# Patient Record
Sex: MALE | Race: WHITE | NOT HISPANIC OR LATINO | Employment: FULL TIME | URBAN - NONMETROPOLITAN AREA
[De-identification: names, ages, dates, MRNs, and addresses within clinical notes are randomized per-mention and may not be internally consistent; named-entity substitution may affect disease eponyms.]

---

## 2020-02-11 ENCOUNTER — OFFICE VISIT (OUTPATIENT)
Dept: INTERNAL MEDICINE | Facility: CLINIC | Age: 35
End: 2020-02-11

## 2020-02-11 VITALS
SYSTOLIC BLOOD PRESSURE: 131 MMHG | HEART RATE: 77 BPM | TEMPERATURE: 98.1 F | HEIGHT: 69 IN | DIASTOLIC BLOOD PRESSURE: 69 MMHG | BODY MASS INDEX: 30.21 KG/M2 | WEIGHT: 204 LBS | OXYGEN SATURATION: 98 %

## 2020-02-11 DIAGNOSIS — M79.671 RIGHT FOOT PAIN: ICD-10-CM

## 2020-02-11 DIAGNOSIS — F41.9 ANXIETY: ICD-10-CM

## 2020-02-11 DIAGNOSIS — R12 HEARTBURN: ICD-10-CM

## 2020-02-11 DIAGNOSIS — G43.809 OTHER MIGRAINE WITHOUT STATUS MIGRAINOSUS, NOT INTRACTABLE: ICD-10-CM

## 2020-02-11 DIAGNOSIS — F31.9 BIPOLAR 1 DISORDER (HCC): Primary | ICD-10-CM

## 2020-02-11 DIAGNOSIS — R45.4 DIFFICULTY CONTROLLING ANGER: ICD-10-CM

## 2020-02-11 PROCEDURE — 99204 OFFICE O/P NEW MOD 45 MIN: CPT | Performed by: INTERNAL MEDICINE

## 2020-02-11 RX ORDER — IBUPROFEN 800 MG/1
800 TABLET ORAL 2 TIMES DAILY
COMMUNITY
Start: 2020-01-15 | End: 2020-02-11 | Stop reason: SDUPTHER

## 2020-02-11 RX ORDER — IBUPROFEN 800 MG/1
800 TABLET ORAL 2 TIMES DAILY
Qty: 60 TABLET | Refills: 3 | Status: SHIPPED | OUTPATIENT
Start: 2020-02-11 | End: 2020-04-02 | Stop reason: SDUPTHER

## 2020-02-11 RX ORDER — HALOPERIDOL 2 MG/1
2 TABLET ORAL 2 TIMES DAILY
Qty: 60 TABLET | Refills: 2 | Status: SHIPPED | OUTPATIENT
Start: 2020-02-11 | End: 2020-04-02

## 2020-02-11 RX ORDER — ALPRAZOLAM 1 MG/1
1 TABLET ORAL 3 TIMES DAILY PRN
Qty: 90 TABLET | Refills: 0 | Status: SHIPPED | OUTPATIENT
Start: 2020-02-11 | End: 2020-03-10 | Stop reason: SDUPTHER

## 2020-02-11 RX ORDER — RANITIDINE 300 MG/1
300 TABLET ORAL 2 TIMES DAILY
COMMUNITY
End: 2020-02-11 | Stop reason: SDUPTHER

## 2020-02-11 RX ORDER — LAMOTRIGINE 200 MG/1
200 TABLET ORAL 2 TIMES DAILY
COMMUNITY
End: 2020-02-11 | Stop reason: SDUPTHER

## 2020-02-11 RX ORDER — HALOPERIDOL 2 MG/1
2 TABLET ORAL 2 TIMES DAILY
COMMUNITY
End: 2020-02-11 | Stop reason: SDUPTHER

## 2020-02-11 RX ORDER — FAMOTIDINE 40 MG/1
40 TABLET, FILM COATED ORAL 2 TIMES DAILY
Qty: 60 TABLET | Refills: 3 | Status: SHIPPED | OUTPATIENT
Start: 2020-02-11 | End: 2020-04-02 | Stop reason: SDUPTHER

## 2020-02-11 RX ORDER — PANTOPRAZOLE SODIUM 40 MG/1
40 TABLET, DELAYED RELEASE ORAL DAILY
Qty: 30 TABLET | Refills: 2 | Status: SHIPPED | OUTPATIENT
Start: 2020-02-11 | End: 2020-04-02

## 2020-02-11 RX ORDER — LAMOTRIGINE 200 MG/1
200 TABLET ORAL 2 TIMES DAILY
Qty: 60 TABLET | Refills: 2 | Status: SHIPPED | OUTPATIENT
Start: 2020-02-11 | End: 2020-04-02 | Stop reason: SDUPTHER

## 2020-02-11 RX ORDER — QUETIAPINE FUMARATE 200 MG/1
200 TABLET, FILM COATED ORAL DAILY
COMMUNITY
End: 2020-02-11 | Stop reason: SDUPTHER

## 2020-02-11 RX ORDER — SUMATRIPTAN 100 MG/1
100 TABLET, FILM COATED ORAL AS NEEDED
Qty: 30 TABLET | Refills: 2 | Status: SHIPPED | OUTPATIENT
Start: 2020-02-11

## 2020-02-11 RX ORDER — SUMATRIPTAN 100 MG/1
100 TABLET, FILM COATED ORAL AS NEEDED
COMMUNITY
End: 2020-02-11 | Stop reason: SDUPTHER

## 2020-02-11 RX ORDER — ALPRAZOLAM 1 MG/1
1 TABLET ORAL 3 TIMES DAILY
COMMUNITY
Start: 2020-01-09 | End: 2020-02-11 | Stop reason: SDUPTHER

## 2020-02-11 RX ORDER — QUETIAPINE FUMARATE 200 MG/1
200 TABLET, FILM COATED ORAL DAILY
Qty: 30 TABLET | Refills: 2 | Status: SHIPPED | OUTPATIENT
Start: 2020-02-11 | End: 2020-04-02 | Stop reason: SDUPTHER

## 2020-02-11 NOTE — PROGRESS NOTES
Subjective     Chief Complaint   Patient presents with   • Establish Care       History of Present Illness  Patient works at [employer]. States that he got fired at [employer] because he can't deal with people and bosses.  Patient is going to counseling at [facility]. States that he refuses to go to their doctors because they change out so much. Discussed past trauma and anxiety issues.   Patient states that if he doesn't take the Haldol feels like there is another part of him that wants to come out and be angry. Patient states that he does not feel too sleep.   Patient recently has had decreasing doses of Seroquel because morning fatigue. Meds were being adjusted by Modesto Mascorro MD.   Patient has been to [facility] within the last year.     Patient's PMR from outside medical facility reviewed and noted.    Review of Systems   Constitutional: Negative for chills and fever.   HENT: Positive for mouth sores (cold sore) and sore throat.    Eyes: Negative for discharge and redness.   Respiratory: Positive for shortness of breath (Happens with anger issues).    Gastrointestinal: Positive for nausea (Patient states from acid refulx). Negative for diarrhea and vomiting.   Genitourinary: Negative for dysuria and hematuria.   Musculoskeletal:        Foot pain   Skin: Negative for color change and wound.   Neurological: Negative for seizures and weakness.   Psychiatric/Behavioral: Positive for behavioral problems and sleep disturbance. Negative for self-injury and suicidal ideas. The patient is nervous/anxious.         Otherwise complete ROS reviewed and negative except as mentioned in the HPI.    Past Medical History:   Past Medical History:   Diagnosis Date   • Anger    • Anxiety    • Bipolar 1 disorder (CMS/HCC)    • Broken foot     right Heel   • Headache      Past Surgical History:History reviewed. No pertinent surgical history.     Social History:  reports that he has been smoking. He has been smoking about 1.00  "pack per day. He has quit using smokeless tobacco.  His smokeless tobacco use included snuff. Drug use questions deferred to the physician. He reports that he does not drink alcohol.    Family History: Family history is unknown by patient.       Allergies:  Allergies   Allergen Reactions   • Tetracycline Rash     Had when he was child       Medications:  Prior to Admission medications    Medication Sig Start Date End Date Taking? Authorizing Provider   ALPRAZolam (XANAX) 1 MG tablet Take 1 mg by mouth 3 (Three) Times a Day. 1/9/20  Yes Dwayne Jeff MD   haloperidol (HALDOL) 2 MG tablet Take 2 mg by mouth 2 (Two) Times a Day.   Yes Dwayne Jeff MD   ibuprofen (ADVIL,MOTRIN) 800 MG tablet Take 800 mg by mouth 2 (Two) Times a Day. 1/15/20  Yes Dwayne Jeff MD   lamoTRIgine (LaMICtal) 200 MG tablet Take 200 mg by mouth 2 (Two) Times a Day.   Yes Dwayne Jeff MD   QUEtiapine (SEROquel) 200 MG tablet Take 200 mg by mouth Daily.   Yes Dwayne Jeff MD   raNITIdine (ZANTAC) 300 MG tablet Take 300 mg by mouth 2 (Two) Times a Day.   Yes Dwayne Jeff MD   SUMAtriptan (IMITREX) 100 MG tablet Take 100 mg by mouth As Needed.   Yes Dwayne Jeff MD       Objective     Vital Signs: /69 (BP Location: Left arm, Patient Position: Sitting, Cuff Size: Adult)   Pulse 77   Temp 98.1 °F (36.7 °C) (Temporal)   Ht 175.3 cm (69\")   Wt 92.5 kg (204 lb)   SpO2 98%   BMI 30.13 kg/m²   Physical Exam   Constitutional: He appears well-developed and well-nourished.   HENT:   Head: Normocephalic and atraumatic.   Nose: Nose normal.   Mouth/Throat: Oropharynx is clear and moist.   Eyes: Conjunctivae and EOM are normal.   Neck: Normal range of motion. Neck supple.   Cardiovascular: Normal rate, regular rhythm and normal heart sounds.   Pulmonary/Chest: Effort normal and breath sounds normal.   Abdominal: Soft. Bowel sounds are normal.   Musculoskeletal: He exhibits no edema or " tenderness.   Neurological: He is alert. No cranial nerve deficit.   Skin: Skin is warm and dry.   Psychiatric: He has a normal mood and affect. Thought content normal.   Vitals reviewed.      Patient's Body mass index is 30.13 kg/m². BMI is within normal parameters. No follow-up required..      Results Reviewed:  No results found for: GLUCOSE, BUN, CREATININE, NA, K, CL, CO2, CALCIUM, ALT, AST, WBC, HCT, PLT, CHOL, TRIG, HDL, LDL, LDLHDL, HGBA1C      Assessment / Plan     Assessment/Plan:  1. Bipolar 1 disorder (CMS/HCC)  - lamoTRIgine (LaMICtal) 200 MG tablet; Take 1 tablet by mouth 2 (Two) Times a Day.  Dispense: 60 tablet; Refill: 2  - QUEtiapine (SEROquel) 200 MG tablet; Take 1 tablet by mouth Daily.  Dispense: 30 tablet; Refill: 2  - CBC w AUTO Differential; Future  - Comprehensive metabolic panel; Future    2. Anxiety  - ALPRAZolam (XANAX) 1 MG tablet; Take 1 tablet by mouth 3 (Three) Times a Day As Needed for Anxiety.  Dispense: 90 tablet; Refill: 0    3. Other migraine without status migrainosus, not intractable  - SUMAtriptan (IMITREX) 100 MG tablet; Take 1 tablet by mouth As Needed for Migraine.  Dispense: 30 tablet; Refill: 2    4. Heartburn  - famotidine (PEPCID) 40 MG tablet; Take 1 tablet by mouth 2 (Two) Times a Day.  Dispense: 60 tablet; Refill: 3  - pantoprazole (PROTONIX) 40 MG EC tablet; Take 1 tablet by mouth Daily.  Dispense: 30 tablet; Refill: 2    5. Difficulty controlling anger  - haloperidol (HALDOL) 2 MG tablet; Take 1 tablet by mouth 2 (Two) Times a Day.  Dispense: 60 tablet; Refill: 2    6. Right foot pain  - ibuprofen (ADVIL,MOTRIN) 800 MG tablet; Take 1 tablet by mouth 2 (Two) Times a Day.  Dispense: 60 tablet; Refill: 3        Return in about 4 weeks (around 3/10/2020) for Next scheduled follow up. unless patient needs to be seen sooner or acute issues arise.    Code Status: Full    I have discussed the patient results/orders and and plan/recommendation with them at today's visit.       Radha Sanford, DO   02/11/2020

## 2020-03-09 ENCOUNTER — TELEPHONE (OUTPATIENT)
Dept: INTERNAL MEDICINE | Facility: CLINIC | Age: 35
End: 2020-03-09

## 2020-03-09 NOTE — TELEPHONE ENCOUNTER
Just fernando,  Pt wants to talk to you about stopping the Haldol and Seroquil,  He feels like he doesn't need them, and [facility] just put him [facility] them.   He will discuss at apt tomorrow.

## 2020-03-10 ENCOUNTER — OFFICE VISIT (OUTPATIENT)
Dept: INTERNAL MEDICINE | Facility: CLINIC | Age: 35
End: 2020-03-10

## 2020-03-10 VITALS
BODY MASS INDEX: 29.77 KG/M2 | OXYGEN SATURATION: 98 % | DIASTOLIC BLOOD PRESSURE: 72 MMHG | HEART RATE: 82 BPM | HEIGHT: 69 IN | WEIGHT: 201 LBS | TEMPERATURE: 99.5 F | SYSTOLIC BLOOD PRESSURE: 108 MMHG

## 2020-03-10 DIAGNOSIS — F41.9 ANXIETY: ICD-10-CM

## 2020-03-10 DIAGNOSIS — F31.9 BIPOLAR 1 DISORDER (HCC): ICD-10-CM

## 2020-03-10 DIAGNOSIS — N52.9 ERECTILE DYSFUNCTION, UNSPECIFIED ERECTILE DYSFUNCTION TYPE: Primary | ICD-10-CM

## 2020-03-10 PROCEDURE — 99213 OFFICE O/P EST LOW 20 MIN: CPT | Performed by: INTERNAL MEDICINE

## 2020-03-10 RX ORDER — ALPRAZOLAM 1 MG/1
1 TABLET ORAL 3 TIMES DAILY PRN
Qty: 90 TABLET | Refills: 0 | Status: SHIPPED | OUTPATIENT
Start: 2020-03-10 | End: 2020-04-02 | Stop reason: SDUPTHER

## 2020-03-10 NOTE — PROGRESS NOTES
"        Subjective     Chief Complaint   Patient presents with   • Follow-up     4 wk, discuss medications       History of Present Illness  Patient states that when he was in [employer] he used to get into fights. That's why he was put on Haldol. States that it is over two months since he could get an erection. Is able to get an erection, but states that in the middle of sex \"it just dies out.\"  States that he has been on Haldol for about a year.    Patient states that he tried the other day but it just didn't work.      Having difficulty with his relationship.    Patient states that his mind is mush. Difficulty trying to train for a new position at work.     Patient's PMR from outside medical facility reviewed and noted.    Review of Systems   Constitutional: Negative for chills and fever.   Eyes: Negative for discharge and redness.   Gastrointestinal: Negative for diarrhea, nausea and vomiting.   Genitourinary: Negative for dysuria and urgency.        Erectile dysfunction   Musculoskeletal: Negative for arthralgias and back pain.   Skin: Negative for color change and wound.   Psychiatric/Behavioral: Positive for agitation and behavioral problems.        Otherwise complete ROS reviewed and negative except as mentioned in the HPI.    Past Medical History:   Past Medical History:   Diagnosis Date   • Anger    • Anxiety    • Bipolar 1 disorder (CMS/HCC)    • Broken foot     right Heel   • Headache      Past Surgical History:History reviewed. No pertinent surgical history.     Social History:  reports that he has been smoking electronic cigarette. He has been smoking about 1.00 pack per day. He has quit using smokeless tobacco.  His smokeless tobacco use included snuff. Drug use questions deferred to the physician. He reports that he does not drink alcohol.    Family History: Family history is unknown by patient.       Allergies:  Allergies   Allergen Reactions   • Tetracycline Rash     Had when he was child   " "    Medications:  Prior to Admission medications    Medication Sig Start Date End Date Taking? Authorizing Provider   ALPRAZolam (XANAX) 1 MG tablet Take 1 tablet by mouth 3 (Three) Times a Day As Needed for Anxiety. 2/11/20  Yes Radha Sanford DO   famotidine (PEPCID) 40 MG tablet Take 1 tablet by mouth 2 (Two) Times a Day. 2/11/20  Yes Radha Sanford DO   haloperidol (HALDOL) 2 MG tablet Take 1 tablet by mouth 2 (Two) Times a Day. 2/11/20  Yes Radha Sanford DO   ibuprofen (ADVIL,MOTRIN) 800 MG tablet Take 1 tablet by mouth 2 (Two) Times a Day. 2/11/20  Yes Radha Sanford DO   lamoTRIgine (LaMICtal) 200 MG tablet Take 1 tablet by mouth 2 (Two) Times a Day. 2/11/20  Yes Radha Sanford DO   pantoprazole (PROTONIX) 40 MG EC tablet Take 1 tablet by mouth Daily. 2/11/20  Yes Radha Sanford DO   QUEtiapine (SEROquel) 200 MG tablet Take 1 tablet by mouth Daily. 2/11/20  Yes Radha Sanford DO   SUMAtriptan (IMITREX) 100 MG tablet Take 1 tablet by mouth As Needed for Migraine. 2/11/20  Yes Radha Sanford DO       Objective     Vital Signs: /72 (BP Location: Left arm, Patient Position: Sitting, Cuff Size: Adult)   Pulse 82   Temp 99.5 °F (37.5 °C) (Temporal)   Ht 175.3 cm (69\")   Wt 91.2 kg (201 lb)   SpO2 98%   BMI 29.68 kg/m²   Physical Exam   Constitutional: He appears well-developed and well-nourished.   HENT:   Head: Normocephalic and atraumatic.   Nose: Nose normal.   Mouth/Throat: Oropharynx is clear and moist.   Eyes: Conjunctivae and EOM are normal.   Neck: Normal range of motion. Neck supple.   Cardiovascular: Normal rate, regular rhythm and normal heart sounds.   Pulmonary/Chest: Effort normal and breath sounds normal.   Abdominal: Soft. Bowel sounds are normal.   Musculoskeletal: He exhibits no edema or tenderness.   Neurological: He is alert. No cranial nerve deficit.   Skin: Skin is warm and dry.   Psychiatric: He has a normal mood and affect.   Vitals " reviewed.      Patient's Body mass index is 29.68 kg/m². BMI is within normal parameters. No follow-up required..      Results Reviewed:  No results found for: GLUCOSE, BUN, CREATININE, NA, K, CL, CO2, CALCIUM, ALT, AST, WBC, HCT, PLT, CHOL, TRIG, HDL, LDL, LDLHDL, HGBA1C      Assessment / Plan     Assessment/Plan:  1. Erectile dysfunction, unspecified erectile dysfunction type  - TSH  - Testosterone    2. Bipolar 1 disorder (CMS/HCC)  - CBC w AUTO Differential  - Comprehensive metabolic panel    Patient is going to try and wean from his Haldol. Patient is going to take one pill a week for a week and then one pill every other day for a week until the medication is DCd. Patient is to watch for mood swings and increase in anger. Patient is to call if anger issues arise.     Return in about 4 weeks (around 4/7/2020) for Next scheduled follow up. unless patient needs to be seen sooner or acute issues arise.    Code Status: Full    I have discussed the patient results/orders and and plan/recommendation with them at today's visit.      Radha Sanford, DO   03/10/2020

## 2020-03-12 LAB
ALBUMIN SERPL-MCNC: 4.9 G/DL (ref 4–5)
ALBUMIN/GLOB SERPL: 2.2 {RATIO} (ref 1.2–2.2)
ALP SERPL-CCNC: 102 IU/L (ref 39–117)
ALT SERPL-CCNC: 25 IU/L (ref 0–44)
AST SERPL-CCNC: 20 IU/L (ref 0–40)
BASOPHILS # BLD AUTO: 0.1 X10E3/UL (ref 0–0.2)
BASOPHILS NFR BLD AUTO: 1 %
BILIRUB SERPL-MCNC: 0.5 MG/DL (ref 0–1.2)
BUN SERPL-MCNC: 16 MG/DL (ref 6–20)
BUN/CREAT SERPL: 14 (ref 9–20)
CALCIUM SERPL-MCNC: 9.9 MG/DL (ref 8.7–10.2)
CHLORIDE SERPL-SCNC: 102 MMOL/L (ref 96–106)
CO2 SERPL-SCNC: 24 MMOL/L (ref 20–29)
CREAT SERPL-MCNC: 1.18 MG/DL (ref 0.76–1.27)
EOSINOPHIL # BLD AUTO: 0.2 X10E3/UL (ref 0–0.4)
EOSINOPHIL NFR BLD AUTO: 2 %
ERYTHROCYTE [DISTWIDTH] IN BLOOD BY AUTOMATED COUNT: 12.1 % (ref 11.6–15.4)
GLOBULIN SER CALC-MCNC: 2.2 G/DL (ref 1.5–4.5)
GLUCOSE SERPL-MCNC: 96 MG/DL (ref 65–99)
HCT VFR BLD AUTO: 45.2 % (ref 37.5–51)
HGB BLD-MCNC: 15 G/DL (ref 13–17.7)
IMM GRANULOCYTES # BLD AUTO: 0 X10E3/UL (ref 0–0.1)
IMM GRANULOCYTES NFR BLD AUTO: 0 %
LYMPHOCYTES # BLD AUTO: 1.8 X10E3/UL (ref 0.7–3.1)
LYMPHOCYTES NFR BLD AUTO: 22 %
MCH RBC QN AUTO: 31.6 PG (ref 26.6–33)
MCHC RBC AUTO-ENTMCNC: 33.2 G/DL (ref 31.5–35.7)
MCV RBC AUTO: 95 FL (ref 79–97)
MONOCYTES # BLD AUTO: 0.5 X10E3/UL (ref 0.1–0.9)
MONOCYTES NFR BLD AUTO: 7 %
NEUTROPHILS # BLD AUTO: 5.6 X10E3/UL (ref 1.4–7)
NEUTROPHILS NFR BLD AUTO: 68 %
PLATELET # BLD AUTO: 350 X10E3/UL (ref 150–450)
POTASSIUM SERPL-SCNC: 4.8 MMOL/L (ref 3.5–5.2)
PROT SERPL-MCNC: 7.1 G/DL (ref 6–8.5)
RBC # BLD AUTO: 4.75 X10E6/UL (ref 4.14–5.8)
SODIUM SERPL-SCNC: 142 MMOL/L (ref 134–144)
TESTOST SERPL-MCNC: 310 NG/DL (ref 264–916)
TSH SERPL DL<=0.005 MIU/L-ACNC: 1.69 UIU/ML (ref 0.45–4.5)
WBC # BLD AUTO: 8.1 X10E3/UL (ref 3.4–10.8)

## 2020-03-14 ENCOUNTER — HOSPITAL ENCOUNTER (INPATIENT)
Age: 35
LOS: 3 days | Discharge: HOME | DRG: 885 | End: 2020-03-17
Attending: EMERGENCY MEDICINE | Admitting: PSYCHIATRY & NEUROLOGY
Payer: MEDICAID

## 2020-03-14 LAB
ALBUMIN SERPL-MCNC: 4.6 G/DL (ref 3.5–5.2)
ALP BLD-CCNC: 84 U/L (ref 40–130)
ALT SERPL-CCNC: 19 U/L (ref 5–41)
AMPHETAMINE SCREEN, URINE: NEGATIVE
ANION GAP SERPL CALCULATED.3IONS-SCNC: 9 MMOL/L (ref 7–19)
AST SERPL-CCNC: 15 U/L (ref 5–40)
BARBITURATE SCREEN URINE: NEGATIVE
BASOPHILS ABSOLUTE: 0.1 K/UL (ref 0–0.2)
BASOPHILS RELATIVE PERCENT: 0.6 % (ref 0–1)
BENZODIAZEPINE SCREEN, URINE: POSITIVE
BILIRUB SERPL-MCNC: 0.3 MG/DL (ref 0.2–1.2)
BILIRUBIN URINE: NEGATIVE
BLOOD, URINE: NEGATIVE
BUN BLDV-MCNC: 14 MG/DL (ref 6–20)
CALCIUM SERPL-MCNC: 9.2 MG/DL (ref 8.6–10)
CANNABINOID SCREEN URINE: POSITIVE
CHLORIDE BLD-SCNC: 106 MMOL/L (ref 98–111)
CLARITY: CLEAR
CO2: 27 MMOL/L (ref 22–29)
COCAINE METABOLITE SCREEN URINE: NEGATIVE
COLOR: YELLOW
CREAT SERPL-MCNC: 1.1 MG/DL (ref 0.5–1.2)
EOSINOPHILS ABSOLUTE: 0.2 K/UL (ref 0–0.6)
EOSINOPHILS RELATIVE PERCENT: 1.8 % (ref 0–5)
ETHANOL: <10 MG/DL (ref 0–0.08)
GFR NON-AFRICAN AMERICAN: >60
GLUCOSE BLD-MCNC: 99 MG/DL (ref 74–109)
GLUCOSE URINE: NEGATIVE MG/DL
HCT VFR BLD CALC: 40.9 % (ref 42–52)
HEMOGLOBIN: 14 G/DL (ref 14–18)
IMMATURE GRANULOCYTES #: 0 K/UL
KETONES, URINE: NEGATIVE MG/DL
LEUKOCYTE ESTERASE, URINE: NEGATIVE
LYMPHOCYTES ABSOLUTE: 1.8 K/UL (ref 1.1–4.5)
LYMPHOCYTES RELATIVE PERCENT: 20.8 % (ref 20–40)
Lab: ABNORMAL
MCH RBC QN AUTO: 31.5 PG (ref 27–31)
MCHC RBC AUTO-ENTMCNC: 34.2 G/DL (ref 33–37)
MCV RBC AUTO: 91.9 FL (ref 80–94)
MONOCYTES ABSOLUTE: 0.5 K/UL (ref 0–0.9)
MONOCYTES RELATIVE PERCENT: 5.6 % (ref 0–10)
NEUTROPHILS ABSOLUTE: 6 K/UL (ref 1.5–7.5)
NEUTROPHILS RELATIVE PERCENT: 70.8 % (ref 50–65)
NITRITE, URINE: NEGATIVE
OPIATE SCREEN URINE: POSITIVE
PDW BLD-RTO: 11.8 % (ref 11.5–14.5)
PH UA: 6 (ref 5–8)
PLATELET # BLD: 299 K/UL (ref 130–400)
PMV BLD AUTO: 8.9 FL (ref 9.4–12.4)
POTASSIUM REFLEX MAGNESIUM: 4.3 MMOL/L (ref 3.5–5)
PROTEIN UA: NEGATIVE MG/DL
RBC # BLD: 4.45 M/UL (ref 4.7–6.1)
SODIUM BLD-SCNC: 142 MMOL/L (ref 136–145)
SPECIFIC GRAVITY UA: 1.03 (ref 1–1.03)
TOTAL PROTEIN: 6.6 G/DL (ref 6.6–8.7)
URINE REFLEX TO CULTURE: NORMAL
UROBILINOGEN, URINE: 0.2 E.U./DL
WBC # BLD: 8.5 K/UL (ref 4.8–10.8)

## 2020-03-14 PROCEDURE — 80307 DRUG TEST PRSMV CHEM ANLYZR: CPT

## 2020-03-14 PROCEDURE — 80061 LIPID PANEL: CPT

## 2020-03-14 PROCEDURE — 1240000000 HC EMOTIONAL WELLNESS R&B

## 2020-03-14 PROCEDURE — 85025 COMPLETE CBC W/AUTO DIFF WBC: CPT

## 2020-03-14 PROCEDURE — 36415 COLL VENOUS BLD VENIPUNCTURE: CPT

## 2020-03-14 PROCEDURE — G0480 DRUG TEST DEF 1-7 CLASSES: HCPCS

## 2020-03-14 PROCEDURE — 81003 URINALYSIS AUTO W/O SCOPE: CPT

## 2020-03-14 PROCEDURE — 83036 HEMOGLOBIN GLYCOSYLATED A1C: CPT

## 2020-03-14 PROCEDURE — 84443 ASSAY THYROID STIM HORMONE: CPT

## 2020-03-14 PROCEDURE — 80053 COMPREHEN METABOLIC PANEL: CPT

## 2020-03-14 PROCEDURE — 82306 VITAMIN D 25 HYDROXY: CPT

## 2020-03-14 PROCEDURE — 99285 EMERGENCY DEPT VISIT HI MDM: CPT

## 2020-03-14 PROCEDURE — 6370000000 HC RX 637 (ALT 250 FOR IP): Performed by: PSYCHIATRY & NEUROLOGY

## 2020-03-14 PROCEDURE — 82607 VITAMIN B-12: CPT

## 2020-03-14 RX ORDER — TRAZODONE HYDROCHLORIDE 50 MG/1
50 TABLET ORAL NIGHTLY PRN
Status: DISCONTINUED | OUTPATIENT
Start: 2020-03-14 | End: 2020-03-16

## 2020-03-14 RX ORDER — PANTOPRAZOLE SODIUM 40 MG/1
40 TABLET, DELAYED RELEASE ORAL DAILY
COMMUNITY
End: 2020-06-23

## 2020-03-14 RX ORDER — ESOMEPRAZOLE MAGNESIUM 40 MG/1
40 FOR SUSPENSION ORAL DAILY
COMMUNITY
End: 2020-06-23 | Stop reason: SDUPTHER

## 2020-03-14 RX ORDER — POLYETHYLENE GLYCOL 3350 17 G/17G
17 POWDER, FOR SOLUTION ORAL DAILY PRN
Status: DISCONTINUED | OUTPATIENT
Start: 2020-03-14 | End: 2020-03-17 | Stop reason: HOSPADM

## 2020-03-14 RX ORDER — HALOPERIDOL 10 MG/1
20 TABLET ORAL 2 TIMES DAILY
Status: ON HOLD | COMMUNITY
End: 2020-03-17 | Stop reason: HOSPADM

## 2020-03-14 RX ORDER — SUMATRIPTAN 100 MG/1
100 TABLET, FILM COATED ORAL 2 TIMES DAILY PRN
COMMUNITY
End: 2020-06-23 | Stop reason: SDUPTHER

## 2020-03-14 RX ORDER — NICOTINE 21 MG/24HR
1 PATCH, TRANSDERMAL 24 HOURS TRANSDERMAL DAILY
Status: DISCONTINUED | OUTPATIENT
Start: 2020-03-14 | End: 2020-03-17 | Stop reason: HOSPADM

## 2020-03-14 RX ORDER — ALPRAZOLAM 1 MG/1
1 TABLET ORAL 3 TIMES DAILY PRN
COMMUNITY
End: 2020-06-23

## 2020-03-14 RX ORDER — ACETAMINOPHEN 325 MG/1
650 TABLET ORAL EVERY 4 HOURS PRN
Status: DISCONTINUED | OUTPATIENT
Start: 2020-03-14 | End: 2020-03-17 | Stop reason: HOSPADM

## 2020-03-14 RX ORDER — QUETIAPINE FUMARATE 200 MG/1
200 TABLET, FILM COATED ORAL ONCE
Status: COMPLETED | OUTPATIENT
Start: 2020-03-14 | End: 2020-03-14

## 2020-03-14 RX ORDER — QUETIAPINE FUMARATE 200 MG/1
200 TABLET, FILM COATED ORAL DAILY
COMMUNITY
End: 2020-06-23 | Stop reason: SDUPTHER

## 2020-03-14 RX ORDER — LAMOTRIGINE 100 MG/1
200 TABLET ORAL 2 TIMES DAILY
COMMUNITY
End: 2020-06-23 | Stop reason: SDUPTHER

## 2020-03-14 RX ADMIN — TRAZODONE HYDROCHLORIDE 50 MG: 50 TABLET, FILM COATED ORAL at 21:51

## 2020-03-14 RX ADMIN — QUETIAPINE FUMARATE 200 MG: 200 TABLET ORAL at 21:51

## 2020-03-14 ASSESSMENT — SLEEP AND FATIGUE QUESTIONNAIRES
SLEEP PATTERN: DIFFICULTY FALLING ASLEEP;RESTLESSNESS;INSOMNIA
DO YOU USE A SLEEP AID: YES
DIFFICULTY STAYING ASLEEP: YES
DO YOU HAVE DIFFICULTY SLEEPING: YES
RESTFUL SLEEP: NO
DIFFICULTY FALLING ASLEEP: YES
DIFFICULTY ARISING: YES

## 2020-03-14 ASSESSMENT — ENCOUNTER SYMPTOMS
VOICE CHANGE: 0
CHOKING: 0
BLOOD IN STOOL: 0
SINUS PRESSURE: 0
FACIAL SWELLING: 0
NAUSEA: 0
EYE DISCHARGE: 0
APNEA: 0
DIARRHEA: 0
CONSTIPATION: 0
SORE THROAT: 0

## 2020-03-14 ASSESSMENT — LIFESTYLE VARIABLES: HISTORY_ALCOHOL_USE: NO

## 2020-03-14 ASSESSMENT — PATIENT HEALTH QUESTIONNAIRE - PHQ9: SUM OF ALL RESPONSES TO PHQ QUESTIONS 1-9: 24

## 2020-03-14 NOTE — PROGRESS NOTES
ED Initial Intake Assessment     3/14/20    Orlando Finn ,a 29 y.o. male, presents to the ED for a psychiatric assessment. ED Arrival time: 1512  ED physician: Lucian Newberry [facility] Notification time: In ER  [facility] Assessment start time: 18  Psychiatrist call time:   Spoke with Dr. Darlene Eric    Patient is referred by: Girlfriend and her mom    Reason for visit to ED - Presenting problem:     PT states reason for ED visit, \"I'm back at where I was when I wanted to OD on my pills. There's a lot of stress at home, my uncle  when I was a kid but I still think of it, can't see my son, all kinds of shit. My son, Aline Lefort  at birth 2 yrs ago. I overdosed about 6 mths ago and I was sent to [facility] for 4 days and then discharged. No counseling nothing\". ER Physician Reports: Orlando Finn is a 29 y.o. male who presents to the emergency department no health evaluation. 28-year-old male here for mental health evaluation. He tells me he does not know what is going on he does not understand what is happening. But is making him depressed and he is having suicidal thoughts. He wanted to come in for evaluation voluntarily before he got to the point where he would overdose again. Apparently about 6 months ago [facility] he overdosed on Seroquel he does not know how long he was in the hospital and then he was sent to [facility] state for 24 hours he thinks. He states he is taking medication and is coming off of Haldol. He could not tell me he was starting new medicine or that they were just weaning him down. His symptoms seem worse to him he has not acted out on them he denies drugs and alcohol usage. He has family. He states they are staying home because of the coronavirus. He himself has not had any viral-like symptoms and no risk at travel or exposure.   The history is provided by the patient.      Duration of symptoms: 2 weeks    Current Stressors: family, financial and legal  Current living arrangement: Lives with girlfriend and her mom    C-SSRS Completed: yes    SI:  admits to   Plan: yes - to take pills  Past SI attempts: yes   If yes, when and how many times: 3 when he was 12, 18 and 6 mths ago  Currently able to contract for safety outside hospital: no   Describe:   HI: denies  If yes describe:   Delusions: denies  If yes describe:   Hallucinations: denies   If yes describe:   Risk of Harm to self: yes   If yes explain: suicidal  Was it within the past 6 months: yes   Risk of Harm to others: no   If yes explain:   Was it within the past 6 months: no   Anxiety 1-10:  7  Explain if increased:   Depression 1-10:  7  Explain if increased:   Level of function outside hospital decreased:  yes   If yes explain:     Psychiatric Hospitalizations: Yes   Where & When: [address] [place] [address]  Outpatient Psychiatric Treatment: Denies    Family History:    Family history of mental illness:  yes   Mom's side-Pt says all are \"mentally retarded\"  Family members with suicide attempt: no   If yes explain:     Substance Abuse History:     SBIRT Completed: yes    Current ETOH LEVELS: <10    ETOH Usage:     Amount drinking daily: denied    Date of last drink:     Substance/Chemical Abuse/Recreational Drug History:  Substance used: Denies  Date of last substance use:      Opiates: It was discussed with pt they would not be receiving opiates unless they were within 3 days post surgery/acute injury. Patient voiced understanding and agreed. Psychiatric Review Of Systems:     Recent Sleep changes: yes   Recent appetite changes: yes   Recent weight changes/Pounds gained (+) or lost (-): yes      Medical History:     Medical Diagnosis/Issues: Denies  CT today in ED:no  Use of 02 or CPAP: no  Ambulatory: yes  Independent or Need assistance with Self Care:     PCP: No primary care provider on file. Current Medications:   Scheduled Meds: No current facility-administered medications for this encounter.    No current outpatient medications on file. Mental Status Evaluation:     Appearance:  age appropriate   Behavior:  Restless & fidgety   Speech:  normal pitch and normal volume   Mood:  anxious and irritable   Affect:  flat and redirectable   Thought Process:  circumstantial   Thought Content:  suicidal   Sensorium:  person, place, situation, day of week, month of year and year   Cognition:  grossly intact   Insight:  poor       Collateral Information:     Name: Catherine Soto  Relationship: Girlfriend  Phone Number: [phone]  Collateral:     Disposition:     Choose one of the four options below for   disposition:     1. Decision to admit to [facility]: yes    If yes, which unit Adult or Geriatric Unit:  Adult  Is patient voluntary: yes  If no has a 72 hold been initiated:   Does the patient have a guardian:   Has the guardian been notified:   Admission Diagnosis: Suicidal    2. Referral to IOP/PHP:      3. Decision to Discharge:   Does not meet criteria for acceptance to   unit due to:     4. Transferred:       Patient was transferred due to:      Other follow up information provided:      Nunu Fierro RN [phone]

## 2020-03-15 PROBLEM — F34.9 PERSISTENT MOOD (AFFECTIVE) DISORDER, UNSPECIFIED (HCC): Status: ACTIVE | Noted: 2020-03-15

## 2020-03-15 LAB
CHOLESTEROL, TOTAL: 195 MG/DL (ref 160–199)
HBA1C MFR BLD: 5 % (ref 4–6)
HDLC SERPL-MCNC: 45 MG/DL (ref 55–121)
LDL CHOLESTEROL CALCULATED: 136 MG/DL
TRIGL SERPL-MCNC: 68 MG/DL (ref 0–149)
TSH REFLEX FT4: 1.46 UIU/ML (ref 0.35–5.5)
VITAMIN B-12: 483 PG/ML (ref 211–946)
VITAMIN D 25-HYDROXY: 16.4 NG/ML

## 2020-03-15 PROCEDURE — 6370000000 HC RX 637 (ALT 250 FOR IP): Performed by: PSYCHIATRY & NEUROLOGY

## 2020-03-15 PROCEDURE — 99223 1ST HOSP IP/OBS HIGH 75: CPT | Performed by: PSYCHIATRY & NEUROLOGY

## 2020-03-15 PROCEDURE — 1240000000 HC EMOTIONAL WELLNESS R&B

## 2020-03-15 RX ORDER — LAMOTRIGINE 100 MG/1
200 TABLET ORAL 2 TIMES DAILY
Status: DISCONTINUED | OUTPATIENT
Start: 2020-03-15 | End: 2020-03-17 | Stop reason: HOSPADM

## 2020-03-15 RX ORDER — CLONAZEPAM 0.5 MG/1
0.5 TABLET ORAL 2 TIMES DAILY
Status: DISCONTINUED | OUTPATIENT
Start: 2020-03-15 | End: 2020-03-17 | Stop reason: HOSPADM

## 2020-03-15 RX ORDER — HYDROXYZINE HYDROCHLORIDE 25 MG/1
25 TABLET, FILM COATED ORAL 3 TIMES DAILY PRN
Status: DISCONTINUED | OUTPATIENT
Start: 2020-03-15 | End: 2020-03-17 | Stop reason: HOSPADM

## 2020-03-15 RX ORDER — RISPERIDONE 1 MG/1
2 TABLET, ORALLY DISINTEGRATING ORAL EVERY 4 HOURS PRN
Status: DISCONTINUED | OUTPATIENT
Start: 2020-03-15 | End: 2020-03-17 | Stop reason: HOSPADM

## 2020-03-15 RX ORDER — ESOMEPRAZOLE MAGNESIUM 40 MG/1
40 FOR SUSPENSION ORAL DAILY
Status: DISCONTINUED | OUTPATIENT
Start: 2020-03-15 | End: 2020-03-15

## 2020-03-15 RX ORDER — PANTOPRAZOLE SODIUM 40 MG/1
40 TABLET, DELAYED RELEASE ORAL DAILY
Status: DISCONTINUED | OUTPATIENT
Start: 2020-03-15 | End: 2020-03-17 | Stop reason: HOSPADM

## 2020-03-15 RX ORDER — QUETIAPINE FUMARATE 200 MG/1
200 TABLET, FILM COATED ORAL NIGHTLY
Status: DISCONTINUED | OUTPATIENT
Start: 2020-03-15 | End: 2020-03-17 | Stop reason: HOSPADM

## 2020-03-15 RX ADMIN — LAMOTRIGINE 200 MG: 100 TABLET ORAL at 21:18

## 2020-03-15 RX ADMIN — CLONAZEPAM 0.5 MG: 0.5 TABLET ORAL at 21:18

## 2020-03-15 RX ADMIN — CLONAZEPAM 0.5 MG: 0.5 TABLET ORAL at 11:33

## 2020-03-15 RX ADMIN — QUETIAPINE FUMARATE 200 MG: 200 TABLET ORAL at 21:18

## 2020-03-15 RX ADMIN — PANTOPRAZOLE SODIUM 40 MG: 40 TABLET, DELAYED RELEASE ORAL at 11:37

## 2020-03-15 RX ADMIN — LAMOTRIGINE 200 MG: 100 TABLET ORAL at 11:33

## 2020-03-15 ASSESSMENT — ENCOUNTER SYMPTOMS
RESPIRATORY NEGATIVE: 1
GASTROINTESTINAL NEGATIVE: 1
EYES NEGATIVE: 1

## 2020-03-15 NOTE — PROGRESS NOTES
Treatment Team Note:     SAHARA met with [address] [address] team to discuss Pts [address] plans. Progress/Behavior/Group Attendance: TBD     Target Symptoms/Reason for admission: Pt is a 29 yr old male who presented to the emergency department with depression and suicidal thoughts. Pt reported that he is unsure what is making him depressed and unsure of when the episode started exactly. Pt stated that about 6 months ago, he overdosed on Seroquel and was eventually sent to [facility], so he was concerned that his depression might become severe again and that he would have another overdose attempt. Pt reported that there is a lot of stress at home recently. Pt denies HI, hallucinations, and delusions. Diagnoses: Bipolar D/O; Depression with suicidal ideation; Hx of Drug overdose  UDS: Positive for Benzodiazepines, Cannabis, & Opiates  BAL: Negative <10     AftercarePlan: [facility] lives with: his girlfriend Vinayak Scott and her mother. Collateral obtained from: SAHARA will meet with pt to gather release of information.   On:     Family Session: JONATHAN     Misc:

## 2020-03-15 NOTE — PROGRESS NOTES
Group Therapy Note    Date: 3/10/2020  Start Time: 1600  End Time:  1700  Number of Participants: 12  Type of Group: Healthy Living/Wellness           Patient's Goal:  medication education       Status After Intervention:  Improved    Participation Level:  Active Listener and Interactive    Participation Quality: Appropriate, Attentive, Sharing, and Supportive      Speech:  normal      Thought Process/Content: Logical  Linear      Affective Functioning: Congruent      Mood: anxious and depressed      Level of consciousness:  Alert, Oriented x4, and Attentive      Response to Learning: Able to verbalize current knowledge/experience      Endings: None Reported

## 2020-03-15 NOTE — PROGRESS NOTES
Signed          CSW completed psychosocial and CSSR-S on this date. Pt long and short term goals discussed. Pt voiced understanding. Treatment plan sheet signed. Pt verbalized understanding of the treatment plan. Pt participated in goals and objectives of the treatment plan. Completed safety plan with pt and pt received copy of safety plan. Safety plan placed in chart. It was identified that pt will require outpatient follow up appointments at local community behavioral health facility such as; [facility] [address] . Pt validated need for appointments. Pt was also provided a handout of contact information for drug and alcohol treatment centers and other community support service such as [facility] and [facility]. In the last 6 months has the pt been danger to self: YES  In the last 6 months has the pt been danger to others:  NO     Provided pt with [employer] handout entitled \"Quitting Smoking,\" reviewed handout with pt addressing dangers of smoking, developing coping skills, and providing basic information about quitting. Patient declined practical counseling of tobacco practical counseling during the hospital stay.

## 2020-03-15 NOTE — H&P
Department of Psychiatry  Attending History and Physical        CHIEF COMPLAINT: \"I cannot do this anymore. I think of overdosing\"    History obtained from: patient, chart    HISTORY OF PRESENT ILLNESS:    22-year-old white male with history of depression and previous suicide attempt by overdose, admitted for suicidal ideation with a plan to overdose. Positive for benzodiazepine, cannabis and opiate. Patient is on Xanax prescribed by [address] [place] [address]. He is also on lamotrigine, Haldol and Seroquel. Patient presents with dysphoric affect this morning. He is somewhat irritable, however, cooperative. Endorses suicidal ideation with a plan to overdose. States he is in crisis in the setting of his social stressors. Patient was recently incarcerated for 8 years. He is struggling with mental illness. He goes to [address] [place] [address] and is upset with his therapists changing all the time. His girlfriend currently goes to the methadone clinic. \"I have to deal with her issues on top of mine. \"  Patient has a 15year-old son whom he has not seen in several years. He works 2 jobs in order to pay child support, and still is unable to visit him which adds to the stress. His other son  at birth 2 years ago. Patient denies depression. He reports mood swings. He denies decreased need for sleep. Feeling angry in the setting of his problems. Denies auditory and visual hallucinations. Denies paranoia. He was on Depakote in the past which caused side effects. States he likes lamotrigine and Seroquel. His provider has been weaning him off Haldol. PSYCHIATRIC HISTORY:    Diagnoses: Bipolar disorder  Suicide attempts/gestures: Overdosed on Seroquel 6 months ago.   Prior hospitalizations: [facility]  Medication trials: Antidepressants, Depakote, lamotrigine, Seroquel, Xanax, Haldol, Thorazine  Mental health contact: [address] [id number] behavioral health  Head trauma: Denies     SUBSTANCE USE

## 2020-03-15 NOTE — H&P
HISTORY AND PHYSICAL    Theodore Houser is a 29 y.o.  male admitted through ED for depression with suicidal thoughts. State he us unsure what is making him feel depressed. Unsure of when this episode started. He did overdose on Seroquel about 6 months ago and was taken to [facility] then transferred to [facility]. He was concerned that his depression would become severe again and he would attempt OD again. He reported that he is coming off of Haldol but unsure if he is being weaned or the patient has just stopped taking it. He denies use of alcohol or drugs but UDS is positive for BZO, THC, and opiates. He denies any A/V hallucinations. Patient is not very talkative and his affect is flat. This is all the information the patient provides. HISTORY:    There is no problem list on file for this patient. Past Medical History:   Diagnosis Date    Depression        History reviewed. No pertinent family history.     Social History     Socioeconomic History    Marital status: Single     Spouse name: Not on file    Number of children: Not on file    Years of education: Not on file    Highest education level: Not on file   Occupational History    Not on file   Social Needs    Financial resource strain: Not on file    Food insecurity     Worry: Not on file     Inability: Not on file    Transportation needs     Medical: Not on file     Non-medical: Not on file   Tobacco Use    Smoking status: Former Smoker    Smokeless tobacco: Never Used   Substance and Sexual Activity    Alcohol use: Not Currently    Drug use: Not Currently    Sexual activity: Not on file   Lifestyle    Physical activity     Days per week: Not on file     Minutes per session: Not on file    Stress: Not on file   Relationships    Social connections     Talks on phone: Not on file     Gets together: Not on file     Attends [employer] service: Not on file     Active member of club or organization: Not on file Attends meetings of clubs or organizations: Not on file     Relationship status: Not on file    Intimate partner violence     Fear of current or ex partner: Not on file     Emotionally abused: Not on file     Physically abused: Not on file     Forced sexual activity: Not on file   Other Topics Concern    Not on file   Social History Narrative    Not on file       Prior to Admission medications    Medication Sig Start Date End Date Taking? Authorizing Provider   ALPRAZolam Deborah Jadon) 1 MG tablet Take 1 mg by mouth 3 times daily as needed for Sleep. Yes Historical Provider, MD   pantoprazole (PROTONIX) 40 MG tablet Take 40 mg by mouth daily   Yes Historical Provider, MD   QUEtiapine (SEROQUEL) 200 MG tablet Take 200 mg by mouth daily   Yes Historical Provider, MD   haloperidol (HALDOL) 10 MG tablet Take 20 mg by mouth 2 times daily   Yes Historical Provider, MD   lamoTRIgine (LAMICTAL) 100 MG tablet Take 200 mg by mouth 2 times daily   Yes Historical Provider, MD   esomeprazole Magnesium (NEXIUM) 40 MG PACK Take 40 mg by mouth daily    Historical Provider, MD   SUMAtriptan (IMITREX) 100 MG tablet Take 100 mg by mouth 2 times daily as needed for Migraine    Historical Provider, MD         Current Facility-Administered Medications:     acetaminophen (TYLENOL) tablet 650 mg, 650 mg, Oral, Q4H PRN, Aubrie Hudson MD    polyethylene glycol (GLYCOLAX) packet 17 g, 17 g, Oral, Daily PRN, Aubrie Hudson MD    nicotine (NICODERM CQ) 21 MG/24HR 1 patch, 1 patch, Transdermal, Daily, Aubrie Hudson MD, 1 patch at 03/14/20 1922    traZODone (DESYREL) tablet 50 mg, 50 mg, Oral, Nightly PRN, Aubrie Hudson MD, 50 mg at 03/14/20 2151    Tetracyclines & related    REVIEW OF SYSTEMS:    Review of Systems   Constitutional: Negative. HENT: Negative. Eyes: Negative. Respiratory: Negative. Cardiovascular: Negative. Gastrointestinal: Negative. Genitourinary: Negative. Musculoskeletal: Negative.     Skin: Negative. Neurological: Negative. Psychiatric/Behavioral: Positive for agitation and suicidal ideas. Depressed       PHYSICAL EXAM:    /71   Pulse 68   Temp 96.9 °F (36.1 °C) (Temporal)   Resp 20   SpO2 99%     Physical Exam  Vitals signs reviewed. Constitutional:       Appearance: Normal appearance. HENT:      Head: Normocephalic. Nose: Nose normal.      Mouth/Throat:      Mouth: Mucous membranes are moist.      Pharynx: Oropharynx is clear. Eyes:      Extraocular Movements: Extraocular movements intact. Pupils: Pupils are equal, round, and reactive to light. Neck:      Musculoskeletal: Normal range of motion and neck supple. Cardiovascular:      Rate and Rhythm: Normal rate and regular rhythm. Pulses: Normal pulses. Heart sounds: Normal heart sounds. Pulmonary:      Effort: Pulmonary effort is normal. No respiratory distress. Breath sounds: Normal breath sounds. Abdominal:      General: Abdomen is flat. Bowel sounds are normal.      Palpations: Abdomen is soft. Musculoskeletal: Normal range of motion. Skin:     General: Skin is warm and dry. Capillary Refill: Capillary refill takes less than 2 seconds. Neurological:      General: No focal deficit present. Mental Status: He is alert and oriented to person, place, and time. Cranial Nerves: No cranial nerve deficit. Psychiatric:         Mood and Affect: Mood normal.         Behavior: Behavior normal.         Thought Content:  Thought content normal.         Judgment: Judgment normal.         Recent Results (from the past 24 hour(s))   Urinalysis Reflex to Culture    Collection Time: 03/14/20  3:37 PM   Result Value Ref Range    Color, UA YELLOW Straw/Yellow    Clarity, UA Clear Clear    Glucose, Ur Negative Negative mg/dL    Bilirubin Urine Negative Negative    Ketones, Urine Negative Negative mg/dL    Specific Gravity, UA 1.030 1.005 - 1.030    Blood, Urine Negative Negative    pH, UA 6.0 5.0 - 8.0    Protein, UA Negative Negative mg/dL    Urobilinogen, Urine 0.2 <2.0 E.U./dL    Nitrite, Urine Negative Negative    Leukocyte Esterase, Urine Negative Negative    Urine Reflex to Culture Not Indicated    Urine Drug Screen    Collection Time: 03/14/20  3:37 PM   Result Value Ref Range    Amphetamine Screen, Urine Negative Negative <1000 ng/mL    Barbiturate Screen, Ur Negative Negative < 200 ng/mL    Benzodiazepine Screen, Urine Positive (A) Negative <100 ng/mL    Cannabinoid Scrn, Ur Positive (A) Negative <50 ng/mL    Cocaine Metabolite Screen, Urine Negative Negative <300 ng/mL    Opiate Scrn, Ur Positive (A) Negative < 300 ng/mL    Drug Screen Comment: see below    CBC Auto Differential    Collection Time: 03/14/20  3:45 PM   Result Value Ref Range    WBC 8.5 4.8 - 10.8 K/uL    RBC 4.45 (L) 4.70 - 6.10 M/uL    Hemoglobin 14.0 14.0 - 18.0 g/dL    Hematocrit 40.9 (L) 42.0 - 52.0 %    MCV 91.9 80.0 - 94.0 fL    MCH 31.5 (H) 27.0 - 31.0 pg    MCHC 34.2 33.0 - 37.0 g/dL    RDW 11.8 11.5 - 14.5 %    Platelets 764 353 - 229 K/uL    MPV 8.9 (L) 9.4 - 12.4 fL    Neutrophils % 70.8 (H) 50.0 - 65.0 %    Lymphocytes % 20.8 20.0 - 40.0 %    Monocytes % 5.6 0.0 - 10.0 %    Eosinophils % 1.8 0.0 - 5.0 %    Basophils % 0.6 0.0 - 1.0 %    Neutrophils Absolute 6.0 1.5 - 7.5 K/uL    Immature Granulocytes # 0.0 K/uL    Lymphocytes Absolute 1.8 1.1 - 4.5 K/uL    Monocytes Absolute 0.50 0.00 - 0.90 K/uL    Eosinophils Absolute 0.20 0.00 - 0.60 K/uL    Basophils Absolute 0.10 0.00 - 0.20 K/uL   Comprehensive Metabolic Panel w/ Reflex to MG    Collection Time: 03/14/20  3:45 PM   Result Value Ref Range    Sodium 142 136 - 145 mmol/L    Potassium reflex Magnesium 4.3 3.5 - 5.0 mmol/L    Chloride 106 98 - 111 mmol/L    CO2 27 22 - 29 mmol/L    Anion Gap 9 7 - 19 mmol/L    Glucose 99 74 - 109 mg/dL    BUN 14 6 - 20 mg/dL    CREATININE 1.1 0.5 - 1.2 mg/dL    GFR Non-African American >60 >60    Calcium 9.2 8.6 - 10.0 mg/dL

## 2020-03-16 PROCEDURE — 6370000000 HC RX 637 (ALT 250 FOR IP): Performed by: PSYCHIATRY & NEUROLOGY

## 2020-03-16 PROCEDURE — 99233 SBSQ HOSP IP/OBS HIGH 50: CPT | Performed by: PSYCHIATRY & NEUROLOGY

## 2020-03-16 PROCEDURE — 1240000000 HC EMOTIONAL WELLNESS R&B

## 2020-03-16 PROCEDURE — 6370000000 HC RX 637 (ALT 250 FOR IP): Performed by: FAMILY MEDICINE

## 2020-03-16 RX ORDER — SUMATRIPTAN 25 MG/1
50 TABLET, FILM COATED ORAL DAILY PRN
Status: DISCONTINUED | OUTPATIENT
Start: 2020-03-16 | End: 2020-03-17 | Stop reason: HOSPADM

## 2020-03-16 RX ORDER — TRAZODONE HYDROCHLORIDE 100 MG/1
100 TABLET ORAL NIGHTLY
Status: DISCONTINUED | OUTPATIENT
Start: 2020-03-16 | End: 2020-03-17 | Stop reason: HOSPADM

## 2020-03-16 RX ORDER — ERGOCALCIFEROL 1.25 MG/1
50000 CAPSULE ORAL WEEKLY
Status: DISCONTINUED | OUTPATIENT
Start: 2020-03-16 | End: 2020-03-17 | Stop reason: HOSPADM

## 2020-03-16 RX ORDER — LANOLIN ALCOHOL/MO/W.PET/CERES
3 CREAM (GRAM) TOPICAL NIGHTLY
Status: DISCONTINUED | OUTPATIENT
Start: 2020-03-16 | End: 2020-03-17 | Stop reason: HOSPADM

## 2020-03-16 RX ORDER — ERGOCALCIFEROL 1.25 MG/1
50000 CAPSULE ORAL WEEKLY
Qty: 11 CAPSULE | Refills: 0 | Status: SHIPPED | OUTPATIENT
Start: 2020-03-16 | End: 2020-06-23

## 2020-03-16 RX ADMIN — CLONAZEPAM 0.5 MG: 0.5 TABLET ORAL at 22:12

## 2020-03-16 RX ADMIN — LAMOTRIGINE 200 MG: 100 TABLET ORAL at 22:12

## 2020-03-16 RX ADMIN — QUETIAPINE FUMARATE 200 MG: 200 TABLET ORAL at 22:12

## 2020-03-16 RX ADMIN — SUMATRIPTAN SUCCINATE 50 MG: 25 TABLET ORAL at 15:28

## 2020-03-16 RX ADMIN — LAMOTRIGINE 200 MG: 100 TABLET ORAL at 08:54

## 2020-03-16 RX ADMIN — HYDROXYZINE HYDROCHLORIDE 25 MG: 25 TABLET, FILM COATED ORAL at 15:31

## 2020-03-16 RX ADMIN — PANTOPRAZOLE SODIUM 40 MG: 40 TABLET, DELAYED RELEASE ORAL at 08:55

## 2020-03-16 RX ADMIN — ERGOCALCIFEROL 50000 UNITS: 1.25 CAPSULE ORAL at 16:40

## 2020-03-16 RX ADMIN — MELATONIN 3 MG: at 22:12

## 2020-03-16 RX ADMIN — TRAZODONE HYDROCHLORIDE 100 MG: 100 TABLET ORAL at 22:16

## 2020-03-16 RX ADMIN — CLONAZEPAM 0.5 MG: 0.5 TABLET ORAL at 08:54

## 2020-03-16 ASSESSMENT — PAIN SCALES - GENERAL: PAINLEVEL_OUTOF10: 9

## 2020-03-16 NOTE — PROGRESS NOTES
[facility] Adult Unit Daily Assessment  Nursing Progress Note    Room: [facility]01   Name: Cedric Philip   Age: 29 y.o.    Gender: male   Dx: <principal problem not specified>  Precautions: suicide risk  Inpatient Status: voluntary       SLEEP:    Sleep Quality Poor  Sleep Medications: Yes   PRN Sleep Meds: Yes       MEDICAL:    Other PRN Meds: Yes   Med Compliant: Yes  Accu-Chek: No  Oxygen/CPAP/BiPAP: No  CIWA/CINA: No   PAIN Assessment: headaches  Side Effects from medication: No      PSYCH:    Depression: 0   Anxiety: 8   SI denies suicidal ideation   HI Negative for homicidal ideation      AVH:Absent      GENERAL:    Appetite: good    Social: Yes   Speech: normal   Appearance: appropriately dressed and healthy looking    GROUP:    Group Participation: Yes  Participation Quality: Active Listener and Interactive    Notes:         Electronically signed by Flor Winslow RN on 3/16/20 at 5:10 PM CDT

## 2020-03-16 NOTE — PLAN OF CARE
Problem: Health Maintenance - Impaired:  Goal: Ability to perform activities of daily living will improve  Outcome: Ongoing       Group Therapy Note     Date: 3/16/2020  Start Time: 1000  End Time:  6931  Number of Participants: 8     Type of Group: Psychoeducation     Wellness Binder Information  Module Name:  emotional wellness  Session Number:  5     Patient's Goal:  obstacles to emotional wellness     Notes:  pt acknowledged negative thinking as an obstacle to emotional wellness.      Status After Intervention:  Improved     Participation Level: Interactive     Participation Quality: Appropriate, Attentive, and Sharing        Speech:  normal        Thought Process/Content: Logical        Affective Functioning: Congruent        Mood: congruent        Level of consciousness:  Alert, Oriented x4, and Attentive        Response to Learning: Able to verbalize current knowledge/experience        Endings: None Reported     Modes of Intervention: Education        Discipline Responsible: Psychoeducational Specialist        Signature:  Low Romero

## 2020-03-16 NOTE — PROGRESS NOTES
Group Therapy Note    Start Time: 800End Time:  107  Number of Participants: 10    Type of Group: Community Meeting       Patient's Goal:  \"groups and preparing myself for getting out of here\"      Notes:      Participation Level:  Active Listener       Participation Quality: Appropriate      Thought Process/Content: Logical      Affective Functioning: Congruent      Mood: calm      Level of consciousness:  Alert      Modes of Intervention: Support      Discipline Responsible: Behavioral Health Tech II      Signature:  Rebecca Helton

## 2020-03-16 NOTE — PROGRESS NOTES
Group Therapy Note    Date: 3/15/20  Start Time: 2030  End Time:  2130  Number of Participants: 9    Type of Group: Wrap-Up    Wellness Binder Information  Module Name:    Session Number:      Patient's Goal:  See self inventory    Notes:  Pt filled out wrap up sheet    Status After Intervention:  Improved    Participation Level: Minimal    Participation Quality: Appropriate      Speech:  normal      Thought Process/Content: Flight of ideas      Affective Functioning: Blunted      Mood: anxious      Level of consciousness:  Attentive      Response to Learning: Able to retain information and Progressing to goal      Endings: None Reported    Modes of Intervention: Education and Support      Discipline Responsible: [address], [employer]      Signature:  Domnick Mcburney

## 2020-03-16 NOTE — PROGRESS NOTES
Department of Psychiatry  Attending Progress Note      SUBJECTIVE:    29years old male with previous psychiatric history of depression, complicated by previous multiple suicide attempt by overdose of prescribed medication, who has been admitted to psychiatric unit secondary to suicidal ideations with plan to overdose. Patient has been seen in treatment team room. He has been wearing hospital attire. Patient reported no significant changes in his condition since admission, stated that his mood is still \"anxious and depressed\" today. Patient endorses good appetite and continues to report decreased quality of sleep, stated that he experienced difficulties to fall asleep and stay asleep during the last night, woke up multiple times during the night and could not fall asleep again. Patient is compliant with currently prescribed medications and denies any side effects. He endorses mild beneficial effect of psychotropic medications for his mood. Patient continues to report feeling of anxiety and depression, and rated level of his depression today, as 6-7 out of 10, and level of anxiety, as 9 out of 10, with 10 being the worst.  Patient did not attend any group activities in the unit since admission. He is not social with medical staff or other patients in the unit. Most of the time patient stays isolated in his room and leaves his room only for meals or medications. He performed his ADLs today. He denies current active suicidal or homicidal ideations, denies any plans. Also, patient denies any auditory and visual hallucinations. OBJECTIVE    Physical  VITALS:  /68   Pulse 75   Temp 96.5 °F (35.8 °C) (Temporal)   Resp 18   SpO2 99%   TEMPERATURE:  Current - Temp: 96.5 °F (35.8 °C);  Max - Temp  Av.7 °F (35.9 °C)  Min: 96.5 °F (35.8 °C)  Max: 96.9 °F (36.1 °C)  RESPIRATIONS RANGE: Resp  Av  Min: 18  Max: 20  PULSE RANGE: Pulse  Av  Min: 75  Max: 75  BLOOD PRESSURE RANGE:  Systolic (24hrs), Av , Min:121 , KVE:428   ; Diastolic (88RMS), VZK:01, Min:68, Max:94    Review of Systems: 14 point review of systems is negative    Psychological ROS: Positive for anxiety and depression    Mental Status Examination:   Appearance: Appropriately groomed, wearing casual civilian clothes. Made intermittent eye contact. Behavior: Anxious, cooperative. Mild psychomotor agitation appreciated. Gait unremarkable. Speech: Normal in tone, volume, and quality. Mood: \"Anxious, depressed\"   Affect: Mood congruent. Range is restricted. Thought Process: Appears linear and goal oriented. Thought Content: Patient does not have any current active suicidal and   homicidal ideations. No overt delusions or paranoia appreciated. Perceptions: Seems patient does not have any auditory or visual hallucinations at present time. Patient did not appear to be responding to internal stimuli. No overt psychosis. Orientation: to person, place, date, and situation. Alert. Language: Intact. Fund of information: Intact. Memory: recent and remote appear intact. Impulsivity: Mildly improved. Neurovegitative: Fair appetite, decreased sleep. Insight: Limited. Judgment: Impaired.       Data  No new lab test results to review    Medications  Current Facility-Administered Medications: lamoTRIgine (LAMICTAL) tablet 200 mg, 200 mg, Oral, BID  pantoprazole (PROTONIX) tablet 40 mg, 40 mg, Oral, Daily  clonazePAM (KLONOPIN) tablet 0.5 mg, 0.5 mg, Oral, BID  QUEtiapine (SEROQUEL) tablet 200 mg, 200 mg, Oral, Nightly  hydrOXYzine (ATARAX) tablet 25 mg, 25 mg, Oral, TID PRN  risperiDONE (RISPERDAL M-TABS) disintegrating tablet 2 mg, 2 mg, Oral, Q4H PRN  acetaminophen (TYLENOL) tablet 650 mg, 650 mg, Oral, Q4H PRN  polyethylene glycol (GLYCOLAX) packet 17 g, 17 g, Oral, Daily PRN  nicotine (NICODERM CQ) 21 MG/24HR 1 patch, 1 patch, Transdermal, Daily  traZODone (DESYREL) tablet 50 mg, 50 mg, Oral, Nightly PRN    ASSESSMENT AND PLAN    DSM 5 DIAGNOSIS:   Mood disorder unspecified  Suicidal ideation  Cannabis use disorder  Opiate use disorder    Plan:   1. Psychiatric Medications:   Continue current psychotropic medications as recommended. Patient denies side effects of currently prescribed medications. Will increase dose of trazodone from 50 mg to 100 mg and will start melatonin 3 mg at bedtime for insomnia. The risks, benefits, side effects, indications, contraindications, alternatives and adverse effects of the medications have been discussed with patient. 2. Medical Issues:    Continue medical monitoring by Dr. Margarette Barakat and associates. 3. Disposition:    Discharge patient home when he will be in stable mental condition and adjustment psychotropic medications will be done.      Amount of time spent with patient:    35 minutes with greater than 50% of the time spent in counseling and collaboration of care

## 2020-03-16 NOTE — PROGRESS NOTES
Tech reported BP was 136/94 when obtained, noted BP earlier on day shift that was 94/47, Dr. Adelso Cruz called and made aware of BP changes and reviewed with Dr. Adelso Cruz.     Merwyn Closs LPN

## 2020-03-16 NOTE — PROGRESS NOTES
Admission Note      Reason for admission/Target Symptom: Patient admitted to [facility] due to male here for mental health evaluation. He tells me he does not know what is going on he does not understand what is happening. But is making him depressed and he is having suicidal thoughts. He wanted to come in for evaluation voluntarily before he got to the point where he would overdose again. Apparently about 6 months ago [facility] he overdosed on Seroquel he does not know how long he was in the hospital and then he was sent to [facility] for 24 hours he thinks  Diagnoses: Mood disorder unspecified, Suicidal ideation, Cannabis use disorder, Opiate use disorder  UDS: Benzodiazepine , Cannabinoid , Opiate   BAL:  Neg    SW met with treatment team to discuss patient's treatment including care planning, discharge planning, and follow-up needs. Pt has been admitted to [facility]. Treatment team has identified patient's discharge needs as medication management and outpatient therapy/counseling. Pt confirmed  the need for ongoing treatment post inpatient stay. Pt was also provided a handout of contact information for drug and alcohol treatment centers and other community support service such as [facility], AA, and [facility] .

## 2020-03-17 VITALS
RESPIRATION RATE: 16 BRPM | BODY MASS INDEX: 32.49 KG/M2 | SYSTOLIC BLOOD PRESSURE: 109 MMHG | HEART RATE: 76 BPM | WEIGHT: 207 LBS | TEMPERATURE: 97 F | DIASTOLIC BLOOD PRESSURE: 64 MMHG | HEIGHT: 67 IN | OXYGEN SATURATION: 100 %

## 2020-03-17 PROCEDURE — 6370000000 HC RX 637 (ALT 250 FOR IP): Performed by: PSYCHIATRY & NEUROLOGY

## 2020-03-17 PROCEDURE — 99239 HOSP IP/OBS DSCHRG MGMT >30: CPT | Performed by: PSYCHIATRY & NEUROLOGY

## 2020-03-17 RX ORDER — LANOLIN ALCOHOL/MO/W.PET/CERES
3 CREAM (GRAM) TOPICAL NIGHTLY
Qty: 30 TABLET | Refills: 1 | Status: SHIPPED | OUTPATIENT
Start: 2020-03-17 | End: 2020-06-23

## 2020-03-17 RX ORDER — TRAZODONE HYDROCHLORIDE 100 MG/1
100 TABLET ORAL NIGHTLY
Qty: 30 TABLET | Refills: 1 | Status: SHIPPED | OUTPATIENT
Start: 2020-03-17 | End: 2020-06-23

## 2020-03-17 RX ADMIN — CLONAZEPAM 0.5 MG: 0.5 TABLET ORAL at 08:03

## 2020-03-17 RX ADMIN — PANTOPRAZOLE SODIUM 40 MG: 40 TABLET, DELAYED RELEASE ORAL at 08:03

## 2020-03-17 RX ADMIN — LAMOTRIGINE 200 MG: 100 TABLET ORAL at 08:05

## 2020-03-17 NOTE — PROGRESS NOTES
Group Note    Number of Participants in Group: 2  Number of Patients on Unit:7      Patient attended group:Yes  Reason for Absence:  Intervention for patient absence:        Type of Group:   Wrap-Up/Relaxation    Patient's Goal: See wrap up group sheet    Participation Level:     Active Listener           Patient Response to Learning: Yes    Patient's Behavior: Cooperative and Pleasant    Is Patient Social/Interacting: Yes    Relaxation:   Television:Yes   Reading:No   Game/Puzzle:No   Phone: No       Notes/Comments:             Electronically signed by Lola Sanchez LPN on 0/06/11 at 6:24 AM CDT

## 2020-03-17 NOTE — PROGRESS NOTES
Progress Note  Oral Dorantes  3/17/2020 12:49 AM  Subjective:   Admit Date:   3/14/2020      CC/ADMIT DX:       Interval History:   Reviewed overnight events and nursing notes. He has c/o migraine HA. I have reviewed all labs/diagnostics from the last 24hrs. ROS:   I have done a 10 point ROS and all are negative, except what is mentioned in the HPI. DIET GENERAL;    Medications:      melatonin  3 mg Oral Nightly    traZODone  100 mg Oral Nightly    vitamin D  50,000 Units Oral Weekly    lamoTRIgine  200 mg Oral BID    pantoprazole  40 mg Oral Daily    clonazePAM  0.5 mg Oral BID    QUEtiapine  200 mg Oral Nightly    nicotine  1 patch Transdermal Daily           Objective:   Vitals: BP (!) 160/90   Pulse 77   Temp 97.5 °F (36.4 °C) (Temporal)   Resp 16   Ht 5' 7\" (1.702 m)   Wt 207 lb (93.9 kg)   SpO2 100%   BMI 32.42 kg/m²  No intake or output data in the 24 hours ending 03/17/20 0049  General appearance: alert and cooperative with exam  Lungs: clear to auscultation bilaterally  Heart: RRR  Abdomen: soft, non-tender; bowel sounds normal; no masses,  no organomegaly  Extremities: extremities normal, atraumatic, no cyanosis or edema  Neurologic:  No obvious focal neurologic deficits. Assessment and Plan: Active Problems:    Bipolar 1 disorder (HCC)    Persistent mood (affective) disorder, unspecified (HCC)  Resolved Problems:    * No resolved hospital problems. *    Migraine HA    Vit D Def    Plan:  1. Continue present medication(s)   2. Replace Vit D  3. Treat migraine  4. Follow with Psych      Discharge planning:   home     Reviewed treatment plans with the patient and/or family.              Electronically signed by Wilber Golden MD on 3/17/2020 at 12:49 AM

## 2020-03-17 NOTE — PROGRESS NOTES
Group Therapy Note    Start Time: 800  End Time:  068  Number of Participants: 9    Type of Group: Community Meeting       Patient's Goal:  \"myself\"      Notes:      Participation Level:  Active Listener       Participation Quality: Appropriate      Thought Process/Content: Logical      Affective Functioning: Congruent      Mood: calm      Level of consciousness:  Alert      Modes of Intervention: Support      Discipline Responsible: Behavioral Health Tech II      Signature:  Renzo Babin

## 2020-03-17 NOTE — PLAN OF CARE
Problem: Discharge Planning:  Goal: Discharged to appropriate level of care  3/17/2020 1605 by Gilbert Anglin  Outcome: Ongoing      Group Therapy Note     Date: 3/17/2020  Start Time: 9934  End Time:  1600  Number of Participants: 6     Type of Group: Recovery     Wellness Binder Information  Module Name:  relapse prevention  Session Number:  2     Patient's Goal:  identifying early warning signs     Notes:  pt acknowledged negative thinking as a warning sign to help prevent relapse.      Status After Intervention:  Improved     Participation Level: Interactive     Participation Quality: Appropriate, Attentive, and Sharing        Speech:  normal        Thought Process/Content: Logical        Affective Functioning: Congruent        Mood: congruent        Level of consciousness:  Alert, Oriented x4, and Attentive        Response to Learning: Able to verbalize current knowledge/experience        Endings: None Reported     Modes of Intervention: Education        Discipline Responsible: Psychoeducational Specialist        Signature:  Gilbert Anglin

## 2020-03-17 NOTE — PLAN OF CARE
Problem: Health Maintenance - Impaired:  Goal: Ability to perform activities of daily living will improve  3/17/2020 1558 by Low Romero  Outcome: Ongoing   Group Therapy Note     Date: 3/17/2020  Start Time: 1430  End Time:  6585  Number of Participants: 6     Type of Group: Cognitive Skills     Wellness Binder Information  Module Name:  emotional wellness  Session Number:  1     Patient's Goal:  validation of feelings     Notes:  pt acknowledged to have feelings validated it may be necessary to share feelings with others.      Status After Intervention:  Improved     Participation Level: Interactive     Participation Quality: Appropriate, Attentive, and Sharing        Speech:  normal        Thought Process/Content: Logical        Affective Functioning: Congruent        Mood: congruent        Level of consciousness:  Alert, Oriented x4, and Attentive        Response to Learning: Able to verbalize current knowledge/experience        Endings: None Reported     Modes of Intervention: Education        Discipline Responsible: Psychoeducational Specialist        Signature:  Low Romero

## 2020-03-17 NOTE — PROGRESS NOTES
[facility] Adult Unit Daily Assessment  Nursing Progress Note    Room: [facility]/611-01   Name: Katie Tyler   Age: 29 y.o. Gender: male   Dx: <principal problem not specified>  Precautions: suicide risk  Inpatient Status: voluntary       SLEEP:    Sleep Quality Good  Sleep Medications: Yes   PRN Sleep Meds: No       MEDICAL:    Other PRN Meds: No   Med Compliant: Yes  Accu-Chek: No  Oxygen/CPAP/BiPAP: No  CIWA/CINA: No   PAIN Assessment: none or not receiving treatment  Side Effects from medication: No      PSYCH:    Depression: 0   Anxiety: 10  SI denies suicidal ideation   HI Negative for homicidal ideation      AVH:Absent      GENERAL:    Appetite: good    Social: Yes   Speech: normal   Appearance: appropriately dressed and healthy looking    GROUP:    Group Participation: Yes  Participation Quality: Active Listener    Notes:   Patient has been out in day area, social and interacts, has been watching television, appropriate during interview, took his Trazodone this evening, explained to patient that it would help him rest at night, patient reports that he had talked to the doctor and he advised him to take medication as well. No behaviors noted at this time.         Electronically signed by Da Dong LPN on 9/92/24 at 6:43 PM CDT

## 2020-03-17 NOTE — PROGRESS NOTES
Treatment Team Note:    SAHARA met with [facility] team to discuss Pts [address] plans. Progress/Behavior/Group Attendance: TBD    Target Symptoms/Reason for admission: Reason for admission/Target Symptom: Patient admitted to [facility] due to male here for mental health evaluation. Clemetine Fails tells me he does not know what is going on he does not understand what is happening. Gallito Dobbs is making him depressed and he is having suicidal thoughts. Clemetine Fails wanted to come in for evaluation voluntarily before he got to the point where he would overdose again.  Apparently about 6 months ago [facility] he overdosed on Seroquel he does not know how long he was in the hospital and then he was sent to [facility] for 24 hours he thinks  Diagnoses: Mood disorder unspecified, Suicidal ideation, Cannabis use disorder, Opiate use disorder  UDS: Benzodiazepine , Cannabinoid , Opiate   BAL:  Neg      AftercarePlan: [address] lives with: SAHARA will meet with pt to gather information. Collateral obtained from: SAHARA will meet with pt to gather release of information.   On:    Family Session: JONATHAN    Misc:

## 2020-03-17 NOTE — PLAN OF CARE
Problem: Discharge Planning:  Goal: Discharged to appropriate level of care  3/17/2020 1216 by Marlene Duarte  Outcome: Ongoing   Group Therapy Note     Date: 3/17/2020  Start Time: 1100  End Time:  9718  Number of Participants: 5     Type of Group: Psychoeducation     Wellness Binder Information  Module Name:  staying well  Session Number:  1     Patient's Goal:  daily maintenance and coping skills     Notes:  pt acknowledged use of positive coping skills daily to help stay well.      Status After Intervention:  Improved     Participation Level: Interactive     Participation Quality: Appropriate, Attentive, and Sharing        Speech:  normal        Thought Process/Content: Logical        Affective Functioning: Congruent        Mood: congruent        Level of consciousness:  Alert, Oriented x4, and Attentive        Response to Learning: Able to verbalize current knowledge/experience        Endings: None Reported     Modes of Intervention: Education        Discipline Responsible: Psychoeducational Specialist        Signature:  Marlene Duarte

## 2020-03-18 NOTE — PROGRESS NOTES
Discharge Note     Pt discharging on this date. Pt denies SI, HI, and AVH at this time. Pt reports improvement in behavior and is leaving unit in overall good condition. SW and pt discussed pt's follow up appointments and importance of attending appointments as scheduled, pt voiced understanding and agreement. Pt and SW also discussed pt safety plan and pt able to verbally identify: warning signs, coping strategies, places and people that help make the pt feel better/distract negative thoughts, friends/family/agencies/professionals the pt can reach out to in a crisis, and something that is important to the pt/worth living for. Pt provided the national suicide prevention hotline number (0-537-830-919-768-1509) as well as local community behavioral health [facility]) crisis number for emergencies (1-796-839-233-236-8153). Pt to follow up with:  [address] [facility]) on 03__/_18_/20 @ 9:00am. Patient will follow up with Marco Pena at [facility] for medication management, patient will be seen on _03_/25__/20 @ 10:00am for the med management appt.      Referral to out patient tobacco cessation counseling treatment:    Patient refused tobacco cessation counseling    SW offered to assist pt with transportation, pt reports that he has transportation

## 2020-03-18 NOTE — CARE COORDINATION
Follow up call completed. Writer was able to speak with the patient. Patient did not have any questions regarding their discharge.      Electronically signed by Osvaldo Noyola [facility] on 3/18/2020 at 8:55 AM

## 2020-03-18 NOTE — PROGRESS NOTES
Progress Note  Liliamasha Perez  3/17/2020 11:28 PM  Subjective:   Admit Date:   3/14/2020      CC/ADMIT DX:       Interval History:   Reviewed overnight events and nursing notes. He has no new medical complaints. I have reviewed all labs/diagnostics from the last 24hrs. ROS:   I have done a 10 point ROS and all are negative, except what is mentioned in the HPI. No diet orders on file    Medications:             Objective:   Vitals: /64   Pulse 76   Temp 97 °F (36.1 °C) (Temporal)   Resp 16   Ht 5' 7\" (1.702 m)   Wt 207 lb (93.9 kg)   SpO2 100%   BMI 32.42 kg/m²  No intake or output data in the 24 hours ending 03/17/20 3348  General appearance: alert and cooperative with exam  Lungs: clear to auscultation bilaterally  Heart: RRR  Abdomen: soft, non-tender; bowel sounds normal; no masses,  no organomegaly  Extremities: extremities normal, atraumatic, no cyanosis or edema  Neurologic:  No obvious focal neurologic deficits. Assessment and Plan: Active Problems:    Bipolar 1 disorder (HCC)    Persistent mood (affective) disorder, unspecified (HCC)  Resolved Problems:    * No resolved hospital problems. *    Migraine HA    Vit D Def    Elevated BP    Plan:  1. Continue present medication(s)   2. Follow with BP  3. Follow with Psych      Discharge planning:   home     Reviewed treatment plans with the patient and/or family.              Electronically signed by Lennox Corrente, MD on 3/17/2020 at 11:28 PM

## 2020-03-19 ENCOUNTER — OFFICE VISIT (OUTPATIENT)
Dept: INTERNAL MEDICINE | Facility: CLINIC | Age: 35
End: 2020-03-19

## 2020-03-19 VITALS
HEIGHT: 69 IN | WEIGHT: 204.38 LBS | OXYGEN SATURATION: 99 % | DIASTOLIC BLOOD PRESSURE: 81 MMHG | BODY MASS INDEX: 30.27 KG/M2 | HEART RATE: 95 BPM | SYSTOLIC BLOOD PRESSURE: 126 MMHG | TEMPERATURE: 97.7 F

## 2020-03-19 DIAGNOSIS — N52.9 ERECTILE DYSFUNCTION, UNSPECIFIED ERECTILE DYSFUNCTION TYPE: Primary | ICD-10-CM

## 2020-03-19 DIAGNOSIS — F31.9 BIPOLAR 1 DISORDER (HCC): ICD-10-CM

## 2020-03-19 PROCEDURE — 99213 OFFICE O/P EST LOW 20 MIN: CPT | Performed by: INTERNAL MEDICINE

## 2020-03-19 RX ORDER — LANOLIN ALCOHOL/MO/W.PET/CERES
3 CREAM (GRAM) TOPICAL
COMMUNITY
Start: 2020-03-17

## 2020-03-19 RX ORDER — ERGOCALCIFEROL 1.25 MG/1
50000 CAPSULE ORAL
COMMUNITY
Start: 2020-03-16 | End: 2020-03-19 | Stop reason: SDUPTHER

## 2020-03-19 RX ORDER — TRAZODONE HYDROCHLORIDE 100 MG/1
100 TABLET ORAL
COMMUNITY
Start: 2020-03-17 | End: 2020-04-02 | Stop reason: SDUPTHER

## 2020-03-19 RX ORDER — ERGOCALCIFEROL 1.25 MG/1
CAPSULE ORAL
COMMUNITY
Start: 2020-03-17

## 2020-03-19 RX ORDER — SILDENAFIL 100 MG/1
100 TABLET, FILM COATED ORAL DAILY PRN
Qty: 5 TABLET | Refills: 1 | Status: SHIPPED | OUTPATIENT
Start: 2020-03-19

## 2020-03-19 NOTE — PROGRESS NOTES
Subjective     Chief Complaint   Patient presents with   • Medication Problem       History of Present Illness  Patient was in psych facility for three days.   Was prescribed Melatonin and Trazodone and Vitamin D.   Patient was DCd from [facility] without wean.  Patient states that he feels different and it is good.   Patient is going to [facility] and got a treatment plan established.   Patient has prescriptions for new medications.     Patient's PMR from outside medical facility reviewed and noted.    Review of Systems   Constitutional: Negative for activity change and fever.   Eyes: Negative for discharge and redness.   Gastrointestinal: Negative for diarrhea, nausea and vomiting.   Skin: Negative for color change and wound.   Psychiatric/Behavioral: Negative for self-injury, sleep disturbance and suicidal ideas.        Otherwise complete ROS reviewed and negative except as mentioned in the HPI.    Past Medical History:   Past Medical History:   Diagnosis Date   • Anger    • Anxiety    • Bipolar 1 disorder (CMS/[facility])    • Broken foot     right Heel   • Headache      Past Surgical History:History reviewed. No pertinent surgical history.     Social History:  reports that he has been smoking electronic cigarette. He has been smoking about 1.00 pack per day. He has quit using smokeless tobacco.  His smokeless tobacco use included snuff. Drug use questions deferred to the physician. He reports that he does not drink alcohol.    Family History: Family history is unknown by patient.       Allergies:  Allergies   Allergen Reactions   • Tetracycline Rash     Had when he was child       Medications:  Prior to Admission medications    Medication Sig Start Date End Date Taking? Authorizing Provider   melatonin 3 MG tablet Take 3 mg by mouth. 3/17/20  Yes ProviderDwayne MD   traZODone (DESYREL) 100 MG tablet Take 100 mg by mouth. 3/17/20  Yes ProviderDwayne MD   vitamin D (ERGOCALCIFEROL) 1.25 MG (24227 UT)  "capsule capsule Take 50,000 Units by mouth. 3/16/20 3/19/20 Yes Provider, MD Dwayne   ALPRAZolam (XANAX) 1 MG tablet Take 1 tablet by mouth 3 (Three) Times a Day As Needed for Anxiety. 3/10/20   Radha Sanford DO   famotidine (PEPCID) 40 MG tablet Take 1 tablet by mouth 2 (Two) Times a Day. 2/11/20   Radha Sanford DO   haloperidol (HALDOL) 2 MG tablet Take 1 tablet by mouth 2 (Two) Times a Day. 2/11/20   Radha Sanford DO   ibuprofen (ADVIL,MOTRIN) 800 MG tablet Take 1 tablet by mouth 2 (Two) Times a Day. 2/11/20   Radha Sanford DO   lamoTRIgine (LaMICtal) 200 MG tablet Take 1 tablet by mouth 2 (Two) Times a Day. 2/11/20   Radha Sanford DO   pantoprazole (PROTONIX) 40 MG EC tablet Take 1 tablet by mouth Daily. 2/11/20   Radha Sanford DO   QUEtiapine (SEROquel) 200 MG tablet Take 1 tablet by mouth Daily. 2/11/20   Radha Sanford DO   SUMAtriptan (IMITREX) 100 MG tablet Take 1 tablet by mouth As Needed for Migraine. 2/11/20   Radha Sanford DO   vitamin D (ERGOCALCIFEROL) 1.25 MG (73133 UT) capsule capsule  3/17/20   Provider, MD Dwayne       Objective     Vital Signs: /81 (BP Location: Right arm, Patient Position: Sitting, Cuff Size: Adult)   Pulse 95   Temp 97.7 °F (36.5 °C) (Oral)   Ht 175.3 cm (69\")   Wt 92.7 kg (204 lb 6 oz)   SpO2 99%   BMI 30.18 kg/m²   Physical Exam   HENT:   Head: Normocephalic and atraumatic.   Nose: Nose normal.   Mouth/Throat: Oropharynx is clear and moist.   Eyes: Conjunctivae and EOM are normal.   Neck: Normal range of motion. Neck supple.   Cardiovascular: Normal rate, regular rhythm and normal heart sounds.   Pulmonary/Chest: Effort normal and breath sounds normal.   Abdominal: Soft. Bowel sounds are normal.   Musculoskeletal: He exhibits no edema or tenderness.   Neurological: He is alert. No cranial nerve deficit.   Skin: Skin is warm and dry.   Psychiatric: He has a normal mood and affect.   Vitals " reviewed.      Patient's Body mass index is 30.18 kg/m². BMI is within normal parameters. No follow-up required..      Results Reviewed:  Glucose   Date Value Ref Range Status   03/14/2020 99 74 - 109 mg/dL Final     BUN   Date Value Ref Range Status   03/14/2020 14 6 - 20 mg/dL Final     Creatinine   Date Value Ref Range Status   03/14/2020 1.1 0.5 - 1.2 mg/dL Final     Sodium   Date Value Ref Range Status   03/14/2020 142 136 - 145 mmol/L Final     Potassium   Date Value Ref Range Status   03/10/2020 4.8 3.5 - 5.2 mmol/L Final     Chloride   Date Value Ref Range Status   03/14/2020 106 98 - 111 mmol/L Final     CO2   Date Value Ref Range Status   03/14/2020 27 22 - 29 mmol/L Final     Calcium   Date Value Ref Range Status   03/14/2020 9.2 8.6 - 10.0 mg/dL Final     ALT (SGPT)   Date Value Ref Range Status   03/14/2020 19 5 - 41 U/L Final     AST (SGOT)   Date Value Ref Range Status   03/14/2020 15 5 - 40 U/L Final     WBC   Date Value Ref Range Status   03/14/2020 8.5 4.8 - 10.8 K/uL Final     Hematocrit   Date Value Ref Range Status   03/14/2020 40.9 (L) 42.0 - 52.0 % Final     Platelets   Date Value Ref Range Status   03/14/2020 299 130 - 400 K/uL Final     Triglycerides   Date Value Ref Range Status   03/14/2020 68 0 - 149 mg/dL Final     HDL Cholesterol   Date Value Ref Range Status   03/14/2020 45 (L) 55 - 121 mg/dL Final     Comment:     VALUES>60 MG/DL ARE ASSOCIATED WITH A DECREASED RISK OF  ATHEROSCLEROTIC CARDIOVASCULAR DISEASE     LDL Cholesterol    Date Value Ref Range Status   03/14/2020 136 <100 mg/dL Final     Comment:     <100 MG/DL=OPITIMAL    100-129 MG/DL=DESIRABLE    130-159 MG/DL BORDERLINE=INCREASED RISK OF ATHEROSCLEROTIC  CARDIOVASCULAR DISEASE    > OR = 160 MG/DL=ASSOCIATED WITH AN INCREASE RISK OF  ATHEROSCLEROTIC CARDIOVASCULAR DISEASE     Hemoglobin A1C   Date Value Ref Range Status   03/14/2020 5.0 4.0 - 6.0 % Final     Comment:     HbA1c levels >6% are an indication of hyperglycemia  during the preceding 2  to 3 months or longer.    HbA1c levels may reach 20% or higher in poorly controlled diabetes.  Therapeutic action is suggested at levels above 8%.    Diabetes patients with HbA1c levels below 7% meet the goal of the American  Diabetes Association.    HbA1c levels below the established reference range may indicate recent  episodes of hypoglycemia, the presence of Hb variants, or shortened lifetime  of erythrocytes.          Assessment / Plan     Assessment/Plan:  1. Erectile dysfunction, unspecified erectile dysfunction type  - sildenafil (VIAGRA) 100 MG tablet; Take 1 tablet by mouth Daily As Needed for Erectile Dysfunction.  Dispense: 5 tablet; Refill: 1    2. Bipolar 1 disorder (CMS/[facility])  - Continue current therapy and outpatient counseling.         Return if symptoms worsen or fail to improve, for Next scheduled follow up. unless patient needs to be seen sooner or acute issues arise.    Code Status: Full    I have discussed the patient results/orders and and plan/recommendation with them at today's visit.      Radha Sanford, DO   03/19/2020

## 2020-04-02 ENCOUNTER — TELEMEDICINE (OUTPATIENT)
Dept: INTERNAL MEDICINE | Facility: CLINIC | Age: 35
End: 2020-04-02

## 2020-04-02 DIAGNOSIS — M79.671 RIGHT FOOT PAIN: ICD-10-CM

## 2020-04-02 DIAGNOSIS — F41.9 ANXIETY: ICD-10-CM

## 2020-04-02 DIAGNOSIS — R12 HEARTBURN: ICD-10-CM

## 2020-04-02 DIAGNOSIS — F31.9 BIPOLAR 1 DISORDER (HCC): ICD-10-CM

## 2020-04-02 DIAGNOSIS — G47.00 INSOMNIA, UNSPECIFIED TYPE: Primary | ICD-10-CM

## 2020-04-02 RX ORDER — TRAZODONE HYDROCHLORIDE 100 MG/1
100 TABLET ORAL NIGHTLY
Qty: 30 TABLET | Refills: 0 | Status: SHIPPED | OUTPATIENT
Start: 2020-04-02

## 2020-04-02 RX ORDER — IBUPROFEN 800 MG/1
800 TABLET ORAL 2 TIMES DAILY
Qty: 60 TABLET | Refills: 3 | Status: SHIPPED | OUTPATIENT
Start: 2020-04-02 | End: 2020-05-28 | Stop reason: SDUPTHER

## 2020-04-02 RX ORDER — FAMOTIDINE 40 MG/1
40 TABLET, FILM COATED ORAL 2 TIMES DAILY
Qty: 60 TABLET | Refills: 3 | Status: SHIPPED | OUTPATIENT
Start: 2020-04-02 | End: 2020-04-30 | Stop reason: SDUPTHER

## 2020-04-02 RX ORDER — LAMOTRIGINE 200 MG/1
200 TABLET ORAL 2 TIMES DAILY
Qty: 60 TABLET | Refills: 2 | Status: SHIPPED | OUTPATIENT
Start: 2020-04-02 | End: 2020-04-30 | Stop reason: SDUPTHER

## 2020-04-02 RX ORDER — ALPRAZOLAM 1 MG/1
1 TABLET ORAL 3 TIMES DAILY PRN
Qty: 90 TABLET | Refills: 0 | Status: SHIPPED | OUTPATIENT
Start: 2020-04-02 | End: 2020-04-30 | Stop reason: SDUPTHER

## 2020-04-02 RX ORDER — QUETIAPINE FUMARATE 200 MG/1
200 TABLET, FILM COATED ORAL DAILY
Qty: 30 TABLET | Refills: 2 | Status: SHIPPED | OUTPATIENT
Start: 2020-04-02 | End: 2020-04-30 | Stop reason: SDUPTHER

## 2020-04-02 NOTE — PROGRESS NOTES
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   States that he is working to keep his mind off things. Is completely off the Haldol. No problems.   States that he is sleeping well with Trazodone.   Using an E-cig on video chat.   Anxiety is good. States that he is out by himself working and is doing well.  States that he feels like his head is on straight.     Patient's PMR from outside medical facility reviewed and noted.    Review of Systems   Constitutional: Negative for chills and fever.   HENT: Positive for congestion. Negative for sore throat.    Respiratory: Negative for cough and shortness of breath.    Cardiovascular: Negative for chest pain and leg swelling.   Gastrointestinal: Negative for diarrhea, nausea and vomiting.   Genitourinary: Negative for dysuria and hematuria.   Allergic/Immunologic: Positive for environmental allergies.   Psychiatric/Behavioral: Negative for dysphoric mood and sleep disturbance. The patient is not nervous/anxious.         Otherwise complete ROS reviewed and negative except as mentioned in the HPI.    Past Medical History:   Past Medical History:   Diagnosis Date   • Anger    • Anxiety    • Bipolar 1 disorder (CMS/HCC)    • Broken foot     right Heel   • Headache      Past Surgical History:No past surgical history on file.     Social History:  reports that he has been smoking electronic cigarette. He has been smoking about 1.00 pack per day. He has quit using smokeless tobacco.  His smokeless tobacco use included snuff. Drug use questions deferred to the physician. He reports that he does not drink alcohol.    Family History: Family history is unknown by patient.       Allergies:  Allergies   Allergen Reactions   • Tetracycline Rash     Had when he was child       Medications:  Prior to Admission  medications    Medication Sig Start Date End Date Taking? Authorizing Provider   ALPRAZolam (XANAX) 1 MG tablet Take 1 tablet by mouth 3 (Three) Times a Day As Needed for Anxiety. 3/10/20   Radha Sanford DO   famotidine (PEPCID) 40 MG tablet Take 1 tablet by mouth 2 (Two) Times a Day. 2/11/20   Radha Sanford DO   haloperidol (HALDOL) 2 MG tablet Take 1 tablet by mouth 2 (Two) Times a Day. 2/11/20   Radha Sanford DO   ibuprofen (ADVIL,MOTRIN) 800 MG tablet Take 1 tablet by mouth 2 (Two) Times a Day. 2/11/20   Radha Sanford DO   lamoTRIgine (LaMICtal) 200 MG tablet Take 1 tablet by mouth 2 (Two) Times a Day. 2/11/20   Radha Sanford DO   melatonin 3 MG tablet Take 3 mg by mouth. 3/17/20   Dwayne Jeff MD   pantoprazole (PROTONIX) 40 MG EC tablet Take 1 tablet by mouth Daily. 2/11/20   Radha Sanford DO   QUEtiapine (SEROquel) 200 MG tablet Take 1 tablet by mouth Daily. 2/11/20   Radha Sanford DO   sildenafil (VIAGRA) 100 MG tablet Take 1 tablet by mouth Daily As Needed for Erectile Dysfunction. 3/19/20   Radha Sanford DO   SUMAtriptan (IMITREX) 100 MG tablet Take 1 tablet by mouth As Needed for Migraine. 2/11/20   Radha Sanford DO   traZODone (DESYREL) 100 MG tablet Take 100 mg by mouth. 3/17/20   Dwayne Jeff MD   vitamin D (ERGOCALCIFEROL) 1.25 MG (33892 UT) capsule capsule  3/17/20   Dwayne Jeff MD       Objective     Vital Signs: There were no vitals taken for this visit.  Physical Exam   Constitutional: He appears well-developed and well-nourished.   HENT:   Head: Normocephalic and atraumatic.   Eyes: EOM are normal. No scleral icterus.   Musculoskeletal: Normal range of motion. He exhibits no deformity.   Neurological: He is alert. Coordination normal.   Skin: Skin is warm and dry.   Psychiatric: He has a normal mood and affect. Thought content normal.       Patient's There is no height or weight on file to calculate BMI. BMI is  within normal parameters. No follow-up required..      Results Reviewed:  Glucose   Date Value Ref Range Status   03/14/2020 99 74 - 109 mg/dL Final     BUN   Date Value Ref Range Status   03/14/2020 14 6 - 20 mg/dL Final     Creatinine   Date Value Ref Range Status   03/14/2020 1.1 0.5 - 1.2 mg/dL Final     Sodium   Date Value Ref Range Status   03/14/2020 142 136 - 145 mmol/L Final     Potassium   Date Value Ref Range Status   03/10/2020 4.8 3.5 - 5.2 mmol/L Final     Chloride   Date Value Ref Range Status   03/14/2020 106 98 - 111 mmol/L Final     CO2   Date Value Ref Range Status   03/14/2020 27 22 - 29 mmol/L Final     Calcium   Date Value Ref Range Status   03/14/2020 9.2 8.6 - 10.0 mg/dL Final     ALT (SGPT)   Date Value Ref Range Status   03/14/2020 19 5 - 41 U/L Final     AST (SGOT)   Date Value Ref Range Status   03/14/2020 15 5 - 40 U/L Final     WBC   Date Value Ref Range Status   03/14/2020 8.5 4.8 - 10.8 K/uL Final     Hematocrit   Date Value Ref Range Status   03/14/2020 40.9 (L) 42.0 - 52.0 % Final     Platelets   Date Value Ref Range Status   03/14/2020 299 130 - 400 K/uL Final     Triglycerides   Date Value Ref Range Status   03/14/2020 68 0 - 149 mg/dL Final     HDL Cholesterol   Date Value Ref Range Status   03/14/2020 45 (L) 55 - 121 mg/dL Final     Comment:     VALUES>60 MG/DL ARE ASSOCIATED WITH A DECREASED RISK OF  ATHEROSCLEROTIC CARDIOVASCULAR DISEASE     LDL Cholesterol    Date Value Ref Range Status   03/14/2020 136 <100 mg/dL Final     Comment:     <100 MG/DL=OPITIMAL    100-129 MG/DL=DESIRABLE    130-159 MG/DL BORDERLINE=INCREASED RISK OF ATHEROSCLEROTIC  CARDIOVASCULAR DISEASE    > OR = 160 MG/DL=ASSOCIATED WITH AN INCREASE RISK OF  ATHEROSCLEROTIC CARDIOVASCULAR DISEASE     Hemoglobin A1C   Date Value Ref Range Status   03/14/2020 5.0 4.0 - 6.0 % Final     Comment:     HbA1c levels >6% are an indication of hyperglycemia during the preceding 2  to 3 months or longer.    HbA1c levels  may reach 20% or higher in poorly controlled diabetes.  Therapeutic action is suggested at levels above 8%.    Diabetes patients with HbA1c levels below 7% meet the goal of the American  Diabetes Association.    HbA1c levels below the established reference range may indicate recent  episodes of hypoglycemia, the presence of Hb variants, or shortened lifetime  of erythrocytes.          Assessment / Plan     Assessment/Plan:  1. Anxiety  - ALPRAZolam (XANAX) 1 MG tablet; Take 1 tablet by mouth 3 (Three) Times a Day As Needed for Anxiety.  Dispense: 90 tablet; Refill: 0    2. Heartburn  - famotidine (Pepcid) 40 MG tablet; Take 1 tablet by mouth 2 (Two) Times a Day.  Dispense: 60 tablet; Refill: 3    3. Right foot pain  - ibuprofen (ADVIL,MOTRIN) 800 MG tablet; Take 1 tablet by mouth 2 (Two) Times a Day.  Dispense: 60 tablet; Refill: 3    4. Bipolar 1 disorder (CMS/HCC)  - lamoTRIgine (LaMICtal) 200 MG tablet; Take 1 tablet by mouth 2 (Two) Times a Day.  Dispense: 60 tablet; Refill: 2  - QUEtiapine (SEROquel) 200 MG tablet; Take 1 tablet by mouth Daily.  Dispense: 30 tablet; Refill: 2    5. Insomnia, unspecified type  - traZODone (DESYREL) 100 MG tablet; Take 1 tablet by mouth Every Night.  Dispense: 30 tablet; Refill: 0        3 months, unless patient needs to be seen sooner or acute issues arise.    Code Status: Full    I have discussed the patient results/orders and and plan/recommendation with them at today's visit.      Radha Sanford,    04/02/2020

## 2020-04-30 ENCOUNTER — TELEMEDICINE (OUTPATIENT)
Dept: INTERNAL MEDICINE | Facility: CLINIC | Age: 35
End: 2020-04-30

## 2020-04-30 DIAGNOSIS — F41.9 ANXIETY: ICD-10-CM

## 2020-04-30 DIAGNOSIS — F31.9 BIPOLAR 1 DISORDER (HCC): ICD-10-CM

## 2020-04-30 DIAGNOSIS — R12 HEARTBURN: ICD-10-CM

## 2020-04-30 PROCEDURE — 99213 OFFICE O/P EST LOW 20 MIN: CPT | Performed by: INTERNAL MEDICINE

## 2020-04-30 RX ORDER — ALPRAZOLAM 1 MG/1
1 TABLET ORAL 3 TIMES DAILY PRN
Qty: 90 TABLET | Refills: 0 | Status: SHIPPED | OUTPATIENT
Start: 2020-04-30 | End: 2020-05-28 | Stop reason: SDUPTHER

## 2020-04-30 RX ORDER — LAMOTRIGINE 200 MG/1
200 TABLET ORAL 2 TIMES DAILY
Qty: 60 TABLET | Refills: 2 | Status: SHIPPED | OUTPATIENT
Start: 2020-04-30 | End: 2020-05-28 | Stop reason: SDUPTHER

## 2020-04-30 RX ORDER — QUETIAPINE FUMARATE 200 MG/1
200 TABLET, FILM COATED ORAL DAILY
Qty: 30 TABLET | Refills: 2 | Status: SHIPPED | OUTPATIENT
Start: 2020-04-30

## 2020-04-30 RX ORDER — FAMOTIDINE 40 MG/1
40 TABLET, FILM COATED ORAL 2 TIMES DAILY
Qty: 60 TABLET | Refills: 11 | Status: SHIPPED | OUTPATIENT
Start: 2020-04-30 | End: 2020-05-28 | Stop reason: SDUPTHER

## 2020-04-30 NOTE — PROGRESS NOTES
Subjective     Follow up on Bipolar disorder    History of Present Illness  Patient states that he is working. Doing [employer]. Child support has taken his stimulus and tax check.   States that he is not working at [employer].     Patient states that he has not been taking the Trazadone. Is having problems sleeping and not going to bed until 0100.   Patient states that he vapes.     Patient's PMR from outside medical facility reviewed and noted.    Review of Systems   Constitutional: Negative for chills and fever.   HENT: Negative for congestion, sneezing and sore throat.    Respiratory: Negative for cough and shortness of breath.    Gastrointestinal: Negative for constipation, diarrhea, nausea and vomiting.   Genitourinary: Negative for dysuria and hematuria.   Psychiatric/Behavioral: Positive for behavioral problems and sleep disturbance.        Otherwise complete ROS reviewed and negative except as mentioned in the HPI.    Past Medical History:   Past Medical History:   Diagnosis Date   • Anger    • Anxiety    • Bipolar 1 disorder (CMS/[facility])    • Broken foot     right Heel   • Headache      Past Surgical History:No past surgical history on file.     Social History:  reports that he has been smoking electronic cigarette. He has been smoking about 1.00 pack per day. He has quit using smokeless tobacco.  His smokeless tobacco use included snuff. Drug use questions deferred to the physician. He reports that he does not drink alcohol.    Family History: Family history is unknown by patient.       Allergies:  Allergies   Allergen Reactions   • Tetracycline Rash     Had when he was child       Medications:  Prior to Admission medications    Medication Sig Start Date End Date Taking? Authorizing Provider   ALPRAZolam (XANAX) 1 MG tablet Take 1 tablet by mouth 3 (Three) Times a Day As Needed for Anxiety. 4/2/20   Radha Sanford, DO   famotidine (Pepcid) 40 MG tablet Take 1 tablet by mouth 2 (Two) Times a Day.  4/2/20   Radha Sanford DO   ibuprofen (ADVIL,MOTRIN) 800 MG tablet Take 1 tablet by mouth 2 (Two) Times a Day. 4/2/20   Radha Sanford DO   lamoTRIgine (LaMICtal) 200 MG tablet Take 1 tablet by mouth 2 (Two) Times a Day. 4/2/20   Radha Sanford DO   melatonin 3 MG tablet Take 3 mg by mouth. 3/17/20   Dwayne Jeff MD   QUEtiapine (SEROquel) 200 MG tablet Take 1 tablet by mouth Daily. 4/2/20   Radha Sanford DO   sildenafil (VIAGRA) 100 MG tablet Take 1 tablet by mouth Daily As Needed for Erectile Dysfunction. 3/19/20   Radha Sanford DO   SUMAtriptan (IMITREX) 100 MG tablet Take 1 tablet by mouth As Needed for Migraine. 2/11/20   Radha Sanford DO   traZODone (DESYREL) 100 MG tablet Take 1 tablet by mouth Every Night. 4/2/20   Radha Sanford DO   vitamin D (ERGOCALCIFEROL) 1.25 MG (50940 UT) capsule capsule  3/17/20   ProviderDwayne MD       Objective     Vital Signs: There were no vitals taken for this visit.  Physical Exam  Video visit. Patient appears to be in his usual health.     Patient's There is no height or weight on file to calculate BMI. BMI is within normal parameters. No follow-up required..      Results Reviewed:  Glucose   Date Value Ref Range Status   03/14/2020 99 74 - 109 mg/dL Final     BUN   Date Value Ref Range Status   03/14/2020 14 6 - 20 mg/dL Final     Creatinine   Date Value Ref Range Status   03/14/2020 1.1 0.5 - 1.2 mg/dL Final     Sodium   Date Value Ref Range Status   03/14/2020 142 136 - 145 mmol/L Final     Potassium   Date Value Ref Range Status   03/10/2020 4.8 3.5 - 5.2 mmol/L Final     Chloride   Date Value Ref Range Status   03/14/2020 106 98 - 111 mmol/L Final     CO2   Date Value Ref Range Status   03/14/2020 27 22 - 29 mmol/L Final     Calcium   Date Value Ref Range Status   03/14/2020 9.2 8.6 - 10.0 mg/dL Final     ALT (SGPT)   Date Value Ref Range Status   03/14/2020 19 5 - 41 U/L Final     AST (SGOT)   Date Value Ref Range  Status   03/14/2020 15 5 - 40 U/L Final     WBC   Date Value Ref Range Status   03/14/2020 8.5 4.8 - 10.8 K/uL Final     Hematocrit   Date Value Ref Range Status   03/14/2020 40.9 (L) 42.0 - 52.0 % Final     Platelets   Date Value Ref Range Status   03/14/2020 299 130 - 400 K/uL Final     Triglycerides   Date Value Ref Range Status   03/14/2020 68 0 - 149 mg/dL Final     HDL Cholesterol   Date Value Ref Range Status   03/14/2020 45 (L) 55 - 121 mg/dL Final     Comment:     VALUES>60 MG/DL ARE ASSOCIATED WITH A DECREASED RISK OF  ATHEROSCLEROTIC CARDIOVASCULAR DISEASE     LDL Cholesterol    Date Value Ref Range Status   03/14/2020 136 <100 mg/dL Final     Comment:     <100 MG/DL=OPITIMAL    100-129 MG/DL=DESIRABLE    130-159 MG/DL BORDERLINE=INCREASED RISK OF ATHEROSCLEROTIC  CARDIOVASCULAR DISEASE    > OR = 160 MG/DL=ASSOCIATED WITH AN INCREASE RISK OF  ATHEROSCLEROTIC CARDIOVASCULAR DISEASE     Hemoglobin A1C   Date Value Ref Range Status   03/14/2020 5.0 4.0 - 6.0 % Final     Comment:     HbA1c levels >6% are an indication of hyperglycemia during the preceding 2  to 3 months or longer.    HbA1c levels may reach 20% or higher in poorly controlled diabetes.  Therapeutic action is suggested at levels above 8%.    Diabetes patients with HbA1c levels below 7% meet the goal of the American  Diabetes Association.    HbA1c levels below the established reference range may indicate recent  episodes of hypoglycemia, the presence of Hb variants, or shortened lifetime  of erythrocytes.          Assessment / Plan     Assessment/Plan:  1. Heartburn  - famotidine (Pepcid) 40 MG tablet; Take 1 tablet by mouth 2 (Two) Times a Day.  Dispense: 60 tablet; Refill: 11    2. Anxiety  - ALPRAZolam (XANAX) 1 MG tablet; Take 1 tablet by mouth 3 (Three) Times a Day As Needed for Anxiety.  Dispense: 90 tablet; Refill: 0    3. Bipolar 1 disorder (CMS/HCC)  - lamoTRIgine (LaMICtal) 200 MG tablet; Take 1 tablet by mouth 2 (Two) Times a Day.   Dispense: 60 tablet; Refill: 2  - QUEtiapine (SEROquel) 200 MG tablet; Take 1 tablet by mouth Daily.  Dispense: 30 tablet; Refill: 2        2 months unless patient needs to be seen sooner or acute issues arise.    Code Status: Full    I have discussed the patient results/orders and and plan/recommendation with them at today's visit.      Radha Sanford,    04/30/2020

## 2020-05-01 ENCOUNTER — TELEPHONE (OUTPATIENT)
Dept: INTERNAL MEDICINE | Facility: CLINIC | Age: 35
End: 2020-05-01

## 2020-05-01 NOTE — TELEPHONE ENCOUNTER
PT CALLED STATING THAT HE NEEDS A REQUEST SENT OVER TO HIS PHARMACY IN ORDER FOR THEM TO FILL THEM. HE STATED THAT DR ANTHONY TOLD HIM YESTERDAY THAT SHE WOULD SEND A REQUEST OVER.

## 2020-05-01 NOTE — TELEPHONE ENCOUNTER
Spoke with pharmacy,  They have sent pt his medication in the mail,  I will contact pt with this info.

## 2020-05-04 ENCOUNTER — TELEPHONE (OUTPATIENT)
Dept: INTERNAL MEDICINE | Facility: CLINIC | Age: 35
End: 2020-05-04

## 2020-05-05 NOTE — TELEPHONE ENCOUNTER
PT CALLED YESTERDAY COMPLAINING ABOUT HIS MEDS BEING SENT TO THE WRONG PHARMACY. THE PHARMACY THAT WAS IN THE COMPUTER WAS NOT THE PHARMACY HE WANTED HIS MEDS SENT TO. HE USED [employer] LANGUAGE AT BOTH [facility] AND ME WHEN WE WERE TRYING TO EXPLAIN THE SITUATION THAT HE WAS NOT ANSWERING OUR PHONE CALLS REGARDING HIS MED REFILLS LAST Thursday AND Friday  THEREFORE WE SENT HIS MEDICATION TO THE PHARMACY THAT WAS IN THE COMPUTER. HE WAS RUDE AND WOULD NOT HEAR US OUT. THIS HAS NOT BEEN THE FIRST TIME HE HAS SPOKEN TO ME IN THIS MANNER. I WAS TOLD BY JAVED THAT THE NEXT TIME HE SPEAKS TO US IN THAT MANNER TO LET HER TAKE CARE OF IT.

## 2020-05-28 ENCOUNTER — TELEMEDICINE (OUTPATIENT)
Dept: INTERNAL MEDICINE | Facility: CLINIC | Age: 35
End: 2020-05-28

## 2020-05-28 DIAGNOSIS — R12 HEARTBURN: ICD-10-CM

## 2020-05-28 DIAGNOSIS — F41.9 ANXIETY: ICD-10-CM

## 2020-05-28 DIAGNOSIS — M79.671 RIGHT FOOT PAIN: ICD-10-CM

## 2020-05-28 DIAGNOSIS — F31.9 BIPOLAR 1 DISORDER (HCC): ICD-10-CM

## 2020-05-28 PROCEDURE — 99213 OFFICE O/P EST LOW 20 MIN: CPT | Performed by: INTERNAL MEDICINE

## 2020-05-28 RX ORDER — LAMOTRIGINE 200 MG/1
200 TABLET ORAL 2 TIMES DAILY
Qty: 60 TABLET | Refills: 0 | Status: SHIPPED | OUTPATIENT
Start: 2020-05-28

## 2020-05-28 RX ORDER — FAMOTIDINE 40 MG/1
40 TABLET, FILM COATED ORAL 2 TIMES DAILY
Qty: 60 TABLET | Refills: 0 | Status: SHIPPED | OUTPATIENT
Start: 2020-05-28

## 2020-05-28 RX ORDER — ALPRAZOLAM 1 MG/1
1 TABLET ORAL 3 TIMES DAILY PRN
Qty: 90 TABLET | Refills: 0 | Status: SHIPPED | OUTPATIENT
Start: 2020-05-28

## 2020-05-28 RX ORDER — IBUPROFEN 800 MG/1
800 TABLET ORAL 2 TIMES DAILY
Qty: 60 TABLET | Refills: 0 | Status: SHIPPED | OUTPATIENT
Start: 2020-05-28

## 2020-06-19 ENCOUNTER — TELEPHONE (OUTPATIENT)
Dept: INTERNAL MEDICINE | Facility: CLINIC | Age: 35
End: 2020-06-19

## 2020-06-23 ENCOUNTER — VIRTUAL VISIT (OUTPATIENT)
Dept: PRIMARY CARE CLINIC | Age: 35
End: 2020-06-23
Payer: MEDICAID

## 2020-06-23 ENCOUNTER — TELEPHONE (OUTPATIENT)
Dept: INTERNAL MEDICINE | Facility: CLINIC | Age: 35
End: 2020-06-23

## 2020-06-23 PROCEDURE — 99203 OFFICE O/P NEW LOW 30 MIN: CPT | Performed by: NURSE PRACTITIONER

## 2020-06-23 RX ORDER — FAMOTIDINE 40 MG/1
40 TABLET, FILM COATED ORAL 2 TIMES DAILY
Qty: 60 TABLET | Refills: 5 | Status: SHIPPED | OUTPATIENT
Start: 2020-06-23 | End: 2020-10-12 | Stop reason: SDUPTHER

## 2020-06-23 RX ORDER — QUETIAPINE FUMARATE 200 MG/1
200 TABLET, FILM COATED ORAL DAILY
Qty: 30 TABLET | Refills: 11 | Status: SHIPPED | OUTPATIENT
Start: 2020-06-23 | End: 2020-07-13 | Stop reason: SDUPTHER

## 2020-06-23 RX ORDER — HALOPERIDOL 2 MG/1
2 TABLET ORAL 2 TIMES DAILY
Qty: 60 TABLET | Refills: 5 | Status: SHIPPED | OUTPATIENT
Start: 2020-06-23 | End: 2020-06-29 | Stop reason: SDUPTHER

## 2020-06-23 RX ORDER — SUMATRIPTAN 100 MG/1
100 TABLET, FILM COATED ORAL 2 TIMES DAILY PRN
Qty: 9 TABLET | Refills: 5 | Status: SHIPPED | OUTPATIENT
Start: 2020-06-23 | End: 2020-09-11

## 2020-06-23 RX ORDER — IBUPROFEN 800 MG/1
800 TABLET ORAL
Qty: 90 TABLET | Refills: 5 | Status: SHIPPED | OUTPATIENT
Start: 2020-06-23 | End: 2020-10-12 | Stop reason: SDUPTHER

## 2020-06-23 RX ORDER — LAMOTRIGINE 100 MG/1
200 TABLET ORAL 2 TIMES DAILY
Qty: 60 TABLET | Refills: 5 | Status: SHIPPED | OUTPATIENT
Start: 2020-06-23 | End: 2020-07-13 | Stop reason: SDUPTHER

## 2020-06-23 RX ORDER — ESOMEPRAZOLE MAGNESIUM 40 MG/1
40 FOR SUSPENSION ORAL DAILY
Qty: 30 EACH | Refills: 11 | Status: SHIPPED | OUTPATIENT
Start: 2020-06-23 | End: 2020-06-25 | Stop reason: CLARIF

## 2020-06-23 ASSESSMENT — ENCOUNTER SYMPTOMS
SHORTNESS OF BREATH: 0
BACK PAIN: 0
COUGH: 0
TROUBLE SWALLOWING: 0
WHEEZING: 0
SINUS PRESSURE: 0
EYES NEGATIVE: 1

## 2020-06-23 ASSESSMENT — PATIENT HEALTH QUESTIONNAIRE - PHQ9
SUM OF ALL RESPONSES TO PHQ QUESTIONS 1-9: 0
SUM OF ALL RESPONSES TO PHQ9 QUESTIONS 1 & 2: 0
SUM OF ALL RESPONSES TO PHQ QUESTIONS 1-9: 0
2. FEELING DOWN, DEPRESSED OR HOPELESS: 0
1. LITTLE INTEREST OR PLEASURE IN DOING THINGS: 0

## 2020-06-23 NOTE — PROGRESS NOTES
mmol/L    CO2 27 22 - 29 mmol/L    Anion Gap 9 7 - 19 mmol/L    Glucose 99 74 - 109 mg/dL    BUN 14 6 - 20 mg/dL    CREATININE 1.1 0.5 - 1.2 mg/dL    GFR Non-African American >60 >60    Calcium 9.2 8.6 - 10.0 mg/dL    Total Protein 6.6 6.6 - 8.7 g/dL    Alb 4.6 3.5 - 5.2 g/dL    Total Bilirubin 0.3 0.2 - 1.2 mg/dL    Alkaline Phosphatase 84 40 - 130 U/L    ALT 19 5 - 41 U/L    AST 15 5 - 40 U/L   Ethanol   Result Value Ref Range    Ethanol Lvl <10 mg/dL   Urinalysis Reflex to Culture   Result Value Ref Range    Color, UA YELLOW Straw/Yellow    Clarity, UA Clear Clear    Glucose, Ur Negative Negative mg/dL    Bilirubin Urine Negative Negative    Ketones, Urine Negative Negative mg/dL    Specific Gravity, UA 1.030 1.005 - 1.030    Blood, Urine Negative Negative    pH, UA 6.0 5.0 - 8.0    Protein, UA Negative Negative mg/dL    Urobilinogen, Urine 0.2 <2.0 E.U./dL    Nitrite, Urine Negative Negative    Leukocyte Esterase, Urine Negative Negative    Urine Reflex to Culture Not Indicated    Urine Drug Screen   Result Value Ref Range    Amphetamine Screen, Urine Negative Negative <1000 ng/mL    Barbiturate Screen, Ur Negative Negative < 200 ng/mL    Benzodiazepine Screen, Urine Positive (A) Negative <100 ng/mL    Cannabinoid Scrn, Ur Positive (A) Negative <50 ng/mL    Cocaine Metabolite Screen, Urine Negative Negative <300 ng/mL    Opiate Scrn, Ur Positive (A) Negative < 300 ng/mL    Drug Screen Comment: see below    TSH WITH REFLEX TO FT4   Result Value Ref Range    TSH Reflex FT4 1.46 0.35 - 5.50 uIU/mL   Hemoglobin A1C   Result Value Ref Range    Hemoglobin A1C 5.0 4.0 - 6.0 %   Lipid Panel   Result Value Ref Range    Cholesterol, Total 195 160 - 199 mg/dL    Triglycerides 68 0 - 149 mg/dL    HDL 45 (L) 55 - 121 mg/dL    LDL Calculated 136 <100 mg/dL   Vitamin B12   Result Value Ref Range    Vitamin B-12 483 211 - 946 pg/mL   Vitamin D 25 Hydroxy   Result Value Ref Range    Vit D, 25-Hydroxy 16.4 (L) >=30 ng/mL       I have reviewed the following with the Mr. Jovanni Demarco   Lab Review   Admission on 03/14/2020, Discharged on 03/17/2020   Component Date Value    WBC 03/14/2020 8.5     RBC 03/14/2020 4.45*    Hemoglobin 03/14/2020 14.0     Hematocrit 03/14/2020 40.9*    MCV 03/14/2020 91.9     MCH 03/14/2020 31.5*    MCHC 03/14/2020 34.2     RDW 03/14/2020 11.8     Platelets 19/04/8493 299     MPV 03/14/2020 8.9*    Neutrophils % 03/14/2020 70.8*    Lymphocytes % 03/14/2020 20.8     Monocytes % 03/14/2020 5.6     Eosinophils % 03/14/2020 1.8     Basophils % 03/14/2020 0.6     Neutrophils Absolute 03/14/2020 6.0     Immature Granulocytes # 03/14/2020 0.0     Lymphocytes Absolute 03/14/2020 1.8     Monocytes Absolute 03/14/2020 0.50     Eosinophils Absolute 03/14/2020 0.20     Basophils Absolute 03/14/2020 0.10     Sodium 03/14/2020 142     Potassium reflex Magnesi* 03/14/2020 4.3     Chloride 03/14/2020 106     CO2 03/14/2020 27     Anion Gap 03/14/2020 9     Glucose 03/14/2020 99     BUN 03/14/2020 14     CREATININE 03/14/2020 1.1     GFR Non- 03/14/2020 >60     Calcium 03/14/2020 9.2     Total Protein 03/14/2020 6.6     Alb 03/14/2020 4.6     Total Bilirubin 03/14/2020 0.3     Alkaline Phosphatase 03/14/2020 84     ALT 03/14/2020 19     AST 03/14/2020 15     Ethanol Lvl 03/14/2020 <10     Color, UA 03/14/2020 YELLOW     Clarity, UA 03/14/2020 Clear     Glucose, Ur 03/14/2020 Negative     Bilirubin Urine 03/14/2020 Negative     Ketones, Urine 03/14/2020 Negative     Specific Gravity, UA 03/14/2020 1.030     Blood, Urine 03/14/2020 Negative     pH, UA 03/14/2020 6.0     Protein, UA 03/14/2020 Negative     Urobilinogen, Urine 03/14/2020 0.2     Nitrite, Urine 03/14/2020 Negative     Leukocyte Esterase, Urine 03/14/2020 Negative     Urine Reflex to Culture 03/14/2020 Not Indicated     Amphetamine Screen, Urine 03/14/2020 Negative     Barbiturate Screen, Ur 03/14/2020 breath and wheezing. Cardiovascular: Negative for chest pain, palpitations and leg swelling. Genitourinary: Negative for difficulty urinating. Musculoskeletal: Negative for arthralgias and back pain. Skin: Negative for rash. Psychiatric/Behavioral: Positive for agitation (with discussing xanax). Negative for behavioral problems, self-injury, sleep disturbance and suicidal ideas. The patient is nervous/anxious. The patient is not hyperactive. Physical Exam  Constitutional:       Appearance: Normal appearance. Comments: Video     Neurological:      Mental Status: He is alert and oriented to person, place, and time. Psychiatric:         Mood and Affect: Mood normal.         Behavior: Behavior normal.      Comments: aggitated when told wont give xanax           ASSESSMENT/ PLAN    1. Gastroesophageal reflux disease without esophagitis  Cont present   Daniels diet avoid irritants   - esomeprazole Magnesium (NEXIUM) 40 MG PACK; Take 1 packet by mouth daily  Dispense: 30 each; Refill: 11  - famotidine (PEPCID) 40 MG tablet; Take 1 tablet by mouth 2 times daily  Dispense: 60 tablet; Refill: 5  - External Referral To Psychiatry    2. Migraine with status migrainosus, not intractable, unspecified migraine type  Uses as needed  - SUMAtriptan (IMITREX) 100 MG tablet; Take 1 tablet by mouth 2 times daily as needed for Migraine  Dispense: 9 tablet; Refill: 5  - External Referral To Psychiatry    3. Bipolar 1 disorder ([facility]  Multiple issues  Will refer moutain comp if deisres  But discussed I will not use xanax  - QUEtiapine (SEROQUEL) 200 MG tablet; Take 1 tablet by mouth daily  Dispense: 30 tablet; Refill: 11  - lamoTRIgine (LAMICTAL) 100 MG tablet; Take 2 tablets by mouth 2 times daily  Dispense: 60 tablet; Refill: 5  - External Referral To Psychiatry    4. Persistent mood (affective) disorder, unspecified (HCC)    - haloperidol (HALDOL) 2 MG tablet;  Take 1 tablet by mouth 2 times daily  Dispense: 60 tablet; Refill: 5  - QUEtiapine (SEROQUEL) 200 MG tablet; Take 1 tablet by mouth daily  Dispense: 30 tablet; Refill: 11  - lamoTRIgine (LAMICTAL) 100 MG tablet; Take 2 tablets by mouth 2 times daily  Dispense: 60 tablet; Refill: 5  - External Referral To Psychiatry    5. Pain of foot, unspecified laterality  Uses as needed  - ibuprofen (ADVIL;MOTRIN) 800 MG tablet; Take 1 tablet by mouth 3 times daily (with meals)  Dispense: 90 tablet; Refill: 5      Orders Placed This Encounter   Procedures    External Referral To Psychiatry        No follow-ups on file. There are no Patient Instructions on file for this visit. Controlled Substances Monitoring:     Attestation: The Prescription Monitoring Report for this patient was reviewed today. ([id number]) RADHA Vieira)  Periodic Controlled Substance Monitoring: Possible medication side effects, risk of tolerance/dependence & alternative treatments discussed., No signs of potential drug abuse or diversion identified., Obtaining appropriate analgesic effect of treatment. (5/28 xananx 1mg #90) RADHA Vieira)            Additional Instructions: As always, patient is advisedto bring in medication bottles in order to correctly reconcile with our current list.      Do Baltazar received counseling on the following healthy behaviors: none    Patient giveneducational materials on plan of care    I have instructed Do Baltazar to complete a self tracking handout on none and instructed them to bring it with them to his next appointment. Discussed use, benefit, and side effects of prescribed medications. Barriers to medication compliance addressed. All patient questions answered. Pt voiced understanding.      RADHA Vieira

## 2020-06-24 NOTE — TELEPHONE ENCOUNTER
Fax from [employer] that nexium powder is not covered. Will need to use prilosec or famotidine.  ( looks like you already added famotidine)

## 2020-06-29 ENCOUNTER — OFFICE VISIT (OUTPATIENT)
Dept: PRIMARY CARE CLINIC | Age: 35
End: 2020-06-29
Payer: MEDICAID

## 2020-06-29 VITALS
DIASTOLIC BLOOD PRESSURE: 80 MMHG | SYSTOLIC BLOOD PRESSURE: 120 MMHG | OXYGEN SATURATION: 98 % | HEART RATE: 79 BPM | HEIGHT: 67 IN | BODY MASS INDEX: 30.13 KG/M2 | TEMPERATURE: 98.6 F | WEIGHT: 192 LBS

## 2020-06-29 LAB
AMPHETAMINE SCREEN, URINE: NORMAL
BARBITURATE SCREEN, URINE: NORMAL
BENZODIAZEPINE SCREEN, URINE: NORMAL
BUPRENORPHINE URINE: NORMAL
COCAINE METABOLITE SCREEN URINE: NORMAL
GABAPENTIN SCREEN, URINE: NORMAL
MDMA URINE: NORMAL
METHADONE SCREEN, URINE: NORMAL
METHAMPHETAMINE, URINE: NORMAL
OPIATE SCREEN URINE: NORMAL
OXYCODONE SCREEN URINE: NORMAL
PHENCYCLIDINE SCREEN URINE: NORMAL
PROPOXYPHENE SCREEN, URINE: NORMAL
THC SCREEN, URINE: NORMAL
TRICYCLIC ANTIDEPRESSANTS, UR: NORMAL

## 2020-06-29 PROCEDURE — 99214 OFFICE O/P EST MOD 30 MIN: CPT | Performed by: NURSE PRACTITIONER

## 2020-06-29 PROCEDURE — 80305 DRUG TEST PRSMV DIR OPT OBS: CPT | Performed by: NURSE PRACTITIONER

## 2020-06-29 RX ORDER — HALOPERIDOL 2 MG/1
2 TABLET ORAL 2 TIMES DAILY
Qty: 60 TABLET | Refills: 5 | Status: SHIPPED | OUTPATIENT
Start: 2020-06-29 | End: 2020-07-13 | Stop reason: SDUPTHER

## 2020-06-29 RX ORDER — CLONAZEPAM 1 MG/1
1 TABLET ORAL 2 TIMES DAILY PRN
Qty: 28 TABLET | Refills: 0 | Status: SHIPPED | OUTPATIENT
Start: 2020-06-29 | End: 2020-07-13 | Stop reason: SDUPTHER

## 2020-06-29 RX ORDER — ALPRAZOLAM 1 MG/1
1 TABLET ORAL 3 TIMES DAILY PRN
COMMUNITY
Start: 2020-05-28 | End: 2020-06-29 | Stop reason: ALTCHOICE

## 2020-06-29 RX ORDER — SILDENAFIL 100 MG/1
100 TABLET, FILM COATED ORAL DAILY PRN
COMMUNITY
Start: 2020-03-19 | End: 2020-07-13 | Stop reason: SDUPTHER

## 2020-06-29 ASSESSMENT — ENCOUNTER SYMPTOMS
TROUBLE SWALLOWING: 0
COUGH: 0
EYES NEGATIVE: 1
SINUS PRESSURE: 0
WHEEZING: 0
BACK PAIN: 0
SHORTNESS OF BREATH: 0

## 2020-06-29 ASSESSMENT — PATIENT HEALTH QUESTIONNAIRE - PHQ9
SUM OF ALL RESPONSES TO PHQ QUESTIONS 1-9: 0
SUM OF ALL RESPONSES TO PHQ QUESTIONS 1-9: 0
SUM OF ALL RESPONSES TO PHQ9 QUESTIONS 1 & 2: 0
2. FEELING DOWN, DEPRESSED OR HOPELESS: 0
1. LITTLE INTEREST OR PLEASURE IN DOING THINGS: 0

## 2020-06-29 NOTE — PROGRESS NOTES
[address]  [place], [address] [place] [address] Rd  Phone [phone]   Fax [phone]      OFFICE VISIT: 2020    Octavia Rogersha- : 1985      Reason For Visit:  Brett Elliott is a 29 y.o. Raheem Spencer is here for Discuss Medications         Health Maintenance    HPI      Patient is here for issues with moods    He reports that he got mad as she got a text from a friend at 3 am   And it got him mad  And he blew up about it  And so he feeling bad  He just \"off the handle\"    He would like to restart haldol  He was on but had side effects  He feels that haldol works for him  He gets crazy ideas and crazy ideas in his \"head\"  He just gets obessive thoughts    On lamitcal 2 twice a day   Along with seroquel at bedtime    He is taking his med at night  Around 7-8 at night  Gets tired around 9-10 and feels like he can sleep but 12-1 still awake   He just cant stop thinking   He works [employer] and works all day         height is 5' 7\" (1.702 m) and weight is 192 lb (87.1 kg). His temporal temperature is 98.6 °F (37 °C). His blood pressure is 120/80 and his pulse is 79. His oxygen saturation is 98%. Body mass index is 30.07 kg/m².     Results for orders placed or performed in visit on 20   POCT Rapid Drug Screen   Result Value Ref Range    Amphetamine Screen, Urine neg     Barbiturate Screen, Urine neg     Benzodiazepine Screen, Urine neg     Buprenorphine Urine neg     Cocaine Metabolite Screen, Urine neg     Gabapentin Screen, Urine neg     MDMA, Urine neg     Methamphetamine, Urine neg     Methadone Screen, Urine      Opiate Scrn, Ur neg     Oxycodone Screen, Ur neg     PCP Screen, Urine      Propoxyphene Screen, Urine      THC Screen, Urine neg     Tricyclic Antidepressants, Urine         I have reviewed the following with the Mr. Ranjan Díaz   Lab Review   Admission on 2020, Discharged on 2020   Component Date Value    WBC 2020 8.5     RBC 2020 4.45*    Hemoglobin 2020 14.0     03/14/2020 see below     TSH Reflex FT4 03/14/2020 1.46     Hemoglobin A1C 03/14/2020 5.0     Cholesterol, Total 03/14/2020 195     Triglycerides 03/14/2020 68     HDL 03/14/2020 45*    LDL Calculated 03/14/2020 136     Vitamin B-12 03/14/2020 483     Vit D, 25-Hydroxy 03/14/2020 16.4*     Copies of these are in the chart. Prior to Visit Medications    Medication Sig Taking? Authorizing Provider   sildenafil (VIAGRA) 100 MG tablet Take 100 mg by mouth daily as needed Yes Historical Provider, MD   haloperidol (HALDOL) 2 MG tablet Take 1 tablet by mouth 2 times daily Yes RADHA Monsivais   SUMAtriptan (IMITREX) 100 MG tablet Take 1 tablet by mouth 2 times daily as needed for Migraine Yes RADHA Monsivais   QUEtiapine (SEROQUEL) 200 MG tablet Take 1 tablet by mouth daily Yes RADHA Monsivais   lamoTRIgine (LAMICTAL) 100 MG tablet Take 2 tablets by mouth 2 times daily Yes RADHA Monsivais   ibuprofen (ADVIL;MOTRIN) 800 MG tablet Take 1 tablet by mouth 3 times daily (with meals) Yes RADHA Monsivais   famotidine (PEPCID) 40 MG tablet Take 1 tablet by mouth 2 times daily  Patient not taking: Reported on 6/29/2020  RADHA Monsivais       Allergies: Tetracyclines & related    Past Medical History:   Diagnosis Date    Depression        No past surgical history on file. Social History     Tobacco Use    Smoking status: Former Smoker    Smokeless tobacco: Never Used   Substance Use Topics    Alcohol use: Not Currently       Review of Systems   Constitutional: Positive for activity change. Negative for appetite change, fatigue and fever. HENT: Negative for congestion, sinus pressure, tinnitus and trouble swallowing. Eyes: Negative. Respiratory: Negative for cough, shortness of breath and wheezing. Cardiovascular: Negative for chest pain, palpitations and leg swelling. Genitourinary: Negative for difficulty urinating.    Musculoskeletal: Negative for

## 2020-07-13 ENCOUNTER — OFFICE VISIT (OUTPATIENT)
Dept: PRIMARY CARE CLINIC | Age: 35
End: 2020-07-13
Payer: MEDICAID

## 2020-07-13 VITALS
TEMPERATURE: 98.1 F | HEIGHT: 68 IN | WEIGHT: 196.75 LBS | DIASTOLIC BLOOD PRESSURE: 68 MMHG | BODY MASS INDEX: 29.82 KG/M2 | SYSTOLIC BLOOD PRESSURE: 110 MMHG | OXYGEN SATURATION: 96 % | HEART RATE: 86 BPM

## 2020-07-13 PROBLEM — F22 PARANOID BEHAVIOR (HCC): Status: ACTIVE | Noted: 2020-07-13

## 2020-07-13 PROCEDURE — 99213 OFFICE O/P EST LOW 20 MIN: CPT | Performed by: NURSE PRACTITIONER

## 2020-07-13 RX ORDER — QUETIAPINE FUMARATE 200 MG/1
200 TABLET, FILM COATED ORAL DAILY
Qty: 30 TABLET | Refills: 11 | Status: SHIPPED | OUTPATIENT
Start: 2020-07-13 | End: 2020-08-11

## 2020-07-13 RX ORDER — LAMOTRIGINE 100 MG/1
200 TABLET ORAL 2 TIMES DAILY
Qty: 60 TABLET | Refills: 5 | Status: SHIPPED | OUTPATIENT
Start: 2020-07-13 | End: 2020-10-12 | Stop reason: SDUPTHER

## 2020-07-13 RX ORDER — SILDENAFIL 100 MG/1
100 TABLET, FILM COATED ORAL DAILY PRN
Qty: 30 TABLET | Refills: 2 | Status: SHIPPED | OUTPATIENT
Start: 2020-07-13 | End: 2020-08-11 | Stop reason: SDUPTHER

## 2020-07-13 RX ORDER — HALOPERIDOL 2 MG/1
2 TABLET ORAL 2 TIMES DAILY
Qty: 60 TABLET | Refills: 5 | Status: SHIPPED | OUTPATIENT
Start: 2020-07-13 | End: 2020-08-11 | Stop reason: SDUPTHER

## 2020-07-13 RX ORDER — CLONAZEPAM 1 MG/1
1 TABLET ORAL 2 TIMES DAILY PRN
Qty: 60 TABLET | Refills: 0 | Status: SHIPPED | OUTPATIENT
Start: 2020-07-13 | End: 2020-08-11 | Stop reason: SDUPTHER

## 2020-07-13 ASSESSMENT — ENCOUNTER SYMPTOMS
TROUBLE SWALLOWING: 0
EYES NEGATIVE: 1

## 2020-07-13 NOTE — PATIENT INSTRUCTIONS
Patient Education        Bipolar Disorder: Care Instructions  Your Care Instructions     Bipolar disorder is an illness that causes extreme mood changes, from times of very high energy (manic episodes) to times of depression. But many people with bipolar disorder show only the symptoms of depression. These moods may cause problems with your work, school, family life, friendships, and how well you function. This disease is also called manic-depression. There is no cure for bipolar disorder, but it can be helped with medicines. Counseling may also help. It is important to take your medicines exactly as prescribed, even when you feel well. You may need lifelong treatment. Follow-up care is a key part of your treatment and safety. Be sure to make and go to all appointments, and call your doctor if you are having problems. It's also a good idea to know your test results and keep a list of the medicines you take. How can you care for yourself at home? · Be safe with medicines. Take your medicines exactly as prescribed. Do not stop or change a medicine without talking to your doctor first. Chip Bee and your doctor may need to try different combinations of medicines to find what works for you. · Take your medicines on schedule to keep your moods even. When you feel good, you may think that you do not need your medicines. But it is important to keep taking them. · Go to your counseling sessions. Call and talk with your counselor if you can't go to a session or if you don't think the sessions are helping. Do not just stop going. · Get at least 30 minutes of activity on most days of the week. Walking is a good choice. You also may want to do other things, such as running, swimming, or cycling. · Get enough sleep. Keep your room dark and quiet. Try to go to bed at the same time every night. · Eat a healthy diet. This includes whole grains, dairy, fruits, vegetables, and protein.  Eat foods from each of these else.  · Someone who has bipolar disorder displays dangerous behavior, and you think the person might hurt himself or herself or someone else. Call your doctor now or seek immediate medical care if:  · You hear voices. · Someone you know has bipolar disorder and talks about suicide. Keep the numbers for these national suicide hotlines: 6-932-190-TALK (5-568.215.7021) and 1-409-VMDOLUN (2-560.709.9096). If a suicide threat seems real, with a specific plan and a way to carry it out, stay with the person, or ask someone you trust to stay with the person, until you can get help. · Someone you know has bipolar disorder and:  ? Starts to give away possessions. ? Is using illegal drugs or drinking alcohol heavily. ? Talks or writes about death, including writing suicide notes or talking about guns, knives, or pills. ? Talks or writes about hurting someone else. ? Starts to spend a lot of time alone. ? Acts very aggressively or suddenly appears calm. ? Talks about beliefs that are not based in reality (delusions). Watch closely for changes in your health, and be sure to contact your doctor if:  · You cannot go to your counseling sessions. Where can you learn more? Go to [email or web address]://OnForce.[email or web address] org and sign in to your [employer] account. Enter K052 in the [facility] box to learn more about \"Bipolar Disorder: Care Instructions. \"     If you do not have an account, please click on the \"Sign Up Now\" link. Current as of: January 31, 2020               Content Version: 12.5  © 1911-8995 Healthwise, Incorporated. Care instructions adapted under license by [facility] ([facility]). If you have questions about a medical condition or this instruction, always ask your healthcare professional. [place] [address] any warranty or liability for your use of this information. Patient Education        Learning About Mood Disorders  What are mood disorders?      Mood disorders are medical problems that affect how you feel. They can impact your moods, thoughts, and actions. Mood disorders include:  · Depression. This causes you to feel sad or hopeless for much of the time. · Bipolar disorder. This causes extreme mood changes from manic episodes of very high energy to extreme lows of depression. · Seasonal affective disorder (SAD). This is a type of depression that affects you during the same season each year. Most often people experience SAD during the fall and winter months when days are shorter and there is less light. What are the symptoms? Depression  You may:  · Feel sad or hopeless nearly every day. · Lose interest in or not get pleasure from most daily activities. You feel this way nearly every day. · Have low energy, changes in your appetite, or changes in how well you sleep. · Have trouble concentrating. · Think about death and suicide. Keep the numbers for these national suicide hotlines: 8-414-683-TALK (8-385.679.3544) and 0-730-OWJZTSS (8-309.256.6985). If you or someone you know talks about suicide or feeling hopeless, get help right away. Bipolar disorder  Symptoms depend on your mood swings. You may:  · Feel very happy, energetic, or on edge. · Feel like you need very little sleep. · Feel overly self-confident. · Do impulsive things, such as spending a lot of money. · Feel sad or hopeless. · Have racing thoughts or trouble thinking and making decisions. · Lose interest in things you have enjoyed in the past.  · Think about death and suicide. Keep the numbers for these national suicide hotlines: 5-147-278-TALK (5-641.943.9066) and 7-016-PZMRVMG (0-329.734.3145). If you or someone you know talks about suicide or feeling hopeless, get help right away. Seasonal affective disorder (SAD)  Symptoms come and go at about the same time each year. For most people with SAD, symptoms come during the winter when there is less daylight.  You may:  · Feel sad, grumpy, morgan, or anxious. · Lose interest in your usual activities. · Eat more and crave carbohydrates, such as bread and pasta. · Gain weight. · Sleep more and feel drowsy during the daytime. How are mood disorders treated? Mood disorders can be treated with medicines or counseling, or a combination of both. Medicines for depression and SAD may include antidepressants. Medicines for bipolar disorder may include:  · Mood stabilizers. · Antipsychotics. · Benzodiazepines. Counseling may involve cognitive-behavioral therapy. It teaches you how to change the ways you think and behave. This can help you stop thinking bad thoughts about yourself and your life. Light therapy is the main treatment for SAD. This therapy uses a special kind of lamp. You let the lamp shine on you at certain times, usually in the morning. This may help your symptoms during the months when there is less sunlight. Healthy lifestyle  Healthy lifestyle changes may help you feel better. · Be active often. You might try walking or strength training. · Eat a healthy diet. Include fruits, vegetables, lean proteins, and whole grains in your diet each day. · Keep a regular sleep schedule. Try for 8 hours of sleep a night. · Find ways to manage stress, such as relaxation exercises. · Avoid alcohol and illegal drugs. Follow-up care is a key part of your treatment and safety. Be sure to make and go to all appointments, and call your doctor if you are having problems. It's also a good idea to know your test results and keep a list of the medicines you take. Where can you learn more? Go to [email or web address] and sign in to your [employer] account. Enter V760 in the [facility] box to learn more about \"Learning About Mood Disorders. \"     If you do not have an account, please click on the \"Sign Up Now\" link. Current as of: January 31, 2020               Content Version: 12.5  © 1043-3133 Healthwise, [facility].    Care instructions adapted under license by [facility] ([facility]). If you have questions about a medical condition or this instruction, always ask your healthcare professional. [address] any warranty or liability for your use of this information.

## 2020-07-13 NOTE — PROGRESS NOTES
[address]  [place], [address]  Phone [phone]   Fax [phone]      OFFICE VISIT: 2020    Marisela Hicks- : 1985      Reason For Visit:  Gabi Brown is a 29 y.o. Jamia Lubin is here for Follow-up (for depression)         Health Maintenance     HPI    Patient here for mood issues   He wanted to restart haldol as he had great results with this before    He has been taking the haldol 2mg twice a day  He repots he feels better  He isn't overthink the situation \"it is what it is\"  His girlfriend argue and he ignores it  Just the normal relationship    He reports he has been sleeping some  Went to sleep around 2 am  And woke up late  He has not been sleeping as much   He fights his seroquel  He reports  He reports at times at takes a little while to kick in     He reports side effects  He doesn't want to have sex with his wife  He just no desire  He is anxious at times      Weight up 4 lbs since last visit  He works every day  If not raining         height is 5' 8\" (1.727 m) and weight is 196 lb 12 oz (89.2 kg). His temporal temperature is 98.1 °F (36.7 °C). His blood pressure is 110/68 and his pulse is 86. His oxygen saturation is 96%. Body mass index is 29.92 kg/m².     Results for orders placed or performed in visit on 20   POCT Rapid Drug Screen   Result Value Ref Range    Amphetamine Screen, Urine neg     Barbiturate Screen, Urine neg     Benzodiazepine Screen, Urine neg     Buprenorphine Urine neg     Cocaine Metabolite Screen, Urine neg     Gabapentin Screen, Urine neg     MDMA, Urine neg     Methamphetamine, Urine neg     Methadone Screen, Urine      Opiate Scrn, Ur neg     Oxycodone Screen, Ur neg     PCP Screen, Urine      Propoxyphene Screen, Urine      THC Screen, Urine neg     Tricyclic Antidepressants, Urine         I have reviewed the following with the Mr. Audra Thomas   Lab Review   Office Visit on 2020   Component Date Value    Amphetamine Screen, Urine 2020 neg  Barbiturate Screen, Urine 06/29/2020 neg     Benzodiazepine Screen, U* 06/29/2020 neg     Buprenorphine Urine 06/29/2020 neg     Cocaine Metabolite Scree* 06/29/2020 neg     Gabapentin Screen, Urine 06/29/2020 neg     MDMA, Urine 06/29/2020 neg     Methamphetamine, Urine 06/29/2020 neg     Opiate Scrn, Ur 06/29/2020 neg     Oxycodone Screen, Ur 06/29/2020 neg     THC Screen, Urine 06/29/2020 neg    Admission on 03/14/2020, Discharged on 03/17/2020   Component Date Value    WBC 03/14/2020 8.5     RBC 03/14/2020 4.45*    Hemoglobin 03/14/2020 14.0     Hematocrit 03/14/2020 40.9*    MCV 03/14/2020 91.9     MCH 03/14/2020 31.5*    MCHC 03/14/2020 34.2     RDW 03/14/2020 11.8     Platelets 99/50/0512 299     MPV 03/14/2020 8.9*    Neutrophils % 03/14/2020 70.8*    Lymphocytes % 03/14/2020 20.8     Monocytes % 03/14/2020 5.6     Eosinophils % 03/14/2020 1.8     Basophils % 03/14/2020 0.6     Neutrophils Absolute 03/14/2020 6.0     Immature Granulocytes # 03/14/2020 0.0     Lymphocytes Absolute 03/14/2020 1.8     Monocytes Absolute 03/14/2020 0.50     Eosinophils Absolute 03/14/2020 0.20     Basophils Absolute 03/14/2020 0.10     Sodium 03/14/2020 142     Potassium reflex Magnesi* 03/14/2020 4.3     Chloride 03/14/2020 106     CO2 03/14/2020 27     Anion Gap 03/14/2020 9     Glucose 03/14/2020 99     BUN 03/14/2020 14     CREATININE 03/14/2020 1.1     GFR Non- 03/14/2020 >60     Calcium 03/14/2020 9.2     Total Protein 03/14/2020 6.6     Alb 03/14/2020 4.6     Total Bilirubin 03/14/2020 0.3     Alkaline Phosphatase 03/14/2020 84     ALT 03/14/2020 19     AST 03/14/2020 15     Ethanol Lvl 03/14/2020 <10     Color, UA 03/14/2020 YELLOW     Clarity, UA 03/14/2020 Clear     Glucose, Ur 03/14/2020 Negative     Bilirubin Urine 03/14/2020 Negative     Ketones, Urine 03/14/2020 Negative     Specific Gravity, UA 03/14/2020 1.030     Blood, Urine 03/14/2020 RADHA Bryant       Allergies: Tetracyclines & related    Past Medical History:   Diagnosis Date    Depression        History reviewed. No pertinent surgical history. Social History     Tobacco Use    Smoking status: Former Smoker    Smokeless tobacco: Never Used   Substance Use Topics    Alcohol use: Not Currently       Review of Systems   Constitutional: Positive for activity change. Negative for appetite change, fatigue and fever. HENT: Negative for congestion, postnasal drip and trouble swallowing. Eyes: Negative. Physical Exam  Constitutional:       General: He is not in acute distress. Appearance: Normal appearance. He is not ill-appearing. HENT:      Head: Normocephalic. Right Ear: Tympanic membrane normal.      Left Ear: Tympanic membrane normal.   Cardiovascular:      Rate and Rhythm: Normal rate and regular rhythm. Pulses: Normal pulses. Heart sounds: No murmur. Neurological:      General: No focal deficit present. Mental Status: He is alert and oriented to person, place, and time. Psychiatric:         Mood and Affect: Mood normal.         Behavior: Behavior normal.      Comments: Calm             ASSESSMENT/ PLAN    1. Drug-induced erectile dysfunction  Will cont present  As needed  - sildenafil (VIAGRA) 100 MG tablet; Take 1 tablet by mouth daily as needed for Erectile Dysfunction  Dispense: 30 tablet; Refill: 2    2. Persistent mood (affective) disorder, unspecified (HCC)  Cont regimen working  And will adjust if needed  Still paranoid   He gets this way  - haloperidol (HALDOL) 2 MG tablet; Take 1 tablet by mouth 2 times daily  Dispense: 60 tablet; Refill: 5  - QUEtiapine (SEROQUEL) 200 MG tablet; Take 1 tablet by mouth daily  Dispense: 30 tablet; Refill: 11  - lamoTRIgine (LAMICTAL) 100 MG tablet; Take 2 tablets by mouth 2 times daily  Dispense: 60 tablet; Refill: 5  - clonazePAM (KLONOPIN) 1 MG tablet;  Take 1 tablet by mouth 2 times daily as need to try different combinations of medicines to find what works for you. · Take your medicines on schedule to keep your moods even. When you feel good, you may think that you do not need your medicines. But it is important to keep taking them. · Go to your counseling sessions. Call and talk with your counselor if you can't go to a session or if you don't think the sessions are helping. Do not just stop going. · Get at least 30 minutes of activity on most days of the week. Walking is a good choice. You also may want to do other things, such as running, swimming, or cycling. · Get enough sleep. Keep your room dark and quiet. Try to go to bed at the same time every night. · Eat a healthy diet. This includes whole grains, dairy, fruits, vegetables, and protein. Eat foods from each of these groups. · Try to lower your stress. Manage your time, build a strong support system, and lead a healthy lifestyle. To lower your stress, try physical activity, slow deep breathing, or getting a massage. · Do not use alcohol or illegal drugs. · Learn the early signs of your mood changes. You can then take steps to help yourself feel better. · Ask for help from friends and family when you need it. You may need help with daily chores when you are depressed. When you are manic, you may need support to control your high energy levels. What should you do if someone in your family has bipolar disorder? · Learn about the disease and the signs that it is getting worse. · Remind your family member that you love him or her. · Make a plan with all family members about how to take care of your loved one when his or her symptoms are bad. · Talk about your fears and concerns and those of other family members. Seek counseling if needed. · Do not focus attention only on the person who is in treatment. · Remind yourself that it will take time for changes to occur. · Do not blame yourself for the disease.   · Know your legal rights counseling sessions. Where can you learn more? Go to [email or web address]://[email or web address] org and sign in to your [employer] account. Enter K052 in the [facility] box to learn more about \"Bipolar Disorder: Care Instructions. \"     If you do not have an account, please click on the \"Sign Up Now\" link. Current as of: January 31, 2020               Content Version: 12.5  © 2006-2020 [employer]. Care instructions adapted under license by [facility] ([facility]). If you have questions about a medical condition or this instruction, always ask your healthcare professional. [place] [address] any warranty or liability for your use of this information. Patient Education        Learning About Mood Disorders  What are mood disorders? Mood disorders are medical problems that affect how you feel. They can impact your moods, thoughts, and actions. Mood disorders include:  · Depression. This causes you to feel sad or hopeless for much of the time. · Bipolar disorder. This causes extreme mood changes from manic episodes of very high energy to extreme lows of depression. · Seasonal affective disorder (SAD). This is a type of depression that affects you during the same season each year. Most often people experience SAD during the fall and winter months when days are shorter and there is less light. What are the symptoms? Depression  You may:  · Feel sad or hopeless nearly every day. · Lose interest in or not get pleasure from most daily activities. You feel this way nearly every day. · Have low energy, changes in your appetite, or changes in how well you sleep. · Have trouble concentrating. · Think about death and suicide. Keep the numbers for these national suicide hotlines: 9-531-357-TALK (5-224.547.4268) and 3-705-JHFADDE (9-122.140.9030). If you or someone you know talks about suicide or feeling hopeless, get help right away.   Bipolar disorder  Symptoms depend on your mood swings. You may:  · Feel very happy, energetic, or on edge. · Feel like you need very little sleep. · Feel overly self-confident. · Do impulsive things, such as spending a lot of money. · Feel sad or hopeless. · Have racing thoughts or trouble thinking and making decisions. · Lose interest in things you have enjoyed in the past.  · Think about death and suicide. Keep the numbers for these national suicide hotlines: 5-399-717-TALK (1-479.652.5509) and 7-994-ZWCEYDU (8-805.624.5892). If you or someone you know talks about suicide or feeling hopeless, get help right away. Seasonal affective disorder (SAD)  Symptoms come and go at about the same time each year. For most people with SAD, symptoms come during the winter when there is less daylight. You may:  · Feel sad, grumpy, morgan, or anxious. · Lose interest in your usual activities. · Eat more and crave carbohydrates, such as bread and pasta. · Gain weight. · Sleep more and feel drowsy during the daytime. How are mood disorders treated? Mood disorders can be treated with medicines or counseling, or a combination of both. Medicines for depression and SAD may include antidepressants. Medicines for bipolar disorder may include:  · Mood stabilizers. · Antipsychotics. · Benzodiazepines. Counseling may involve cognitive-behavioral therapy. It teaches you how to change the ways you think and behave. This can help you stop thinking bad thoughts about yourself and your life. Light therapy is the main treatment for SAD. This therapy uses a special kind of lamp. You let the lamp shine on you at certain times, usually in the morning. This may help your symptoms during the months when there is less sunlight. Healthy lifestyle  Healthy lifestyle changes may help you feel better. · Be active often. You might try walking or strength training. · Eat a healthy diet. Include fruits, vegetables, lean proteins, and whole grains in your diet each day.   · Keep a regular sleep schedule. Try for 8 hours of sleep a night. · Find ways to manage stress, such as relaxation exercises. · Avoid alcohol and illegal drugs. Follow-up care is a key part of your treatment and safety. Be sure to make and go to all appointments, and call your doctor if you are having problems. It's also a good idea to know your test results and keep a list of the medicines you take. Where can you learn more? Go to https://XunleipeNirvanix.Personify [email or web address] org and sign in to your [employer] account. Enter M786 in the [facility] box to learn more about \"Learning About Mood Disorders. \"     If you do not have an account, please click on the \"Sign Up Now\" link. Current as of: January 31, 2020               Content Version: 12.5  © 9013-0477 Healthwise, Incorporated. Care instructions adapted under license by [facility] ([facility]). If you have questions about a medical condition or this instruction, always ask your healthcare professional. [place] [address] any warranty or liability for your use of this information. Controlled Substances Monitoring: Additional Instructions: As always, patient is advisedto bring in medication bottles in order to correctly reconcile with our current list.      Marcia De Luna received counseling on the following healthy behaviors: no ne    Patient giveneducational materials on plan of care    I have instructed Marcia De Luna to complete a self tracking handout on blood pressure and instructed them to bring it with them to his next appointment. Discussed use, benefit, and side effects of prescribed medications. Barriers to medication compliance addressed. All patient questions answered. Pt voiced understanding.      RADHA Blackwell

## 2020-08-11 ENCOUNTER — OFFICE VISIT (OUTPATIENT)
Dept: PRIMARY CARE CLINIC | Age: 35
End: 2020-08-11
Payer: MEDICAID

## 2020-08-11 VITALS
DIASTOLIC BLOOD PRESSURE: 82 MMHG | HEART RATE: 61 BPM | TEMPERATURE: 98.4 F | WEIGHT: 196.75 LBS | OXYGEN SATURATION: 96 % | SYSTOLIC BLOOD PRESSURE: 122 MMHG | BODY MASS INDEX: 29.92 KG/M2

## 2020-08-11 PROCEDURE — 99214 OFFICE O/P EST MOD 30 MIN: CPT | Performed by: NURSE PRACTITIONER

## 2020-08-11 RX ORDER — SILDENAFIL 100 MG/1
100 TABLET, FILM COATED ORAL DAILY PRN
Qty: 30 TABLET | Refills: 2 | Status: SHIPPED | OUTPATIENT
Start: 2020-08-11

## 2020-08-11 RX ORDER — HALOPERIDOL 2 MG/1
2 TABLET ORAL 3 TIMES DAILY
Qty: 90 TABLET | Refills: 5 | Status: SHIPPED | OUTPATIENT
Start: 2020-08-11 | End: 2020-10-12 | Stop reason: SDUPTHER

## 2020-08-11 RX ORDER — HALOPERIDOL 2 MG/1
2 TABLET ORAL 2 TIMES DAILY
Qty: 60 TABLET | Refills: 5 | Status: SHIPPED | OUTPATIENT
Start: 2020-08-11 | End: 2020-08-11

## 2020-08-11 RX ORDER — QUETIAPINE FUMARATE 300 MG/1
300 TABLET, FILM COATED ORAL NIGHTLY
Qty: 30 TABLET | Refills: 5 | Status: SHIPPED | OUTPATIENT
Start: 2020-08-11 | End: 2020-10-12 | Stop reason: SDUPTHER

## 2020-08-11 RX ORDER — CLONAZEPAM 1 MG/1
1 TABLET ORAL 2 TIMES DAILY PRN
Qty: 60 TABLET | Refills: 0 | Status: SHIPPED | OUTPATIENT
Start: 2020-08-11 | End: 2020-09-11 | Stop reason: SDUPTHER

## 2020-08-11 ASSESSMENT — ENCOUNTER SYMPTOMS
EYES NEGATIVE: 1
TROUBLE SWALLOWING: 0

## 2020-08-11 NOTE — PATIENT INSTRUCTIONS
Patient Education        Bipolar Disorder: Care Instructions  Your Care Instructions     Bipolar disorder is an illness that causes extreme mood changes, from times of very high energy (manic episodes) to times of depression. But many people with bipolar disorder show only the symptoms of depression. These moods may cause problems with your work, school, family life, friendships, and how well you function. This disease is also called manic-depression. There is no cure for bipolar disorder, but it can be helped with medicines. Counseling may also help. It is important to take your medicines exactly as prescribed, even when you feel well. You may need lifelong treatment. Follow-up care is a key part of your treatment and safety. Be sure to make and go to all appointments, and call your doctor if you are having problems. It's also a good idea to know your test results and keep a list of the medicines you take. How can you care for yourself at home? · Be safe with medicines. Take your medicines exactly as prescribed. Do not stop or change a medicine without talking to your doctor first. Macy Willis and your doctor may need to try different combinations of medicines to find what works for you. · Take your medicines on schedule to keep your moods even. When you feel good, you may think that you do not need your medicines. But it is important to keep taking them. · Go to your counseling sessions. Call and talk with your counselor if you can't go to a session or if you don't think the sessions are helping. Do not just stop going. · Get at least 30 minutes of activity on most days of the week. Walking is a good choice. You also may want to do other things, such as running, swimming, or cycling. · Get enough sleep. Keep your room dark and quiet. Try to go to bed at the same time every night. · Eat a healthy diet. This includes whole grains, dairy, fruits, vegetables, and protein.  Eat foods from each of these groups. · Try to lower your stress. Manage your time, build a strong support system, and lead a healthy lifestyle. To lower your stress, try physical activity, slow deep breathing, or getting a massage. · Do not use alcohol or illegal drugs. · Learn the early signs of your mood changes. You can then take steps to help yourself feel better. · Ask for help from friends and family when you need it. You may need help with daily chores when you are depressed. When you are manic, you may need support to control your high energy levels. What should you do if someone in your family has bipolar disorder? · Learn about the disease and the signs that it is getting worse. · Remind your family member that you love him or her. · Make a plan with all family members about how to take care of your loved one when his or her symptoms are bad. · Talk about your fears and concerns and those of other family members. Seek counseling if needed. · Do not focus attention only on the person who is in treatment. · Remind yourself that it will take time for changes to occur. · Do not blame yourself for the disease. · Know your legal rights and the legal rights of your family member. Support groups or counselors can help you with this information. · Take care of yourself. Keep up with your own interests, such as your career, hobbies, and friends. Use exercise, positive self-talk, deep breathing, and other relaxing exercises to help lower your stress. · Give yourself time to grieve. You may need to deal with emotions such as anger, fear, and frustration. After you work through your feelings, you will be better able to care for yourself and your family. · If you are having a hard time with your feelings or with your relationship with your family member, talk with a counselor. When should you call for help? [id number] anytime you think you may need emergency care.  For example, call if:  · You feel like hurting yourself or someone else.  · Someone who has bipolar disorder displays dangerous behavior, and you think the person might hurt himself or herself or someone else. Call your doctor now or seek immediate medical care if:  · You hear voices. · Someone you know has bipolar disorder and talks about suicide. Keep the numbers for these national suicide hotlines: 2-764-410-TALK (1-319.717.9720) and 4-954-TJRYGEK (0-559.721.3832). If a suicide threat seems real, with a specific plan and a way to carry it out, stay with the person, or ask someone you trust to stay with the person, until you can get help. · Someone you know has bipolar disorder and:  ? Starts to give away possessions. ? Is using illegal drugs or drinking alcohol heavily. ? Talks or writes about death, including writing suicide notes or talking about guns, knives, or pills. ? Talks or writes about hurting someone else. ? Starts to spend a lot of time alone. ? Acts very aggressively or suddenly appears calm. ? Talks about beliefs that are not based in reality (delusions). Watch closely for changes in your health, and be sure to contact your doctor if:  · You cannot go to your counseling sessions. Where can you learn more? Go to [email or web address]://Paylocity.[employer]k [email or web address] org and sign in to your [employer] account. Enter K052 in the [facility] box to learn more about \"Bipolar Disorder: Care Instructions. \"     If you do not have an account, please click on the \"Sign Up Now\" link. Current as of: January 31, 2020               Content Version: 12.5  © 0547-9927 Healthwise, Incorporated. Care instructions adapted under license by [address] If you have questions about a medical condition or this instruction, always ask your healthcare professional. [place] [address] any warranty or liability for your use of this information.          Patient Education        Erectile Dysfunction: Care Instructions  Your Care Instructions     A man has erectile dysfunction (ED) when he routinely can't get or keep an erection that allows satisfactory sex. He may not be able to have an erection at any time. Or he may not be able to have one that is firm enough or lasts long enough to complete intercourse. ED is not the same as having trouble getting an erection now and then. That's common. It happens to most men at some time. ED can be caused by problems with the blood vessels, nerves, or hormones. It can be caused by diabetes, heart disease, and injuries. Nerve disorders, such as multiple sclerosis or Parkinson's disease, can also cause it. ED can also be caused by medicines, alcohol, and tobacco. Or it may be caused by depression, stress, grief, or relationship problems. Follow-up care is a key part of your treatment and safety. Be sure to make and go to all appointments, and call your doctor if you are having problems. It's also a good idea to know your test results and keep a list of the medicines you take. How can you care for yourself at home? Lifestyle  · Limit alcohol. Have no more than 2 drinks a day. · Do not smoke. Smoking makes it harder for the blood vessels in the penis to relax and let blood flow in. If you need help quitting, talk to your doctor about stop-smoking programs and medicines. These can increase your chances of quitting for good. · Do not use cocaine, heroin, or other illegal drugs. · Try to reduce stress. · Give yourself time to adjust to change. Changes in your job, family, relationships, home life, and other areas can cause stress. And stress can cause erection problems. Work with your partner  · Don't assume that you know what your partner likes when it comes to sex. You may be wrong. Talk about what each of you does and does not enjoy. · Make time outside of the bedroom to talk about your sex life. If you avoid sex because you are afraid of having erection problems, your partner may worry that you are no longer interested.   · If you and your partner have trouble talking about sex, see a therapist who can help you talk about it. Reading books with your partner about sexual health may also help. · Relax. Take time for more foreplay. Worrying about your erections may only make things worse. Medicines  · Tell your doctor about all the medicines that you take. ? Some medicines can cause erection problems. ? Some medicines can have dangerous interactions with medicines that are prescribed for ED, including over-the-counter medicines and herbal products. · Be safe with medicines. Take your medicines exactly as prescribed. Call your doctor if you think you are having a problem with your medicine. · Talk to your doctor about trying a medicine to help you keep an erection. This could be a medicine such as Viagra, Levitra, or Cialis. If you have a heart problem, ask your doctor if these are safe for you. Do not take these medicines if you take nitroglycerin or other nitrate medicine. When should you call for help? Call your doctor now or seek immediate medical care if:  · You took a medicine for erectile dysfunction and you have an erection that lasts longer than 3 hours. Watch closely for changes in your health, and be sure to contact your doctor if you have any problems. Where can you learn more? Go to https://VaccsyspeBuildingeyeeb.[email or web address] org and sign in to your [employer] account. Enter [id number] in the [employer] box to learn more about \"Erectile Dysfunction: Care Instructions. \"     If you do not have an account, please click on the \"Sign Up Now\" link. Current as of: February 11, 2020               Content Version: 12.5  © 2006-2020 Healthwise, Incorporated. Care instructions adapted under license by [facility] ([facility]). If you have questions about a medical condition or this instruction, always ask your healthcare professional. [place] [address] any warranty or liability for your use of this information. Patient Education        Learning About Mood Disorders  What are mood disorders? Mood disorders are medical problems that affect how you feel. They can impact your moods, thoughts, and actions. Mood disorders include:  · Depression. This causes you to feel sad or hopeless for much of the time. · Bipolar disorder. This causes extreme mood changes from manic episodes of very high energy to extreme lows of depression. · Seasonal affective disorder (SAD). This is a type of depression that affects you during the same season each year. Most often people experience SAD during the fall and winter months when days are shorter and there is less light. What are the symptoms? Depression  You may:  · Feel sad or hopeless nearly every day. · Lose interest in or not get pleasure from most daily activities. You feel this way nearly every day. · Have low energy, changes in your appetite, or changes in how well you sleep. · Have trouble concentrating. · Think about death and suicide. Keep the numbers for these national suicide hotlines: 4-584-426-TALK (4-744.598.5637) and 1-380-FGRVNWB (0-353.600.9393). If you or someone you know talks about suicide or feeling hopeless, get help right away. Bipolar disorder  Symptoms depend on your mood swings. You may:  · Feel very happy, energetic, or on edge. · Feel like you need very little sleep. · Feel overly self-confident. · Do impulsive things, such as spending a lot of money. · Feel sad or hopeless. · Have racing thoughts or trouble thinking and making decisions. · Lose interest in things you have enjoyed in the past.  · Think about death and suicide. Keep the numbers for these national suicide hotlines: 0-364-920-TALK (2-356-273-472-142-3715) and 6-591-YADSZPB (0-631.601.1522). If you or someone you know talks about suicide or feeling hopeless, get help right away. Seasonal affective disorder (SAD)  Symptoms come and go at about the same time each year.  For most people with SAD, symptoms come during the winter when there is less daylight. You may:  · Feel sad, grumpy, morgan, or anxious. · Lose interest in your usual activities. · Eat more and crave carbohydrates, such as bread and pasta. · Gain weight. · Sleep more and feel drowsy during the daytime. How are mood disorders treated? Mood disorders can be treated with medicines or counseling, or a combination of both. Medicines for depression and SAD may include antidepressants. Medicines for bipolar disorder may include:  · Mood stabilizers. · Antipsychotics. · Benzodiazepines. Counseling may involve cognitive-behavioral therapy. It teaches you how to change the ways you think and behave. This can help you stop thinking bad thoughts about yourself and your life. Light therapy is the main treatment for SAD. This therapy uses a special kind of lamp. You let the lamp shine on you at certain times, usually in the morning. This may help your symptoms during the months when there is less sunlight. Healthy lifestyle  Healthy lifestyle changes may help you feel better. · Be active often. You might try walking or strength training. · Eat a healthy diet. Include fruits, vegetables, lean proteins, and whole grains in your diet each day. · Keep a regular sleep schedule. Try for 8 hours of sleep a night. · Find ways to manage stress, such as relaxation exercises. · Avoid alcohol and illegal drugs. Follow-up care is a key part of your treatment and safety. Be sure to make and go to all appointments, and call your doctor if you are having problems. It's also a good idea to know your test results and keep a list of the medicines you take. Where can you learn more? Go to https://mayuri.Trunity. org and sign in to your [employer] account. Enter G889 in the [facility] box to learn more about \"Learning About Mood Disorders. \"     If you do not have an account, please click on the \"Sign Up Now\" link.   Current as of: January 31, 2020               Content Version: 12.5  © 3659-6576 Healthwise, Incorporated. Care instructions adapted under license by [facility] ([facility]). If you have questions about a medical condition or this instruction, always ask your healthcare professional. [address] any warranty or liability for your use of this information.

## 2020-08-11 NOTE — PROGRESS NOTES
[address]  [place], [address]  Phone [phone]   Fax [phone]      OFFICE VISIT: 2020    Maia Roberts- : 1985      Reason For Visit:  Molina Nice is a 29 y.o. [place] Re[place] is here for Anxiety (and paranoid behavior)         Health Maintenance     HPI    Patient here for 1 month follow up on mood issues    At last visit was still having some paranoid behaviors  So adjusted haldol to twice a day  He reports that he has been \"went off on his boss and got fired\"  But got a new job the same day   But reports his boss grandma was in hospital and was being a jerk   And so got upset   But he blew up on him  He has been taking the klonipin for moods  Is taking first in a to get able to work around people but adjusting and other in evening      insomnia  Taking seroquel 200mg   He reports that so so   He has taken the seroquel at night  He has managed around 5 hours  He doesn't feel drowsy on it in past      Mood stabilizer  lamictal 2 tablets twice a day  Rare klonipin as we wont use short acting    Haldol 2mg twice a day  He reports he decided to take twice a day  It does help  But notes that side effects  That require viagra         weight is 196 lb 12 oz (89.2 kg). His temporal temperature is 98.4 °F (36.9 °C). His blood pressure is 122/82 and his pulse is 61. His oxygen saturation is 96%. Body mass index is 29.92 kg/m².     Results for orders placed or performed in visit on 20   POCT Rapid Drug Screen   Result Value Ref Range    Amphetamine Screen, Urine neg     Barbiturate Screen, Urine neg     Benzodiazepine Screen, Urine neg     Buprenorphine Urine neg     Cocaine Metabolite Screen, Urine neg     Gabapentin Screen, Urine neg     MDMA, Urine neg     Methamphetamine, Urine neg     Methadone Screen, Urine      Opiate Scrn, Ur neg     Oxycodone Screen, Ur neg     PCP Screen, Urine      Propoxyphene Screen, Urine      THC Screen, Urine neg     Tricyclic Antidepressants, Urine         I have reviewed the following with the Mr. Guillermo Martinez Visit on 06/29/2020   Component Date Value    Amphetamine Screen, Urine 06/29/2020 neg     Barbiturate Screen, Urine 06/29/2020 neg     Benzodiazepine Screen, U* 06/29/2020 neg     Buprenorphine Urine 06/29/2020 neg     Cocaine Metabolite Scree* 06/29/2020 neg     Gabapentin Screen, Urine 06/29/2020 neg     MDMA, Urine 06/29/2020 neg     Methamphetamine, Urine 06/29/2020 neg     Opiate Scrn, Ur 06/29/2020 neg     Oxycodone Screen, Ur 06/29/2020 neg     THC Screen, Urine 06/29/2020 neg    Admission on 03/14/2020, Discharged on 03/17/2020   Component Date Value    WBC 03/14/2020 8.5     RBC 03/14/2020 4.45*    Hemoglobin 03/14/2020 14.0     Hematocrit 03/14/2020 40.9*    MCV 03/14/2020 91.9     MCH 03/14/2020 31.5*    MCHC 03/14/2020 34.2     RDW 03/14/2020 11.8     Platelets 46/63/2774 299     MPV 03/14/2020 8.9*    Neutrophils % 03/14/2020 70.8*    Lymphocytes % 03/14/2020 20.8     Monocytes % 03/14/2020 5.6     Eosinophils % 03/14/2020 1.8     Basophils % 03/14/2020 0.6     Neutrophils Absolute 03/14/2020 6.0     Immature Granulocytes # 03/14/2020 0.0     Lymphocytes Absolute 03/14/2020 1.8     Monocytes Absolute 03/14/2020 0.50     Eosinophils Absolute 03/14/2020 0.20     Basophils Absolute 03/14/2020 0.10     Sodium 03/14/2020 142     Potassium reflex Magnesi* 03/14/2020 4.3     Chloride 03/14/2020 106     CO2 03/14/2020 27     Anion Gap 03/14/2020 9     Glucose 03/14/2020 99     BUN 03/14/2020 14     CREATININE 03/14/2020 1.1     GFR Non- 03/14/2020 >60     Calcium 03/14/2020 9.2     Total Protein 03/14/2020 6.6     Alb 03/14/2020 4.6     Total Bilirubin 03/14/2020 0.3     Alkaline Phosphatase 03/14/2020 84     ALT 03/14/2020 19     AST 03/14/2020 15     Ethanol Lvl 03/14/2020 <10     Color, UA 03/14/2020 YELLOW     Clarity, UA 03/14/2020 Clear     Glucose, Ur 03/14/2020 Negative     Bilirubin Urine 03/14/2020 Negative     Ketones, Urine 03/14/2020 Negative     Specific Gravity, UA 03/14/2020 1.030     Blood, Urine 03/14/2020 Negative     pH, UA 03/14/2020 6.0     Protein, UA 03/14/2020 Negative     Urobilinogen, Urine 03/14/2020 0.2     Nitrite, Urine 03/14/2020 Negative     Leukocyte Esterase, Urine 03/14/2020 Negative     Urine Reflex to Culture 03/14/2020 Not Indicated     Amphetamine Screen, Urine 03/14/2020 Negative     Barbiturate Screen, Ur 03/14/2020 Negative     Benzodiazepine Screen, U* 03/14/2020 Positive*    Cannabinoid Scrn, Ur 03/14/2020 Positive*    Cocaine Metabolite Scree* 03/14/2020 Negative     Opiate Scrn, Ur 03/14/2020 Positive*    Drug Screen Comment: 03/14/2020 see below     TSH Reflex FT4 03/14/2020 1.46     Hemoglobin A1C 03/14/2020 5.0     Cholesterol, Total 03/14/2020 195     Triglycerides 03/14/2020 68     HDL 03/14/2020 45*    LDL Calculated 03/14/2020 136     Vitamin B-12 03/14/2020 483     Vit D, 25-Hydroxy 03/14/2020 16.4*     Copies of these are in the chart. Prior to Visit Medications    Medication Sig Taking? Authorizing Provider   QUEtiapine (SEROQUEL) 300 MG tablet Take 1 tablet by mouth nightly Yes Bashir Scales, APRN   haloperidol (HALDOL) 2 MG tablet Take 1 tablet by mouth 2 times daily Yes Bashir Scales, APRN   sildenafil (VIAGRA) 100 MG tablet Take 1 tablet by mouth daily as needed for Erectile Dysfunction Yes Bashir Scales, APRN   clonazePAM (KLONOPIN) 1 MG tablet Take 1 tablet by mouth 2 times daily as needed for Anxiety for up to 30 days.  Yes Bashir Scales, APRN   lamoTRIgine (LAMICTAL) 100 MG tablet Take 2 tablets by mouth 2 times daily Yes Bashir Scales, APRN   SUMAtriptan (IMITREX) 100 MG tablet Take 1 tablet by mouth 2 times daily as needed for Migraine  Patient taking differently: Take 100 mg by mouth as needed for Migraine  Yes Bashir Scales, APRN   famotidine (PEPCID) 40 MG tablet Take 1 tablet by mouth 2 times daily Yes RADHA Hurley   ibuprofen (ADVIL;MOTRIN) 800 MG tablet Take 1 tablet by mouth 3 times daily (with meals) Yes RADHA Hurley       Allergies: Tetracyclines & related    Past Medical History:   Diagnosis Date    Depression        No past surgical history on file. Social History     Tobacco Use    Smoking status: Former Smoker    Smokeless tobacco: Never Used   Substance Use Topics    Alcohol use: Not Currently       Review of Systems   Constitutional: Positive for activity change. Negative for appetite change, fatigue and fever. HENT: Negative for congestion, postnasal drip and trouble swallowing. Eyes: Negative. Psychiatric/Behavioral: Positive for agitation, decreased concentration and sleep disturbance. Negative for self-injury and suicidal ideas. The patient is nervous/anxious. Physical Exam  Vitals signs reviewed. Constitutional:       General: He is not in acute distress. Appearance: Normal appearance. He is not ill-appearing. HENT:      Head: Normocephalic. Right Ear: Tympanic membrane normal.      Left Ear: Tympanic membrane normal.   Cardiovascular:      Rate and Rhythm: Normal rate and regular rhythm. Heart sounds: No murmur. Pulmonary:      Effort: Pulmonary effort is normal.      Breath sounds: Normal breath sounds. Musculoskeletal:         General: No tenderness. Skin:     Findings: No rash. Neurological:      Mental Status: He is alert and oriented to person, place, and time. Psychiatric:         Mood and Affect: Mood normal.         Behavior: Behavior normal.      Comments: Improving at present           ASSESSMENT/ PLAN    1. Bipolar 1 disorder (HCC)  Cont present  Will adjust seroquel to help at night   Goal try 8 hours of sleep   - QUEtiapine (SEROQUEL) 300 MG tablet; Take 1 tablet by mouth nightly  Dispense: 30 tablet; Refill: 5  - haloperidol (HALDOL) 2 MG tablet;  Take 1 tablet by mouth 2 times daily  Dispense: 60 tablet; Refill: 5  - clonazePAM (KLONOPIN) 1 MG tablet; Take 1 tablet by mouth 2 times daily as needed for Anxiety for up to 30 days. Dispense: 60 tablet; Refill: 0    2. Persistent mood (affective) disorder, unspecified (HCC)  Will need to adjust if needed  - QUEtiapine (SEROQUEL) 300 MG tablet; Take 1 tablet by mouth nightly  Dispense: 30 tablet; Refill: 5  - haloperidol (HALDOL) 2 MG tablet; Take 1 tablet by mouth 2 times daily  Dispense: 60 tablet; Refill: 5  - clonazePAM (KLONOPIN) 1 MG tablet; Take 1 tablet by mouth 2 times daily as needed for Anxiety for up to 30 days. Dispense: 60 tablet; Refill: 0    3. Drug-induced erectile dysfunction  Will use as side effects  - sildenafil (VIAGRA) 100 MG tablet; Take 1 tablet by mouth daily as needed for Erectile Dysfunction  Dispense: 30 tablet; Refill: 2    4. Paranoid behavior ([address]  ipmroving with around work  - clonazePAM (KLONOPIN) 1 MG tablet; Take 1 tablet by mouth 2 times daily as needed for Anxiety for up to 30 days. Dispense: 60 tablet; Refill: 0      No orders of the defined types were placed in this encounter. Return in about 1 month (around 9/11/2020) for franky klonipin haldol adjustment. Patient Instructions     Patient Education        Bipolar Disorder: Care Instructions  Your Care Instructions     Bipolar disorder is an illness that causes extreme mood changes, from times of very high energy (manic episodes) to times of depression. But many people with bipolar disorder show only the symptoms of depression. These moods may cause problems with your work, school, family life, friendships, and how well you function. This disease is also called manic-depression. There is no cure for bipolar disorder, but it can be helped with medicines. Counseling may also help. It is important to take your medicines exactly as prescribed, even when you feel well. You may need lifelong treatment.   Follow-up care is a key part of your treatment and safety. Be sure to make and go to all appointments, and call your doctor if you are having problems. It's also a good idea to know your test results and keep a list of the medicines you take. How can you care for yourself at home? · Be safe with medicines. Take your medicines exactly as prescribed. Do not stop or change a medicine without talking to your doctor first. Danna Soni and your doctor may need to try different combinations of medicines to find what works for you. · Take your medicines on schedule to keep your moods even. When you feel good, you may think that you do not need your medicines. But it is important to keep taking them. · Go to your counseling sessions. Call and talk with your counselor if you can't go to a session or if you don't think the sessions are helping. Do not just stop going. · Get at least 30 minutes of activity on most days of the week. Walking is a good choice. You also may want to do other things, such as running, swimming, or cycling. · Get enough sleep. Keep your room dark and quiet. Try to go to bed at the same time every night. · Eat a healthy diet. This includes whole grains, dairy, fruits, vegetables, and protein. Eat foods from each of these groups. · Try to lower your stress. Manage your time, build a strong support system, and lead a healthy lifestyle. To lower your stress, try physical activity, slow deep breathing, or getting a massage. · Do not use alcohol or illegal drugs. · Learn the early signs of your mood changes. You can then take steps to help yourself feel better. · Ask for help from friends and family when you need it. You may need help with daily chores when you are depressed. When you are manic, you may need support to control your high energy levels. What should you do if someone in your family has bipolar disorder? · Learn about the disease and the signs that it is getting worse.   · Remind your family member that you love him or her.  · Make a plan with all family members about how to take care of your loved one when his or her symptoms are bad. · Talk about your fears and concerns and those of other family members. Seek counseling if needed. · Do not focus attention only on the person who is in treatment. · Remind yourself that it will take time for changes to occur. · Do not blame yourself for the disease. · Know your legal rights and the legal rights of your family member. Support groups or counselors can help you with this information. · Take care of yourself. Keep up with your own interests, such as your career, hobbies, and friends. Use exercise, positive self-talk, deep breathing, and other relaxing exercises to help lower your stress. · Give yourself time to grieve. You may need to deal with emotions such as anger, fear, and frustration. After you work through your feelings, you will be better able to care for yourself and your family. · If you are having a hard time with your feelings or with your relationship with your family member, talk with a counselor. When should you call for help? [id number] anytime you think you may need emergency care. For example, call if:  · You feel like hurting yourself or someone else. · Someone who has bipolar disorder displays dangerous behavior, and you think the person might hurt himself or herself or someone else. Call your doctor now or seek immediate medical care if:  · You hear voices. · Someone you know has bipolar disorder and talks about suicide. Keep the numbers for these national suicide hotlines: 2-924-559-TALK (5-245.926.3899) and 1-083-HWLKCSZ (9-826.740.4873). If a suicide threat seems real, with a specific plan and a way to carry it out, stay with the person, or ask someone you trust to stay with the person, until you can get help. · Someone you know has bipolar disorder and:  ? Starts to give away possessions.   ? Is using illegal drugs or drinking alcohol heavily. ? Talks or writes about death, including writing suicide notes or talking about guns, knives, or pills. ? Talks or writes about hurting someone else. ? Starts to spend a lot of time alone. ? Acts very aggressively or suddenly appears calm. ? Talks about beliefs that are not based in reality (delusions). Watch closely for changes in your health, and be sure to contact your doctor if:  · You cannot go to your counseling sessions. Where can you learn more? Go to https://chmaueb.Waveseis. org and sign in to your [employer] account. Enter K052 in the Social Game Universe box to learn more about \"Bipolar Disorder: Care Instructions. \"     If you do not have an account, please click on the \"Sign Up Now\" link. Current as of: January 31, 2020               Content Version: 12.5  © 2006-2020 [employer]. Care instructions adapted under license by [facility] ([facility]). If you have questions about a medical condition or this instruction, always ask your healthcare professional. [address] any warranty or liability for your use of this information. Patient Education        Erectile Dysfunction: Care Instructions  Your Care Instructions     A man has erectile dysfunction (ED) when he routinely can't get or keep an erection that allows satisfactory sex. He may not be able to have an erection at any time. Or he may not be able to have one that is firm enough or lasts long enough to complete intercourse. ED is not the same as having trouble getting an erection now and then. That's common. It happens to most men at some time. ED can be caused by problems with the blood vessels, nerves, or hormones. It can be caused by diabetes, heart disease, and injuries. Nerve disorders, such as multiple sclerosis or Parkinson's disease, can also cause it.   ED can also be caused by medicines, alcohol, and tobacco. Or it may be caused by depression, stress, grief, or relationship problems. Follow-up care is a key part of your treatment and safety. Be sure to make and go to all appointments, and call your doctor if you are having problems. It's also a good idea to know your test results and keep a list of the medicines you take. How can you care for yourself at home? Lifestyle  · Limit alcohol. Have no more than 2 drinks a day. · Do not smoke. Smoking makes it harder for the blood vessels in the penis to relax and let blood flow in. If you need help quitting, talk to your doctor about stop-smoking programs and medicines. These can increase your chances of quitting for good. · Do not use cocaine, heroin, or other illegal drugs. · Try to reduce stress. · Give yourself time to adjust to change. Changes in your job, family, relationships, home life, and other areas can cause stress. And stress can cause erection problems. Work with your partner  · Don't assume that you know what your partner likes when it comes to sex. You may be wrong. Talk about what each of you does and does not enjoy. · Make time outside of the bedroom to talk about your sex life. If you avoid sex because you are afraid of having erection problems, your partner may worry that you are no longer interested. · If you and your partner have trouble talking about sex, see a therapist who can help you talk about it. Reading books with your partner about sexual health may also help. · Relax. Take time for more foreplay. Worrying about your erections may only make things worse. Medicines  · Tell your doctor about all the medicines that you take. ? Some medicines can cause erection problems. ? Some medicines can have dangerous interactions with medicines that are prescribed for ED, including over-the-counter medicines and herbal products. · Be safe with medicines. Take your medicines exactly as prescribed. Call your doctor if you think you are having a problem with your medicine.   · Talk to your doctor about trying a medicine to help you keep an erection. This could be a medicine such as Viagra, Levitra, or Cialis. If you have a heart problem, ask your doctor if these are safe for you. Do not take these medicines if you take nitroglycerin or other nitrate medicine. When should you call for help? Call your doctor now or seek immediate medical care if:  · You took a medicine for erectile dysfunction and you have an erection that lasts longer than 3 hours. Watch closely for changes in your health, and be sure to contact your doctor if you have any problems. Where can you learn more? Go to https://chpepiceweb.[email or web address] org and sign in to your [employer] account. Enter [id number] in the Spectral Image box to learn more about \"Erectile Dysfunction: Care Instructions. \"     If you do not have an account, please click on the \"Sign Up Now\" link. Current as of: February 11, 2020               Content Version: 12.5  © 2006-2020 [employer]. Care instructions adapted under license by [facility] ([facility]). If you have questions about a medical condition or this instruction, always ask your healthcare professional. [place] [address] any warranty or liability for your use of this information. Patient Education        Learning About Mood Disorders  What are mood disorders? Mood disorders are medical problems that affect how you feel. They can impact your moods, thoughts, and actions. Mood disorders include:  · Depression. This causes you to feel sad or hopeless for much of the time. · Bipolar disorder. This causes extreme mood changes from manic episodes of very high energy to extreme lows of depression. · Seasonal affective disorder (SAD). This is a type of depression that affects you during the same season each year. Most often people experience SAD during the fall and winter months when days are shorter and there is less light. What are the symptoms?   Depression  You may:  · Feel sad or bad thoughts about yourself and your life. Light therapy is the main treatment for SAD. This therapy uses a special kind of lamp. You let the lamp shine on you at certain times, usually in the morning. This may help your symptoms during the months when there is less sunlight. Healthy lifestyle  Healthy lifestyle changes may help you feel better. · Be active often. You might try walking or strength training. · Eat a healthy diet. Include fruits, vegetables, lean proteins, and whole grains in your diet each day. · Keep a regular sleep schedule. Try for 8 hours of sleep a night. · Find ways to manage stress, such as relaxation exercises. · Avoid alcohol and illegal drugs. Follow-up care is a key part of your treatment and safety. Be sure to make and go to all appointments, and call your doctor if you are having problems. It's also a good idea to know your test results and keep a list of the medicines you take. Where can you learn more? Go to https://Tadcast.The Point. org and sign in to your [employer] account. Enter P401 in the Sensorly box to learn more about \"Learning About Mood Disorders. \"     If you do not have an account, please click on the \"Sign Up Now\" link. Current as of: January 31, 2020               Content Version: 12.5  © 6563-6406 [employer]. Care instructions adapted under license by [facility] ([facility]). If you have questions about a medical condition or this instruction, always ask your healthcare professional. [place] [address] any warranty or liability for your use of this information. Controlled Substances Monitoring:                   Additional Instructions: As always, patient is advisedto bring in medication bottles in order to correctly reconcile with our current list.      Quirino Castro received counseling on the following healthy behaviors: none    Patient giveneducational materials on plan of care    I have instructed Quirino Castro to complete a self tracking handout on none and instructed them to bring it with them to his next appointment. Discussed use, benefit, and side effects of prescribed medications. Barriers to medication compliance addressed. All patient questions answered. Pt voiced understanding.      RADHA Blanca

## 2020-09-11 ENCOUNTER — VIRTUAL VISIT (OUTPATIENT)
Dept: PRIMARY CARE CLINIC | Age: 35
End: 2020-09-11
Payer: MEDICAID

## 2020-09-11 PROCEDURE — 99442 PR PHYS/QHP TELEPHONE EVALUATION 11-20 MIN: CPT | Performed by: NURSE PRACTITIONER

## 2020-09-11 RX ORDER — CLONAZEPAM 1 MG/1
1 TABLET ORAL 2 TIMES DAILY PRN
Qty: 60 TABLET | Refills: 0 | Status: SHIPPED | OUTPATIENT
Start: 2020-09-11 | End: 2020-11-13

## 2020-09-11 ASSESSMENT — ENCOUNTER SYMPTOMS
EYES NEGATIVE: 1
TROUBLE SWALLOWING: 0

## 2020-09-11 NOTE — PATIENT INSTRUCTIONS
Patient Education        Bipolar Disorder: Care Instructions  Your Care Instructions     Bipolar disorder is an illness that causes extreme mood changes, from times of very high energy (manic episodes) to times of depression. But many people with bipolar disorder show only the symptoms of depression. These moods may cause problems with your work, school, family life, friendships, and how well you function. This disease is also called manic-depression. There is no cure for bipolar disorder, but it can be helped with medicines. Counseling may also help. It is important to take your medicines exactly as prescribed, even when you feel well. You may need lifelong treatment. Follow-up care is a key part of your treatment and safety. Be sure to make and go to all appointments, and call your doctor if you are having problems. It's also a good idea to know your test results and keep a list of the medicines you take. How can you care for yourself at home? · Be safe with medicines. Take your medicines exactly as prescribed. Do not stop or change a medicine without talking to your doctor first. Unknown Jay and your doctor may need to try different combinations of medicines to find what works for you. · Take your medicines on schedule to keep your moods even. When you feel good, you may think that you do not need your medicines. But it is important to keep taking them. · Go to your counseling sessions. Call and talk with your counselor if you can't go to a session or if you don't think the sessions are helping. Do not just stop going. · Get at least 30 minutes of activity on most days of the week. Walking is a good choice. You also may want to do other things, such as running, swimming, or cycling. · Get enough sleep. Keep your room dark and quiet. Try to go to bed at the same time every night. · Eat a healthy diet. This includes whole grains, dairy, fruits, vegetables, and protein.  Eat foods from each of these groups. · Try to lower your stress. Manage your time, build a strong support system, and lead a healthy lifestyle. To lower your stress, try physical activity, slow deep breathing, or getting a massage. · Do not use alcohol or illegal drugs. · Learn the early signs of your mood changes. You can then take steps to help yourself feel better. · Ask for help from friends and family when you need it. You may need help with daily chores when you are depressed. When you are manic, you may need support to control your high energy levels. What should you do if someone in your family has bipolar disorder? · Learn about the disease and the signs that it is getting worse. · Remind your family member that you love him or her. · Make a plan with all family members about how to take care of your loved one when his or her symptoms are bad. · Talk about your fears and concerns and those of other family members. Seek counseling if needed. · Do not focus attention only on the person who is in treatment. · Remind yourself that it will take time for changes to occur. · Do not blame yourself for the disease. · Know your legal rights and the legal rights of your family member. Support groups or counselors can help you with this information. · Take care of yourself. Keep up with your own interests, such as your career, hobbies, and friends. Use exercise, positive self-talk, deep breathing, and other relaxing exercises to help lower your stress. · Give yourself time to grieve. You may need to deal with emotions such as anger, fear, and frustration. After you work through your feelings, you will be better able to care for yourself and your family. · If you are having a hard time with your feelings or with your relationship with your family member, talk with a counselor. When should you call for help? [id number] anytime you think you may need emergency care.  For example, call if:  · You feel like hurting yourself or someone else.  · Someone who has bipolar disorder displays dangerous behavior, and you think the person might hurt himself or herself or someone else. Call your doctor now or seek immediate medical care if:  · You hear voices. · Someone you know has bipolar disorder and talks about suicide. Keep the numbers for these national suicide hotlines: 0-627-717-TALK (4-511.917.3597) and 0-716-UMOAXNZ (9-722.311.9486). If a suicide threat seems real, with a specific plan and a way to carry it out, stay with the person, or ask someone you trust to stay with the person, until you can get help. · Someone you know has bipolar disorder and:  ? Starts to give away possessions. ? Is using illegal drugs or drinking alcohol heavily. ? Talks or writes about death, including writing suicide notes or talking about guns, knives, or pills. ? Talks or writes about hurting someone else. ? Starts to spend a lot of time alone. ? Acts very aggressively or suddenly appears calm. ? Talks about beliefs that are not based in reality (delusions). Watch closely for changes in your health, and be sure to contact your doctor if:  · You cannot go to your counseling sessions. Where can you learn more? Go to https://Outbrain.DEQ. org and sign in to your [employer] account. Enter K052 in the [facility] box to learn more about \"Bipolar Disorder: Care Instructions. \"     If you do not have an account, please click on the \"Sign Up Now\" link. Current as of: January 31, 2020               Content Version: 12.5  © 1095-4638 [employer]. Care instructions adapted under license by [facility] ([facility]). If you have questions about a medical condition or this instruction, always ask your healthcare professional. [place] [address] any warranty or liability for your use of this information.          Patient Education        cariprazine  Pronunciation:  misha rainey  Brand:  July Jones  What is the most important information I should know about cariprazine? Cariprazine is not approved for use in older adults with dementia-related psychosis. What is cariprazine? Cariprazine is an antipsychotic medicine that is used to treat schizophrenia in adults. Cariprazine is also used to treat manic or mixed episodes in adults with bipolar disorder type I. Cariprazine may also be used for purposes not listed in this medication guide. What should I discuss with my healthcare provider before taking cariprazine? You should not use cariprazine if you are allergic to it. Cariprazine is not approved for use by anyone younger than 25years old. Cariprazine may increase the risk of death in older adults with dementia-related psychosis and is not approved for this use. Tell your doctor if you have ever had:  · heart disease, high blood pressure;  · a stroke or blood clot;  · high cholesterol or triglycerides (a type of fat in the blood);  · liver or kidney disease;  · diabetes; or  · if you are dehydrated. Taking antipsychotic medicine in the last 3 months of pregnancy may cause withdrawal symptoms, breathing problems, feeding problems, fussiness, tremors, and limp or stiff muscles in the . If you get pregnant, tell your doctor right away. Do not stop taking cariprazine without your doctor's advice. If you are pregnant, your name may be listed on a pregnancy registry to track the effects of cariprazine on the baby. It may not be safe to breast-feed while using this medicine. Ask your doctor about any risk. How should I take cariprazine? Follow all directions on your prescription label and read all medication guides or instruction sheets. Your doctor may occasionally change your dose. Use the medicine exactly as directed. High doses or long-term use of cariprazine can cause a serious movement disorder that may not be reversible.  The longer you use cariprazine, the more likely you are to develop this disorder, especially if you are an older adult. Symptoms of this disorder include tremors or other uncontrollable muscle movements. You may take cariprazine with or without food. It may take several weeks before your symptoms improve. Keep using the medication as directed and tell your doctor if your symptoms do not improve. Tell your doctor if you have any changes in weight while taking this medicine. Your blood pressure and heart rate will need to be checked often. You may also need frequent blood tests. Store this medicine in its original packaging at room temperature, away from moisture, heat, and light. What happens if I miss a dose? Take the medicine as soon as you can, but skip the missed dose if it is almost time for your next dose. Do not take two doses at one time. What happens if I overdose? Seek emergency medical attention or call the Poison Help line at 1-825.948.4842. What should I avoid while taking cariprazine? Avoid drinking alcohol. Dangerous side effects could occur. Avoid driving or hazardous activity until you know how this medicine will affect you. Avoid getting up too fast from a sitting or lying position, or you may feel dizzy. Dizziness or drowsiness can cause falls, accidents, or severe injuries. While you are taking cariprazine, you may be more sensitive to temperature extremes such as very hot conditions. Avoid becoming overheated or dehydrated. Drink plenty of fluids, especially in hot weather and during exercise. What are the possible side effects of cariprazine? Get emergency medical help if you have signs of an allergic reaction: hives; difficult breathing; swelling of your face, lips, tongue, or throat.   Call your doctor at once if you have:  · sudden numbness or weakness (especially on one side of the body);  · problems with vision or speech,  · a light-headed feeling, like you might pass out;  · severe distress or agitation;  · a seizure;  · uncontrolled muscle movements in your face (chewing, lip smacking, frowning, tongue movement, blinking or eye movement);  · trouble swallowing, or accidentally inhaling food or drink;  · low white blood cell counts --fever, chills, sore throat, mouth sores, skin sores, sore throat, cough, trouble breathing, feeling light-headed;  · high blood sugar --increased thirst, increased urination, dry mouth, fruity breath odor; or  · severe nervous system reaction --very stiff (rigid) muscles, high fever, sweating, confusion, fast or uneven heartbeats, tremors, feeling like you might pass out. Cariprazine can have long lasting effects on your body. Some side effects could occur for several weeks after you stop using this medicine. You may also have new side effects whenever your dose is changed. Common side effects may include:  · involuntary muscle movements;  · upset stomach, vomiting;  · drowsiness; or  · feeling restless. This is not a complete list of side effects and others may occur. Call your doctor for medical advice about side effects. You may report side effects to FDA at 0-052-FDA-8276. What other drugs will affect cariprazine? Sometimes it is not safe to use certain medications at the same time. Some drugs can affect your blood levels of other drugs you take, which may increase side effects or make the medications less effective. Taking cariprazine with other drugs that make you sleepy or slow your breathing can cause dangerous side effects or death. Ask your doctor before using opioid medication, a sleeping pill, a muscle relaxer, or medicine for anxiety or seizures. Many drugs can affect cariprazine. This includes prescription and over-the-counter medicines, vitamins, and herbal products. Not all possible interactions are listed here. Tell your doctor about all your current medicines and any medicine you start or stop using. Where can I get more information? Your pharmacist can provide more information about cariprazine.   Remember, keep this and all other medicines out of the reach of children, never share your medicines with others, and use this medication only for the indication prescribed. Every effort has been made to ensure that the information provided by [address] is accurate, up-to-date, and complete, but no guarantee is made to that effect. Drug information contained herein may be time sensitive. [facility] information has been compiled for use by healthcare practitioners and consumers in the [place] and therefore [facility] does not warrant that uses outside of the [place] are appropriate, unless specifically indicated otherwise. [facility]'s drug information does not endorse drugs, diagnose patients or recommend therapy. [facility]'s drug information is an informational resource designed to assist licensed healthcare practitioners in caring for their patients and/or to serve consumers viewing this service as a supplement to, and not a substitute for, the expertise, skill, knowledge and judgment of healthcare practitioners. The absence of a warning for a given drug or drug combination in no way should be construed to indicate that the drug or drug combination is safe, effective or appropriate for any given patient. [facility] does not assume any responsibility for any aspect of healthcare administered with the aid of information [facility] provides. The information contained herein is not intended to cover all possible uses, directions, precautions, warnings, drug interactions, allergic reactions, or adverse effects. If you have questions about the drugs you are taking, check with your doctor, nurse or pharmacist.  Copyright 8820-1193 [address]: 2.02. Revision date: 2/5/2020. Care instructions adapted under license by [facility] ([facility]).  If you have questions about a medical condition or this instruction, always ask your healthcare professional. [place] [address] any warranty or liability for your use of this information.

## 2020-09-11 NOTE — PROGRESS NOTES
[address]  [place], [address] [place] [address] Rd  Phone [phone]   Fax [phone]      OFFICE VISIT: 2020    Genesis Snowden- : 1985      Reason For Visit:  Ron Thomas is a 29 y.o. Cerda Mark is here for 1 Month Follow-Up (klonipin )         Health Maintenance    HPI      Patient is for follow up on telephone  For issues with moods  Reports that \"perhaps haldol isn't for me \"  I am super paranoid  Feels like friends and girlfriend are doing something \"  He feels like this despite and in a zone     Reports he is sleeping  He reports getting rest on 300mg nightly  Will sleep around 9-10   Well rested    He feels like everyone out to get him  Get his money  Get his drugs  And constant paranoid  And even in prison  He has \"always been\"    He has taken haldol   And seroquel thorazine depakote   remeron and he still issues with relationships   And communicating  He will get a fight and bothered      Anxiety  He has been taking klonipin twice a day  It is helpful to work and function  But he has to have to to function at present  *60     vitals were not taken for this visit. There is no height or weight on file to calculate BMI.     Results for orders placed or performed in visit on 20   POCT Rapid Drug Screen   Result Value Ref Range    Amphetamine Screen, Urine neg     Barbiturate Screen, Urine neg     Benzodiazepine Screen, Urine neg     Buprenorphine Urine neg     Cocaine Metabolite Screen, Urine neg     Gabapentin Screen, Urine neg     MDMA, Urine neg     Methamphetamine, Urine neg     Methadone Screen, Urine      Opiate Scrn, Ur neg     Oxycodone Screen, Ur neg     PCP Screen, Urine      Propoxyphene Screen, Urine      THC Screen, Urine neg     Tricyclic Antidepressants, Urine         I have reviewed the following with the Mr. Biswas Main   Lab Review   Office Visit on 2020   Component Date Value    Amphetamine Screen, Urine 2020 neg     Barbiturate Screen, Urine 2020 neg     Benzodiazepine Screen, U* 06/29/2020 neg     Buprenorphine Urine 06/29/2020 neg     Cocaine Metabolite Scree* 06/29/2020 neg     Gabapentin Screen, Urine 06/29/2020 neg     MDMA, Urine 06/29/2020 neg     Methamphetamine, Urine 06/29/2020 neg     Opiate Scrn, Ur 06/29/2020 neg     Oxycodone Screen, Ur 06/29/2020 neg     THC Screen, Urine 06/29/2020 neg    Admission on 03/14/2020, Discharged on 03/17/2020   Component Date Value    WBC 03/14/2020 8.5     RBC 03/14/2020 4.45*    Hemoglobin 03/14/2020 14.0     Hematocrit 03/14/2020 40.9*    MCV 03/14/2020 91.9     MCH 03/14/2020 31.5*    MCHC 03/14/2020 34.2     RDW 03/14/2020 11.8     Platelets 81/83/1893 299     MPV 03/14/2020 8.9*    Neutrophils % 03/14/2020 70.8*    Lymphocytes % 03/14/2020 20.8     Monocytes % 03/14/2020 5.6     Eosinophils % 03/14/2020 1.8     Basophils % 03/14/2020 0.6     Neutrophils Absolute 03/14/2020 6.0     Immature Granulocytes # 03/14/2020 0.0     Lymphocytes Absolute 03/14/2020 1.8     Monocytes Absolute 03/14/2020 0.50     Eosinophils Absolute 03/14/2020 0.20     Basophils Absolute 03/14/2020 0.10     Sodium 03/14/2020 142     Potassium reflex Magnesi* 03/14/2020 4.3     Chloride 03/14/2020 106     CO2 03/14/2020 27     Anion Gap 03/14/2020 9     Glucose 03/14/2020 99     BUN 03/14/2020 14     CREATININE 03/14/2020 1.1     GFR Non- 03/14/2020 >60     Calcium 03/14/2020 9.2     Total Protein 03/14/2020 6.6     Alb 03/14/2020 4.6     Total Bilirubin 03/14/2020 0.3     Alkaline Phosphatase 03/14/2020 84     ALT 03/14/2020 19     AST 03/14/2020 15     Ethanol Lvl 03/14/2020 <10     Color, UA 03/14/2020 YELLOW     Clarity, UA 03/14/2020 Clear     Glucose, Ur 03/14/2020 Negative     Bilirubin Urine 03/14/2020 Negative     Ketones, Urine 03/14/2020 Negative     Specific Gravity, UA 03/14/2020 1.030     Blood, Urine 03/14/2020 Negative     pH, UA 03/14/2020 6.0     Protein, UA 03/14/2020 Negative     Urobilinogen, Urine 03/14/2020 0.2     Nitrite, Urine 03/14/2020 Negative     Leukocyte Esterase, Urine 03/14/2020 Negative     Urine Reflex to Culture 03/14/2020 Not Indicated     Amphetamine Screen, Urine 03/14/2020 Negative     Barbiturate Screen, Ur 03/14/2020 Negative     Benzodiazepine Screen, U* 03/14/2020 Positive*    Cannabinoid Scrn, Ur 03/14/2020 Positive*    Cocaine Metabolite Scree* 03/14/2020 Negative     Opiate Scrn, Ur 03/14/2020 Positive*    Drug Screen Comment: 03/14/2020 see below     TSH Reflex FT4 03/14/2020 1.46     Hemoglobin A1C 03/14/2020 5.0     Cholesterol, Total 03/14/2020 195     Triglycerides 03/14/2020 68     HDL 03/14/2020 45*    LDL Calculated 03/14/2020 136     Vitamin B-12 03/14/2020 483     Vit D, 25-Hydroxy 03/14/2020 16.4*     Copies of these are in the chart. Prior to Visit Medications    Medication Sig Taking? Authorizing Provider   cariprazine hcl (VRAYLAR) 3 MG CAPS capsule Take 1 capsule by mouth daily Yes RADHA Ng   clonazePAM (KLONOPIN) 1 MG tablet Take 1 tablet by mouth 2 times daily as needed for Anxiety for up to 30 days.  Yes RADHA Ng   QUEtiapine (SEROQUEL) 300 MG tablet Take 1 tablet by mouth nightly Yes RADHA Ng   sildenafil (VIAGRA) 100 MG tablet Take 1 tablet by mouth daily as needed for Erectile Dysfunction Yes RADHA Ng   haloperidol (HALDOL) 2 MG tablet Take 1 tablet by mouth 3 times daily Yes RADHA Ng   lamoTRIgine (LAMICTAL) 100 MG tablet Take 2 tablets by mouth 2 times daily Yes RADHA Ng   famotidine (PEPCID) 40 MG tablet Take 1 tablet by mouth 2 times daily Yes RADHA Ng   ibuprofen (ADVIL;MOTRIN) 800 MG tablet Take 1 tablet by mouth 3 times daily (with meals) Yes RADHA Ng       Allergies: Tetracyclines & related    Past Medical History:   Diagnosis Date    Depression        No past surgical history on file. Social History     Tobacco Use    Smoking status: Former Smoker    Smokeless tobacco: Never Used   Substance Use Topics    Alcohol use: Not Currently       Review of Systems   Constitutional: Negative for activity change, appetite change, fatigue and fever. HENT: Negative for congestion, postnasal drip and trouble swallowing. Eyes: Negative. Psychiatric/Behavioral: Positive for agitation (still present but improving). Negative for decreased concentration, self-injury, sleep disturbance (improved on seroquel nightly) and suicidal ideas. The patient is not nervous/anxious. Paranoid and antisocial behavior         Physical Exam  Vitals signs reviewed. Constitutional:       General: He is not in acute distress. Appearance: Normal appearance. He is not ill-appearing. HENT:      Head: Normocephalic. Right Ear: Tympanic membrane normal.      Left Ear: Tympanic membrane normal.   Cardiovascular:      Rate and Rhythm: Normal rate and regular rhythm. Heart sounds: No murmur. Pulmonary:      Effort: Pulmonary effort is normal.      Breath sounds: Normal breath sounds. Musculoskeletal:         General: No tenderness. Skin:     Findings: No rash. Neurological:      Mental Status: He is alert and oriented to person, place, and time. Psychiatric:         Mood and Affect: Mood normal.         Behavior: Behavior normal.      Comments: Improving at present           ASSESSMENT/ PLAN    Elidia Pond is  being evaluated by a Virtual Visit telephone encounter 15 minutes to address concerns as mentioned above. A caregiver was present when appropriate. Due to this being a TeleHealth encounter (During [place] [address] public health emergency), evaluation of the following organ systems was limited: Vitals/Constitutional/EENT/Resp/CV/GI//MS/Neuro/Skin/Heme-Lymph-Imm.   Pursuant to the emergency declaration under the [address] [facility] waiver authority and Care Instructions     Bipolar disorder is an illness that causes extreme mood changes, from times of very high energy (manic episodes) to times of depression. But many people with bipolar disorder show only the symptoms of depression. These moods may cause problems with your work, school, family life, friendships, and how well you function. This disease is also called manic-depression. There is no cure for bipolar disorder, but it can be helped with medicines. Counseling may also help. It is important to take your medicines exactly as prescribed, even when you feel well. You may need lifelong treatment. Follow-up care is a key part of your treatment and safety. Be sure to make and go to all appointments, and call your doctor if you are having problems. It's also a good idea to know your test results and keep a list of the medicines you take. How can you care for yourself at home? · Be safe with medicines. Take your medicines exactly as prescribed. Do not stop or change a medicine without talking to your doctor first. Carey Hinds and your doctor may need to try different combinations of medicines to find what works for you. · Take your medicines on schedule to keep your moods even. When you feel good, you may think that you do not need your medicines. But it is important to keep taking them. · Go to your counseling sessions. Call and talk with your counselor if you can't go to a session or if you don't think the sessions are helping. Do not just stop going. · Get at least 30 minutes of activity on most days of the week. Walking is a good choice. You also may want to do other things, such as running, swimming, or cycling. · Get enough sleep. Keep your room dark and quiet. Try to go to bed at the same time every night. · Eat a healthy diet. This includes whole grains, dairy, fruits, vegetables, and protein. Eat foods from each of these groups. · Try to lower your stress.  Manage your time, build a strong support system, and lead a healthy lifestyle. To lower your stress, try physical activity, slow deep breathing, or getting a massage. · Do not use alcohol or illegal drugs. · Learn the early signs of your mood changes. You can then take steps to help yourself feel better. · Ask for help from friends and family when you need it. You may need help with daily chores when you are depressed. When you are manic, you may need support to control your high energy levels. What should you do if someone in your family has bipolar disorder? · Learn about the disease and the signs that it is getting worse. · Remind your family member that you love him or her. · Make a plan with all family members about how to take care of your loved one when his or her symptoms are bad. · Talk about your fears and concerns and those of other family members. Seek counseling if needed. · Do not focus attention only on the person who is in treatment. · Remind yourself that it will take time for changes to occur. · Do not blame yourself for the disease. · Know your legal rights and the legal rights of your family member. Support groups or counselors can help you with this information. · Take care of yourself. Keep up with your own interests, such as your career, hobbies, and friends. Use exercise, positive self-talk, deep breathing, and other relaxing exercises to help lower your stress. · Give yourself time to grieve. You may need to deal with emotions such as anger, fear, and frustration. After you work through your feelings, you will be better able to care for yourself and your family. · If you are having a hard time with your feelings or with your relationship with your family member, talk with a counselor. When should you call for help? [id number] anytime you think you may need emergency care. For example, call if:  · You feel like hurting yourself or someone else.   · Someone who has bipolar disorder displays dangerous behavior, and you think the person might hurt himself or herself or someone else. Call your doctor now or seek immediate medical care if:  · You hear voices. · Someone you know has bipolar disorder and talks about suicide. Keep the numbers for these national suicide hotlines: 8-457-690-TALK (5-783.701.7956) and 7-292-RXVLJWC (8-555.303.9133). If a suicide threat seems real, with a specific plan and a way to carry it out, stay with the person, or ask someone you trust to stay with the person, until you can get help. · Someone you know has bipolar disorder and:  ? Starts to give away possessions. ? Is using illegal drugs or drinking alcohol heavily. ? Talks or writes about death, including writing suicide notes or talking about guns, knives, or pills. ? Talks or writes about hurting someone else. ? Starts to spend a lot of time alone. ? Acts very aggressively or suddenly appears calm. ? Talks about beliefs that are not based in reality (delusions). Watch closely for changes in your health, and be sure to contact your doctor if:  · You cannot go to your counseling sessions. Where can you learn more? Go to [email or web address] and sign in to your [employer] account. Enter K052 in the [facility] box to learn more about \"Bipolar Disorder: Care Instructions. \"     If you do not have an account, please click on the \"Sign Up Now\" link. Current as of: January 31, 2020               Content Version: 12.5  © 2006-2020 Healthwise, Incorporated. Care instructions adapted under license by [employer]. If you have questions about a medical condition or this instruction, always ask your healthcare professional. [place] [address] any warranty or liability for your use of this information. Patient Education        cariprazine  Pronunciation:  misha rainey  Brand:  Dusty Gay  What is the most important information I should know about cariprazine?   Cariprazine is not approved for use uncontrollable muscle movements. You may take cariprazine with or without food. It may take several weeks before your symptoms improve. Keep using the medication as directed and tell your doctor if your symptoms do not improve. Tell your doctor if you have any changes in weight while taking this medicine. Your blood pressure and heart rate will need to be checked often. You may also need frequent blood tests. Store this medicine in its original packaging at room temperature, away from moisture, heat, and light. What happens if I miss a dose? Take the medicine as soon as you can, but skip the missed dose if it is almost time for your next dose. Do not take two doses at one time. What happens if I overdose? Seek emergency medical attention or call the Poison Help line at 1-385.463.5898. What should I avoid while taking cariprazine? Avoid drinking alcohol. Dangerous side effects could occur. Avoid driving or hazardous activity until you know how this medicine will affect you. Avoid getting up too fast from a sitting or lying position, or you may feel dizzy. Dizziness or drowsiness can cause falls, accidents, or severe injuries. While you are taking cariprazine, you may be more sensitive to temperature extremes such as very hot conditions. Avoid becoming overheated or dehydrated. Drink plenty of fluids, especially in hot weather and during exercise. What are the possible side effects of cariprazine? Get emergency medical help if you have signs of an allergic reaction: hives; difficult breathing; swelling of your face, lips, tongue, or throat.   Call your doctor at once if you have:  · sudden numbness or weakness (especially on one side of the body);  · problems with vision or speech,  · a light-headed feeling, like you might pass out;  · severe distress or agitation;  · a seizure;  · uncontrolled muscle movements in your face (chewing, lip smacking, frowning, tongue movement, blinking or eye movement);  · trouble swallowing, or accidentally inhaling food or drink;  · low white blood cell counts --fever, chills, sore throat, mouth sores, skin sores, sore throat, cough, trouble breathing, feeling light-headed;  · high blood sugar --increased thirst, increased urination, dry mouth, fruity breath odor; or  · severe nervous system reaction --very stiff (rigid) muscles, high fever, sweating, confusion, fast or uneven heartbeats, tremors, feeling like you might pass out. Cariprazine can have long lasting effects on your body. Some side effects could occur for several weeks after you stop using this medicine. You may also have new side effects whenever your dose is changed. Common side effects may include:  · involuntary muscle movements;  · upset stomach, vomiting;  · drowsiness; or  · feeling restless. This is not a complete list of side effects and others may occur. Call your doctor for medical advice about side effects. You may report side effects to FDA at 0-610-FDA-6225. What other drugs will affect cariprazine? Sometimes it is not safe to use certain medications at the same time. Some drugs can affect your blood levels of other drugs you take, which may increase side effects or make the medications less effective. Taking cariprazine with other drugs that make you sleepy or slow your breathing can cause dangerous side effects or death. Ask your doctor before using opioid medication, a sleeping pill, a muscle relaxer, or medicine for anxiety or seizures. Many drugs can affect cariprazine. This includes prescription and over-the-counter medicines, vitamins, and herbal products. Not all possible interactions are listed here. Tell your doctor about all your current medicines and any medicine you start or stop using. Where can I get more information? Your pharmacist can provide more information about cariprazine.   Remember, keep this and all other medicines out of the reach of children, never share your medicines with others, and use this medication only for the indication prescribed. Every effort has been made to ensure that the information provided by [address] [place], [place] [address]can Dr is accurate, up-to-date, and complete, but no guarantee is made to that effect. Drug information contained herein may be time sensitive. [facility] information has been compiled for use by healthcare practitioners and consumers in the [place] and therefore [facility] does not warrant that uses outside of the [place] are appropriate, unless specifically indicated otherwise. [facility]'s drug information does not endorse drugs, diagnose patients or recommend therapy. [facility] drug information is an informational resource designed to assist licensed healthcare practitioners in caring for their patients and/or to serve consumers viewing this service as a supplement to, and not a substitute for, the expertise, skill, knowledge and judgment of healthcare practitioners. The absence of a warning for a given drug or drug combination in no way should be construed to indicate that the drug or drug combination is safe, effective or appropriate for any given patient. [facility] does not assume any responsibility for any aspect of healthcare administered with the aid of information [facility] provides. The information contained herein is not intended to cover all possible uses, directions, precautions, warnings, drug interactions, allergic reactions, or adverse effects. If you have questions about the drugs you are taking, check with your doctor, nurse or pharmacist.  Copyright 0382-1273 [address]: 2.02. Revision date: 2/5/2020. Care instructions adapted under license by [facility] ([facility]). If you have questions about a medical condition or this instruction, always ask your healthcare professional. [place] [address] any warranty or liability for your use of this information.              Controlled Substances Monitoring: Attestation: The Prescription Monitoring Report for this patient was reviewed today. RADHA Euceda)  Periodic Controlled Substance Monitoring: Possible medication side effects, risk of tolerance/dependence & alternative treatments discussed., No signs of potential drug abuse or diversion identified., Obtaining appropriate analgesic effect of treatment. RADHA Euceda)            Additional Instructions: As always, patient is Kervin Cons bring in medication bottles in order to correctly reconcile with our current list.      Mis Mclean received counseling on the following healthy behaviors: none    Patient giveneducational materials on plan of care    I have instructed Mis Caridad to complete a self tracking handout on none and instructed them to bring it with them to his next appointment. Discussed use, benefit, and side effects of prescribed medications. Barriers to medication compliance addressed. All patient questions answered. Pt voiced understanding.      RADHA Euceda

## 2020-09-17 ENCOUNTER — TELEPHONE (OUTPATIENT)
Dept: PRIMARY CARE CLINIC | Age: 35
End: 2020-09-17

## 2020-09-18 ENCOUNTER — TELEPHONE (OUTPATIENT)
Dept: PRIMARY CARE CLINIC | Age: 35
End: 2020-09-18

## 2020-09-18 NOTE — TELEPHONE ENCOUNTER
Received a denial from [facility] on pts Vraylar. This medication cannot be approved because it did not meet medical criteria. They are offering a chance to speak to a medical review person. This is not an appeal.    Printed this information out and gave to RADHA Fuentes for peer to peer review.

## 2020-09-18 NOTE — TELEPHONE ENCOUNTER
Call placed to pt to let him know that his Dimas Dyson was denied but that Reina Weir is going to do a peer to peer with them and try and get this approved. I also told him that we did have some samples here and I will put his name on them and he can come by and pick those up. Pt understood.

## 2020-09-18 NOTE — TELEPHONE ENCOUNTER
Pt called, he wasn't able to get the new med for schizophrenia in place of Haldol.  Wants it to [address] [place] [address]

## 2020-10-05 ENCOUNTER — TELEPHONE (OUTPATIENT)
Dept: PRIMARY CARE CLINIC | Age: 35
End: 2020-10-05

## 2020-10-05 NOTE — TELEPHONE ENCOUNTER
Pt called requesting a  Call back to see when his next apt was with Dr Stoney Dillon. Asked that we call  because his phone is almost out of minutes. Call returned to pt with apt.  Left msg with domestic partner

## 2020-10-07 ENCOUNTER — TELEPHONE (OUTPATIENT)
Dept: PRIMARY CARE CLINIC | Age: 35
End: 2020-10-07

## 2020-10-07 NOTE — TELEPHONE ENCOUNTER
Chances are the poxo-gw-eboo is no longer an option because I sent this message to you on 9/18/2020 and it was never answered. Me           9/18/20 10:36 AM   Note      Received a denial from [facility] on pts Vraylar. This medication cannot be approved because it did not meet medical criteria. They are offering a chance to speak to a medical review person.  This is not an appeal.    Printed this information out and gave to RADHA Doll for peer to peer review.

## 2020-10-12 ENCOUNTER — OFFICE VISIT (OUTPATIENT)
Dept: PRIMARY CARE CLINIC | Age: 35
End: 2020-10-12
Payer: MEDICAID

## 2020-10-12 VITALS
HEART RATE: 83 BPM | OXYGEN SATURATION: 97 % | SYSTOLIC BLOOD PRESSURE: 114 MMHG | BODY MASS INDEX: 31.98 KG/M2 | DIASTOLIC BLOOD PRESSURE: 82 MMHG | TEMPERATURE: 97.3 F | WEIGHT: 211 LBS | HEIGHT: 68 IN

## 2020-10-12 PROCEDURE — 99214 OFFICE O/P EST MOD 30 MIN: CPT | Performed by: NURSE PRACTITIONER

## 2020-10-12 RX ORDER — IBUPROFEN 800 MG/1
800 TABLET ORAL
Qty: 90 TABLET | Refills: 5 | Status: SHIPPED | OUTPATIENT
Start: 2020-10-12 | End: 2021-04-08 | Stop reason: SDUPTHER

## 2020-10-12 RX ORDER — HALOPERIDOL 2 MG/1
2 TABLET ORAL 3 TIMES DAILY
Qty: 90 TABLET | Refills: 5 | Status: SHIPPED | OUTPATIENT
Start: 2020-10-12 | End: 2020-10-29 | Stop reason: SDUPTHER

## 2020-10-12 RX ORDER — CLONAZEPAM 1 MG/1
1 TABLET ORAL 2 TIMES DAILY PRN
Qty: 60 TABLET | Refills: 0 | Status: SHIPPED | OUTPATIENT
Start: 2020-10-12 | End: 2020-11-13 | Stop reason: SDUPTHER

## 2020-10-12 RX ORDER — FAMOTIDINE 40 MG/1
40 TABLET, FILM COATED ORAL 2 TIMES DAILY
Qty: 60 TABLET | Refills: 5 | Status: SHIPPED | OUTPATIENT
Start: 2020-10-12 | End: 2020-12-14 | Stop reason: SDUPTHER

## 2020-10-12 RX ORDER — LAMOTRIGINE 100 MG/1
200 TABLET ORAL 2 TIMES DAILY
Qty: 60 TABLET | Refills: 5 | Status: SHIPPED | OUTPATIENT
Start: 2020-10-12 | End: 2020-12-14 | Stop reason: SDUPTHER

## 2020-10-12 RX ORDER — QUETIAPINE FUMARATE 300 MG/1
300 TABLET, FILM COATED ORAL NIGHTLY
Qty: 30 TABLET | Refills: 5 | Status: SHIPPED | OUTPATIENT
Start: 2020-10-12 | End: 2020-12-14 | Stop reason: SDUPTHER

## 2020-10-12 ASSESSMENT — ENCOUNTER SYMPTOMS
TROUBLE SWALLOWING: 0
EYES NEGATIVE: 1

## 2020-10-12 NOTE — PATIENT INSTRUCTIONS
Patient Education        Bipolar Disorder: Care Instructions  Your Care Instructions     Bipolar disorder is an illness that causes extreme mood changes, from times of very high energy (manic episodes) to times of depression. But many people with bipolar disorder show only the symptoms of depression. These moods may cause problems with your work, school, family life, friendships, and how well you function. This disease is also called manic-depression. There is no cure for bipolar disorder, but it can be helped with medicines. Counseling may also help. It is important to take your medicines exactly as prescribed, even when you feel well. You may need lifelong treatment. Follow-up care is a key part of your treatment and safety. Be sure to make and go to all appointments, and call your doctor if you are having problems. It's also a good idea to know your test results and keep a list of the medicines you take. How can you care for yourself at home? · Be safe with medicines. Take your medicines exactly as prescribed. Do not stop or change a medicine without talking to your doctor first. Alondra Pritchett and your doctor may need to try different combinations of medicines to find what works for you. · Take your medicines on schedule to keep your moods even. When you feel good, you may think that you do not need your medicines. But it is important to keep taking them. · Go to your counseling sessions. Call and talk with your counselor if you can't go to a session or if you don't think the sessions are helping. Do not just stop going. · Get at least 30 minutes of activity on most days of the week. Walking is a good choice. You also may want to do other things, such as running, swimming, or cycling. · Get enough sleep. Keep your room dark and quiet. Try to go to bed at the same time every night. · Eat a healthy diet. This includes whole grains, dairy, fruits, vegetables, and protein.  Eat foods from each of these groups. · Try to lower your stress. Manage your time, build a strong support system, and lead a healthy lifestyle. To lower your stress, try physical activity, slow deep breathing, or getting a massage. · Do not use alcohol, marijuana, or illegal drugs. · Learn the early signs of your mood changes. You can then take steps to help yourself feel better. · Ask for help from friends and family when you need it. You may need help with daily chores when you are depressed. When you are manic, you may need support to control your high energy levels. What should you do if someone in your family has bipolar disorder? · Learn about the disease and signs it's getting worse. · Remind your family member you love them. · Make a plan with all family members about how to take care of your loved one when symptoms are bad. · Remind yourself it will take time for changes to occur. · Try not to blame yourself for the disease. · Know your legal rights and the legal rights of your family member. Support groups or counselors can help with this information. · Take care of yourself. Keep up with your interests, such as career, hobbies, and friends. Use exercise, positive self-talk, deep breathing, and other relaxing exercises to help lower your stress. · Give yourself time to grieve. You may need to deal with emotions such as anger, fear, and frustration. · If you are having a hard time with your feelings or with your relationship with your family member, talk with a counselor. When should you call for help? Call 911 anytime you think you may need emergency care. For example, call if:    · You feel like hurting yourself or someone else.     · Someone who has bipolar disorder displays dangerous behavior, and you think the person might hurt himself or herself or someone else. Call your doctor now or seek immediate medical care if:    · You hear voices.     · Someone you know has bipolar disorder and talks about suicide.  Keep the numbers for these national suicide hotlines: 6-512-097-TALK (0-186.494.5575) and 4-475-SCNWQSR (9-237.376.9535). If a suicide threat seems real, with a specific plan and a way to carry it out, stay with the person, or ask someone you trust to stay with the person, until you can get help.     · Someone you know has bipolar disorder and:  ? Starts to give away possessions. ? Is using illegal drugs or drinking alcohol heavily. ? Talks or writes about death, including writing suicide notes or talking about guns, knives, or pills. ? Talks or writes about hurting someone else. ? Starts to spend a lot of time alone. ? Acts very aggressively or suddenly appears calm. ? Talks about beliefs that are not based in reality (delusions). Watch closely for changes in your health, and be sure to contact your doctor if:    · You cannot go to your counseling sessions. Where can you learn more? Go to https://RatingBug.[email or web address]. org and sign in to your Talko account. Enter K052 in the [facility] box to learn more about \"Bipolar Disorder: Care Instructions. \"     If you do not have an account, please click on the \"Sign Up Now\" link. Current as of: January 31, 2020               Content Version: 12.6  © 2528-4362 [employer]. Care instructions adapted under license by [facility] ([facility]). If you have questions about a medical condition or this instruction, always ask your healthcare professional. [place] [address] any warranty or liability for your use of this information.

## 2020-10-12 NOTE — PROGRESS NOTES
[address]  [place], [address]  Phone [phone]   Fax [phone]      OFFICE VISIT: 10/12/2020    Papa Kam- : 1985      Reason For Visit:  Orlando Hernandez is a 29 y.o. [employer] [employer] is here for Follow-up (for mood and behavior)         Health Maintenance     HPI    Patient is here for mood follow up and behavior    Patient has failed numerous medications  He needs a PA  He is currently  On haldol and lamictal   And klonipin and seroquel  He was not able to get the vraylar at present      He reports that taking the haldol 2mg three times a day  And notes to have some shaking an tremor at times  Seems to get worse    Anxiety  He has been taking klonipin twice a day  It is helpful to work and function  But he has to have to to function at present  *60  He still has the same job this month  [employer]    Controlled Substance Monitoring:    Acute and Chronic Pain Monitoring:   RX Monitoring 10/12/2020   Attestation The Prescription Monitoring Report for this patient was reviewed today. Periodic Controlled Substance Monitoring Possible medication side effects, risk of tolerance/dependence & alternative treatments discussed. ;No signs of potential drug abuse or diversion identified.;Obtaining appropriate analgesic effect of treatment. Chronic Pain > 50 MEDD -           Sleep   Reports seroquel at bedtime  And will get 6 plus hours    Mood stabilizer  On lamictal 200mg tid  He notes it helps manage  But still some paranoid    In past failed seroquel alone  Thorazine and depakote not work  remeron with ED and repationship issues             height is 5' 8\" (1.727 m) and weight is 211 lb (95.7 kg). His temporal temperature is 97.3 °F (36.3 °C). His blood pressure is 114/82 and his pulse is 83. His oxygen saturation is 97%. Body mass index is 32.08 kg/m².     Results for orders placed or performed in visit on 20   POCT Rapid Drug Screen   Result Value Ref Range    Amphetamine Screen, Urine neg     Barbiturate Screen, Urine neg     Benzodiazepine Screen, Urine neg     Buprenorphine Urine neg     Cocaine Metabolite Screen, Urine neg     Gabapentin Screen, Urine neg     MDMA, Urine neg     Methamphetamine, Urine neg     Methadone Screen, Urine      Opiate Scrn, Ur neg     Oxycodone Screen, Ur neg     PCP Screen, Urine      Propoxyphene Screen, Urine      THC Screen, Urine neg     Tricyclic Antidepressants, Urine         I have reviewed the following with the Mr. Shazia Yarbrough   Lab Review   Office Visit on 06/29/2020   Component Date Value    Amphetamine Screen, Urine 06/29/2020 neg     Barbiturate Screen, Urine 06/29/2020 neg     Benzodiazepine Screen, U* 06/29/2020 neg     Buprenorphine Urine 06/29/2020 neg     Cocaine Metabolite Scree* 06/29/2020 neg     Gabapentin Screen, Urine 06/29/2020 neg     MDMA, Urine 06/29/2020 neg     Methamphetamine, Urine 06/29/2020 neg     Opiate Scrn, Ur 06/29/2020 neg     Oxycodone Screen, Ur 06/29/2020 neg     THC Screen, Urine 06/29/2020 neg      Copies of these are in the chart. Prior to Visit Medications    Medication Sig Taking? Authorizing Provider   cariprazine hcl (VRAYLAR) 3 MG CAPS capsule Take 1 capsule by mouth daily Yes RADHA Wayne   clonazePAM (KLONOPIN) 1 MG tablet Take 1 tablet by mouth 2 times daily as needed for Anxiety for up to 30 days.  Yes RADHA Wayne   QUEtiapine (SEROQUEL) 300 MG tablet Take 1 tablet by mouth nightly Yes RADHA Wayne   haloperidol (HALDOL) 2 MG tablet Take 1 tablet by mouth 3 times daily Yes RADHA Wayne   lamoTRIgine (LAMICTAL) 100 MG tablet Take 2 tablets by mouth 2 times daily Yes RADHA Wayne   famotidine (PEPCID) 40 MG tablet Take 1 tablet by mouth 2 times daily Yes RADHA Wayne   ibuprofen (ADVIL;MOTRIN) 800 MG tablet Take 1 tablet by mouth 3 times daily (with meals) Yes RADHA Wayne   sildenafil (VIAGRA) 100 MG tablet Take 1 tablet by mouth daily as needed for Erectile Dysfunction Yes Susann Gitelman, APRN   clonazePAM (KLONOPIN) 1 MG tablet Take 1 tablet by mouth 2 times daily as needed for Anxiety for up to 30 days. Susann Gitelman, APRN       Allergies: Tetracyclines & related    Past Medical History:   Diagnosis Date    Depression        History reviewed. No pertinent surgical history. Social History     Tobacco Use    Smoking status: Former Smoker    Smokeless tobacco: Never Used   Substance Use Topics    Alcohol use: Not Currently       Review of Systems   Constitutional: Negative for activity change, appetite change, fatigue and fever. HENT: Negative for congestion, postnasal drip and trouble swallowing. Eyes: Negative. Psychiatric/Behavioral: Positive for agitation (still present but improving). Negative for decreased concentration, self-injury, sleep disturbance (improved on seroquel nightly) and suicidal ideas. The patient is not nervous/anxious. Paranoid and antisocial behavior         Physical Exam  Vitals signs reviewed. Constitutional:       General: He is not in acute distress. Appearance: Normal appearance. He is not ill-appearing. HENT:      Head: Normocephalic. Right Ear: Tympanic membrane normal.      Left Ear: Tympanic membrane normal.   Cardiovascular:      Rate and Rhythm: Normal rate and regular rhythm. Heart sounds: No murmur. Pulmonary:      Effort: Pulmonary effort is normal.      Breath sounds: Normal breath sounds. Musculoskeletal:         General: No tenderness. Skin:     Findings: No rash. Neurological:      Mental Status: He is alert and oriented to person, place, and time. Psychiatric:         Mood and Affect: Mood normal.         Behavior: Behavior normal.      Comments: Improving at present           ASSESSMENT/ PLAN    1.  Bipolar 1 disorder ([address]  Cont present  Will try to change  As has side effects  Noted to have some shaking and movement  Will need to try vraylar  - cariprazine hcl (VRAYLAR) 3 MG CAPS capsule; Take 1 capsule by mouth daily  Dispense: 30 capsule; Refill: 11  - clonazePAM (KLONOPIN) 1 MG tablet; Take 1 tablet by mouth 2 times daily as needed for Anxiety for up to 30 days. Dispense: 60 tablet; Refill: 0  - QUEtiapine (SEROQUEL) 300 MG tablet; Take 1 tablet by mouth nightly  Dispense: 30 tablet; Refill: 5  - haloperidol (HALDOL) 2 MG tablet; Take 1 tablet by mouth 3 times daily  Dispense: 90 tablet; Refill: 5  - lamoTRIgine (LAMICTAL) 100 MG tablet; Take 2 tablets by mouth 2 times daily  Dispense: 60 tablet; Refill: 5    2. Persistent mood (affective) disorder, unspecified (HCC)  Cont at present  Monitor sleep  - cariprazine hcl (VRAYLAR) 3 MG CAPS capsule; Take 1 capsule by mouth daily  Dispense: 30 capsule; Refill: 11  - clonazePAM (KLONOPIN) 1 MG tablet; Take 1 tablet by mouth 2 times daily as needed for Anxiety for up to 30 days. Dispense: 60 tablet; Refill: 0  - QUEtiapine (SEROQUEL) 300 MG tablet; Take 1 tablet by mouth nightly  Dispense: 30 tablet; Refill: 5  - haloperidol (HALDOL) 2 MG tablet; Take 1 tablet by mouth 3 times daily  Dispense: 90 tablet; Refill: 5  - lamoTRIgine (LAMICTAL) 100 MG tablet; Take 2 tablets by mouth 2 times daily  Dispense: 60 tablet; Refill: 5    3. Paranoid behavior ([address]  Will monitor    - cariprazine hcl (VRAYLAR) 3 MG CAPS capsule; Take 1 capsule by mouth daily  Dispense: 30 capsule; Refill: 11  - clonazePAM (KLONOPIN) 1 MG tablet; Take 1 tablet by mouth 2 times daily as needed for Anxiety for up to 30 days. Dispense: 60 tablet; Refill: 0    4. Atypical stereotyped movement disorder  Will need to screen  - cariprazine hcl (VRAYLAR) 3 MG CAPS capsule; Take 1 capsule by mouth daily  Dispense: 30 capsule; Refill: 11  - clonazePAM (KLONOPIN) 1 MG tablet; Take 1 tablet by mouth 2 times daily as needed for Anxiety for up to 30 days. Dispense: 60 tablet; Refill: 0    5.  Gastroesophageal reflux disease without esophagitis  Cont present  - famotidine (PEPCID) 40 MG tablet; Take 1 tablet by mouth 2 times daily  Dispense: 60 tablet; Refill: 5    6. Pain of foot, unspecified laterality  stable  - ibuprofen (ADVIL;MOTRIN) 800 MG tablet; Take 1 tablet by mouth 3 times daily (with meals)  Dispense: 90 tablet; Refill: 5      No orders of the defined types were placed in this encounter. Return in about 1 month (around 11/12/2020) for anxiety and mood vraylar follow up . Patient Instructions       Patient Education        Bipolar Disorder: Care Instructions  Your Care Instructions     Bipolar disorder is an illness that causes extreme mood changes, from times of very high energy (manic episodes) to times of depression. But many people with bipolar disorder show only the symptoms of depression. These moods may cause problems with your work, school, family life, friendships, and how well you function. This disease is also called manic-depression. There is no cure for bipolar disorder, but it can be helped with medicines. Counseling may also help. It is important to take your medicines exactly as prescribed, even when you feel well. You may need lifelong treatment. Follow-up care is a key part of your treatment and safety. Be sure to make and go to all appointments, and call your doctor if you are having problems. It's also a good idea to know your test results and keep a list of the medicines you take. How can you care for yourself at home? · Be safe with medicines. Take your medicines exactly as prescribed. Do not stop or change a medicine without talking to your doctor first. Yony Johnson and your doctor may need to try different combinations of medicines to find what works for you. · Take your medicines on schedule to keep your moods even. When you feel good, you may think that you do not need your medicines. But it is important to keep taking them. · Go to your counseling sessions.  Call and talk with your counselor if you can't go to a session or if you don't think the sessions are helping. Do not just stop going. · Get at least 30 minutes of activity on most days of the week. Walking is a good choice. You also may want to do other things, such as running, swimming, or cycling. · Get enough sleep. Keep your room dark and quiet. Try to go to bed at the same time every night. · Eat a healthy diet. This includes whole grains, dairy, fruits, vegetables, and protein. Eat foods from each of these groups. · Try to lower your stress. Manage your time, build a strong support system, and lead a healthy lifestyle. To lower your stress, try physical activity, slow deep breathing, or getting a massage. · Do not use alcohol, marijuana, or illegal drugs. · Learn the early signs of your mood changes. You can then take steps to help yourself feel better. · Ask for help from friends and family when you need it. You may need help with daily chores when you are depressed. When you are manic, you may need support to control your high energy levels. What should you do if someone in your family has bipolar disorder? · Learn about the disease and signs it's getting worse. · Remind your family member you love them. · Make a plan with all family members about how to take care of your loved one when symptoms are bad. · Remind yourself it will take time for changes to occur. · Try not to blame yourself for the disease. · Know your legal rights and the legal rights of your family member. Support groups or counselors can help with this information. · Take care of yourself. Keep up with your interests, such as career, hobbies, and friends. Use exercise, positive self-talk, deep breathing, and other relaxing exercises to help lower your stress. · Give yourself time to grieve. You may need to deal with emotions such as anger, fear, and frustration.   · If you are having a hard time with your feelings or with your relationship with your family member, talk with a counselor. When should you call for help? Call 911 anytime you think you may need emergency care. For example, call if:    · You feel like hurting yourself or someone else.     · Someone who has bipolar disorder displays dangerous behavior, and you think the person might hurt himself or herself or someone else. Call your doctor now or seek immediate medical care if:    · You hear voices.     · Someone you know has bipolar disorder and talks about suicide. Keep the numbers for these national suicide hotlines: 9-111-642-TALK (0-590.697.7873) and 7-321-KHWKIVS (9-165.689.1693). If a suicide threat seems real, with a specific plan and a way to carry it out, stay with the person, or ask someone you trust to stay with the person, until you can get help.     · Someone you know has bipolar disorder and:  ? Starts to give away possessions. ? Is using illegal drugs or drinking alcohol heavily. ? Talks or writes about death, including writing suicide notes or talking about guns, knives, or pills. ? Talks or writes about hurting someone else. ? Starts to spend a lot of time alone. ? Acts very aggressively or suddenly appears calm. ? Talks about beliefs that are not based in reality (delusions). Watch closely for changes in your health, and be sure to contact your doctor if:    · You cannot go to your counseling sessions. Where can you learn more? Go to https://Kids360peluizaewdominic.ConcernTrak. org and sign in to your Fishki account. Enter K052 in the [facility] box to learn more about \"Bipolar Disorder: Care Instructions. \"     If you do not have an account, please click on the \"Sign Up Now\" link. Current as of: January 31, 2020               Content Version: 12.6  © 6724-6160 [employer]. Care instructions adapted under license by [facility] ([facility]).  If you have questions about a medical condition or this instruction, always ask your healthcare professional. [employer] disclaims any warranty or liability for your use of this information. Controlled Substances Monitoring:     Attestation: The Prescription Monitoring Report for this patient was reviewed today. RADHA Wayne)  Periodic Controlled Substance Monitoring: Possible medication side effects, risk of tolerance/dependence & alternative treatments discussed., No signs of potential drug abuse or diversion identified., Obtaining appropriate analgesic effect of treatment. RADHA Wayne)            Additional Instructions: As always, patient is Kelly Green bring in medication bottles in order to correctly reconcile with our current list.      Orlando Hernandez received counseling on the following healthy behaviors: none    Patient giveneducational materials on plan of care    I have instructed Orlando Hernandez to complete a self tracking handout on none and instructed them to bring it with them to his next appointment. Discussed use, benefit, and side effects of prescribed medications. Barriers to medication compliance addressed. All patient questions answered. Pt voiced understanding.      RADHA Wayne

## 2020-10-14 ENCOUNTER — TELEPHONE (OUTPATIENT)
Dept: PRIMARY CARE CLINIC | Age: 35
End: 2020-10-14

## 2020-10-14 NOTE — TELEPHONE ENCOUNTER
Received a denial from [facility] on pts Vraylar. This medication cannot be approved because the drug you asked for is non-formulary. Before the drug can be covered, we need more information. Please ask your prescriber to explain to [facility] why the preferred drugs, olanzapine tablet, ziprasidone capsule, aripiprazole tablet AND risperidone tablet have not worked for your medical condition and/or would have bad side effects. Documentation will need to be provided to [facility] regarding treatment with these drugs. This can be appealed by calling [phone]. I will send this to provider for further recommendations.

## 2020-10-28 ENCOUNTER — TELEPHONE (OUTPATIENT)
Dept: PRIMARY CARE CLINIC | Age: 35
End: 2020-10-28

## 2020-10-29 ENCOUNTER — VIRTUAL VISIT (OUTPATIENT)
Dept: PRIMARY CARE CLINIC | Age: 35
End: 2020-10-29
Payer: MEDICAID

## 2020-10-29 PROCEDURE — 99213 OFFICE O/P EST LOW 20 MIN: CPT | Performed by: NURSE PRACTITIONER

## 2020-10-29 RX ORDER — HALOPERIDOL 2 MG/1
2 TABLET ORAL 2 TIMES DAILY
Qty: 60 TABLET | Refills: 5 | Status: SHIPPED | OUTPATIENT
Start: 2020-10-29 | End: 2020-12-14 | Stop reason: SDUPTHER

## 2020-10-29 RX ORDER — BENZTROPINE MESYLATE 1 MG/1
1 TABLET ORAL 2 TIMES DAILY
Qty: 60 TABLET | Refills: 3 | Status: SHIPPED | OUTPATIENT
Start: 2020-10-29 | End: 2020-12-14 | Stop reason: SDUPTHER

## 2020-10-29 ASSESSMENT — ENCOUNTER SYMPTOMS
EYES NEGATIVE: 1
TROUBLE SWALLOWING: 0

## 2020-10-29 NOTE — PATIENT INSTRUCTIONS
Patient Education        benztropine  Pronunciation:  CECELIA russellcesar bloom  Brand:  Laura  What is the most important information I should know about benztropine? Benztropine is not approved for use by anyone younger than 1years old. Certain side effects of benztropine may be more likely in older adults. What is benztropine? Benztropine reduces the effects of certain chemicals in the body that may be unbalanced as a result of disease (such as Parkinson's), drug therapy, or other causes. Benztropine is used together with other medicines to treat the symptoms of Parkinson's disease (muscle spasms, stiffness, tremors, poor muscle control). Benztropine is also used to treat and prevent these symptoms when they are caused by drugs such as chlorpromazine (Thorazine), fluphenazine (Prolixin), perphenazine (Trilafon), and others. Benztropine may also be used for purposes not listed in this medication guide. What should I discuss with my healthcare provider before taking benztropine? You should not use benztropine if you are allergic to it. Benztropine is not approved for use by anyone younger than 1years old. To make sure benztropine is safe for you, tell your doctor if you have:  · glaucoma;  · mental illness;  · a nerve-muscle disorder;  · a history of alcoholism;  · a bowel or bladder obstruction;  · urination problems;  · kidney disease; or  · if you are severely ill or otherwise debilitated. Using benztropine during pregnancy could harm the unborn baby. Tell your doctor if you are pregnant or if you become pregnant while using this medicine. It is not known whether benztropine passes into breast milk or if it could harm a nursing baby. Tell your doctor if you are breast-feeding a baby. How should I take benztropine? Follow all directions on your prescription label. Your doctor may occasionally change your dose to make sure you get the best results.  Do not use this medicine in larger or smaller amounts or for longer than recommended. It is best to take benztropine at bedtime, especially if you take this medicine only once per day. Follow your doctor's instructions. It may take up to 3 days before your symptoms improve. Keep using the medication as directed and tell your doctor if your symptoms do not improve or if they get worse. Your mouth may feel dry while taking benztropine. To prevent or relieve dry mouth, suck on a piece of sugar-free hard candy, chew sugar-free gum, drink water, chew on ice chips, or use a saliva substitute. Drink plenty of liquids while you are taking benztropine. Dry mouth may lead to gum disease or cavities. Brush and floss your teeth regularly and see a dentist for routine check-ups while you are taking benztropine. You should not stop using any of your anti-Parkinson medications suddenly. Stopping suddenly may make your condition worse. Store at room temperature away from moisture, heat, and light. What happens if I miss a dose? Take the missed dose as soon as you remember. Skip the missed dose if it is almost time for your next scheduled dose. Do not take extra medicine to make up the missed dose. What happens if I overdose? Seek emergency medical attention or call the Poison Help line at 1-297.486.1983. Overdose can cause headache, severe dizziness, anxiety, confusion, trouble swallowing, hot and dry skin, dilated pupils, weak pulse, irregular heartbeats, fainting, or seizure (convulsions). What should I avoid while taking benztropine? This medicine may impair your thinking or reactions. Be careful if you drive or do anything that requires you to be alert. Avoid becoming overheated or dehydrated during exercise and in hot weather. Benztropine can decrease sweating and you may be more prone to heat stroke. If you also take ketoconazole, do not take it within 2 hours before you take benztropine. Drinking alcohol with benztropine can cause side effects.   What are the possible side effects of benztropine? Get emergency medical help if you have signs of an allergic reaction: hives; difficulty breathing; swelling of your face, lips, tongue, or throat. Call your doctor at once if you have:  · fast or pounding heartbeats;  · confusion, hallucinations;  · severe mouth dryness that causes trouble talking or swallowing;  · loss of appetite, weight loss;  · severe constipation;  · little or no urination;  · blurred vision, tunnel vision, eye pain, or seeing halos around lights;  · severe skin rash;  · fever, severe weakness or dizziness; or  · dehydration symptoms --feeling very thirsty or hot, being unable to urinate, heavy sweating, or hot and dry skin. Side effects such as dry mouth, constipation, and confusion may be more likely in older adults. Common side effects may include:  · nausea, vomiting;  · dry mouth;  · blurred vision; or  · your eyes may be more sensitive to light. This is not a complete list of side effects and others may occur. Call your doctor for medical advice about side effects. You may report side effects to FDA at 7-323-FDA-2927. What other drugs will affect benztropine? Taking this medicine with other drugs that make you sleepy can worsen this effect. Ask your doctor before taking benztropine with a sleeping pill, narcotic pain medicine, muscle relaxer, or medicine for anxiety, depression, or seizures.   Tell your doctor about all your current medicines and any you start or stop using, especially:  · haloperidol;  · a potassium supplement;  · cold or allergy medicine that contains an antihistamine;  · other medications for Parkinson's disease;  · medication to treat excess stomach acid, stomach ulcer, motion sickness, or irritable bowel syndrome;  · an antidepressant --amitriptyline, doxepin, clomipramine, desipramine, imipramine, nortriptyline, protriptyline, trimipramine;  · bladder or urinary medicine --darifenacin, fesoterodine, oxybutynin, tolterodine, solifenacin;  · a bronchodilator --aclidinium, ipratropium, tiotropium, umeclidinium; or  · a phenothiazine --chlorpromazine, fluphenazine, perphenazine, prochlorperazine, promethazine, thioridazine, trifluoperazine. This list is not complete. Other drugs may interact with benztropine, including prescription and over-the-counter medicines, vitamins, and herbal products. Not all possible interactions are listed in this medication guide. Where can I get more information? Your pharmacist can provide more information about benztropine. Remember, keep this and all other medicines out of the reach of children, never share your medicines with others, and use this medication only for the indication prescribed. Every effort has been made to ensure that the information provided by [address] [address] is accurate, up-to-date, and complete, but no guarantee is made to that effect. Drug information contained herein may be time sensitive. [facility] information has been compiled for use by healthcare practitioners and consumers in the [place] and therefore [facility] does not warrant that uses outside of the [place] are appropriate, unless specifically indicated otherwise. [facility]'s drug information does not endorse drugs, diagnose patients or recommend therapy. [facility] drug information is an informational resource designed to assist licensed healthcare practitioners in caring for their patients and/or to serve consumers viewing this service as a supplement to, and not a substitute for, the expertise, skill, knowledge and judgment of healthcare practitioners. The absence of a warning for a given drug or drug combination in no way should be construed to indicate that the drug or drug combination is safe, effective or appropriate for any given patient. [facility] does not assume any responsibility for any aspect of healthcare administered with the aid of information [facility] provides.  The information contained herein is not intended to cover all possible uses, directions, precautions, warnings, drug interactions, allergic reactions, or adverse effects. If you have questions about the drugs you are taking, check with your doctor, nurse or pharmacist.  Copyright 4613-8268 [address]. Version: 8.01. Revision date: 2/25/2016. Care instructions adapted under license by [facility] [place] ([facility]). If you have questions about a medical condition or this instruction, always ask your healthcare professional. [place] [address] any warranty or liability for your use of this information.

## 2020-10-29 NOTE — PROGRESS NOTES
[address]  [place], [address]  Phone [phone]   Fax [phone]      OFFICE VISIT: 10/29/2020    Emerald Garcia- : 1985      Reason For Visit:  Mona Medina is a 29 y.o. Carley Paulino is here for Medication Problem (haldol)         Health Maintenance     HPI    Patient is on video  To discuss haldol  He reports he did a trial of the vraylar    Reports that took vraylar  Gary so edge wasn't doing well  So he went back to haldol twice a day  And was much better again    He reports after restarting shaking again  But also notes his mother and aunt both does it too  He cant recall if he was on the vraylar         vitals were not taken for this visit. There is no height or weight on file to calculate BMI. Results for orders placed or performed in visit on 20   POCT Rapid Drug Screen   Result Value Ref Range    Amphetamine Screen, Urine neg     Barbiturate Screen, Urine neg     Benzodiazepine Screen, Urine neg     Buprenorphine Urine neg     Cocaine Metabolite Screen, Urine neg     Gabapentin Screen, Urine neg     MDMA, Urine neg     Methamphetamine, Urine neg     Methadone Screen, Urine      Opiate Scrn, Ur neg     Oxycodone Screen, Ur neg     PCP Screen, Urine      Propoxyphene Screen, Urine      THC Screen, Urine neg     Tricyclic Antidepressants, Urine         I have reviewed the following with the Mr. Raleigh Gonzales   Lab Review   Office Visit on 2020   Component Date Value    Amphetamine Screen, Urine 2020 neg     Barbiturate Screen, Urine 2020 neg     Benzodiazepine Screen, U* 2020 neg     Buprenorphine Urine 2020 neg     Cocaine Metabolite Scree* 2020 neg     Gabapentin Screen, Urine 2020 neg     MDMA, Urine 2020 neg     Methamphetamine, Urine 2020 neg     Opiate Scrn, Ur 2020 neg     Oxycodone Screen, Ur 2020 neg     THC Screen, Urine 2020 neg      Copies of these are in the chart.     Prior to Visit Medications Medication Sig Taking? Authorizing Provider   haloperidol (HALDOL) 2 MG tablet Take 1 tablet by mouth 2 times daily Yes RADHA Fong   benztropine (COGENTIN) 1 MG tablet Take 1 tablet by mouth 2 times daily Yes RADHA Fong   clonazePAM (KLONOPIN) 1 MG tablet Take 1 tablet by mouth 2 times daily as needed for Anxiety for up to 30 days. RADHA Fong   QUEtiapine (SEROQUEL) 300 MG tablet Take 1 tablet by mouth nightly  RADHA Fong   lamoTRIgine (LAMICTAL) 100 MG tablet Take 2 tablets by mouth 2 times daily  RADHA Fong   famotidine (PEPCID) 40 MG tablet Take 1 tablet by mouth 2 times daily  RADAH Fong   ibuprofen (ADVIL;MOTRIN) 800 MG tablet Take 1 tablet by mouth 3 times daily (with meals)  RADHA Fong   clonazePAM (KLONOPIN) 1 MG tablet Take 1 tablet by mouth 2 times daily as needed for Anxiety for up to 30 days. RADHA Fong   sildenafil (VIAGRA) 100 MG tablet Take 1 tablet by mouth daily as needed for Erectile Dysfunction  RADHA Fong       Allergies: Tetracyclines & related    Past Medical History:   Diagnosis Date    Depression        No past surgical history on file. Social History     Tobacco Use    Smoking status: Former Smoker    Smokeless tobacco: Never Used   Substance Use Topics    Alcohol use: Not Currently       Review of Systems   Constitutional: Negative for activity change, appetite change, fatigue and fever. HENT: Negative for congestion, postnasal drip and trouble swallowing. Eyes: Negative. Psychiatric/Behavioral: Positive for agitation (still present but improved after return to haldol). Negative for decreased concentration, self-injury, sleep disturbance (improved on seroquel nightly) and suicidal ideas. The patient is not nervous/anxious. Paranoid and antisocial behavior improved           Physical Exam  Vitals signs reviewed.    Constitutional:       General: He is not in acute distress. Appearance: Normal appearance. He is not ill-appearing. HENT:      Head: Normocephalic. Right Ear: Tympanic membrane normal.      Left Ear: Tympanic membrane normal.   Cardiovascular:      Rate and Rhythm: Normal rate and regular rhythm. Heart sounds: No murmur. Pulmonary:      Effort: Pulmonary effort is normal.      Breath sounds: Normal breath sounds. Musculoskeletal:         General: No tenderness. Skin:     Findings: No rash. Neurological:      Mental Status: He is alert and oriented to person, place, and time. Psychiatric:         Mood and Affect: Mood normal.         Behavior: Behavior normal.      Comments: Improving at present           ASSESSMENT/ PLAN    1. Persistent mood (affective) disorder, unspecified ([facility] [address]  Will return to haldol  As helpful and been through many alternatives    - haloperidol (HALDOL) 2 MG tablet; Take 1 tablet by mouth 2 times daily  Dispense: 60 tablet; Refill: 5  - benztropine (COGENTIN) 1 MG tablet; Take 1 tablet by mouth 2 times daily  Dispense: 60 tablet; Refill: 3    2. Bipolar 1 disorder (HCC)  Cont present as at goal  - haloperidol (HALDOL) 2 MG tablet; Take 1 tablet by mouth 2 times daily  Dispense: 60 tablet; Refill: 5  - benztropine (COGENTIN) 1 MG tablet; Take 1 tablet by mouth 2 times daily  Dispense: 60 tablet; Refill: 3    3. Extrapyramidal movement disorder  Will start 1/2-1 twice a day  Discussed if not improved will consider familial shaking   And change to beta blocker     - benztropine (COGENTIN) 1 MG tablet; Take 1 tablet by mouth 2 times daily  Dispense: 60 tablet; Refill: 3      No orders of the defined types were placed in this encounter. Return if symptoms worsen or fail to improve. Patient Instructions     Patient Education        benztropine  Pronunciation:  CECELIA troe peen  Brand:  Cogentin  What is the most important information I should know about benztropine?   Benztropine is not approved for use by anyone younger than 1years old. Certain side effects of benztropine may be more likely in older adults. What is benztropine? Benztropine reduces the effects of certain chemicals in the body that may be unbalanced as a result of disease (such as Parkinson's), drug therapy, or other causes. Benztropine is used together with other medicines to treat the symptoms of Parkinson's disease (muscle spasms, stiffness, tremors, poor muscle control). Benztropine is also used to treat and prevent these symptoms when they are caused by drugs such as chlorpromazine (Thorazine), fluphenazine (Prolixin), perphenazine (Trilafon), and others. Benztropine may also be used for purposes not listed in this medication guide. What should I discuss with my healthcare provider before taking benztropine? You should not use benztropine if you are allergic to it. Benztropine is not approved for use by anyone younger than 1years old. To make sure benztropine is safe for you, tell your doctor if you have:  · glaucoma;  · mental illness;  · a nerve-muscle disorder;  · a history of alcoholism;  · a bowel or bladder obstruction;  · urination problems;  · kidney disease; or  · if you are severely ill or otherwise debilitated. Using benztropine during pregnancy could harm the unborn baby. Tell your doctor if you are pregnant or if you become pregnant while using this medicine. It is not known whether benztropine passes into breast milk or if it could harm a nursing baby. Tell your doctor if you are breast-feeding a baby. How should I take benztropine? Follow all directions on your prescription label. Your doctor may occasionally change your dose to make sure you get the best results. Do not use this medicine in larger or smaller amounts or for longer than recommended. It is best to take benztropine at bedtime, especially if you take this medicine only once per day. Follow your doctor's instructions.   It may take up to 3 days before your symptoms improve. Keep using the medication as directed and tell your doctor if your symptoms do not improve or if they get worse. Your mouth may feel dry while taking benztropine. To prevent or relieve dry mouth, suck on a piece of sugar-free hard candy, chew sugar-free gum, drink water, chew on ice chips, or use a saliva substitute. Drink plenty of liquids while you are taking benztropine. Dry mouth may lead to gum disease or cavities. Brush and floss your teeth regularly and see a dentist for routine check-ups while you are taking benztropine. You should not stop using any of your anti-Parkinson medications suddenly. Stopping suddenly may make your condition worse. Store at room temperature away from moisture, heat, and light. What happens if I miss a dose? Take the missed dose as soon as you remember. Skip the missed dose if it is almost time for your next scheduled dose. Do not take extra medicine to make up the missed dose. What happens if I overdose? Seek emergency medical attention or call the Poison Help line at 1-386.753.9923. Overdose can cause headache, severe dizziness, anxiety, confusion, trouble swallowing, hot and dry skin, dilated pupils, weak pulse, irregular heartbeats, fainting, or seizure (convulsions). What should I avoid while taking benztropine? This medicine may impair your thinking or reactions. Be careful if you drive or do anything that requires you to be alert. Avoid becoming overheated or dehydrated during exercise and in hot weather. Benztropine can decrease sweating and you may be more prone to heat stroke. If you also take ketoconazole, do not take it within 2 hours before you take benztropine. Drinking alcohol with benztropine can cause side effects. What are the possible side effects of benztropine? Get emergency medical help if you have signs of an allergic reaction: hives; difficulty breathing; swelling of your face, lips, tongue, or throat.   Call your doctor at once if you have:  · fast or pounding heartbeats;  · confusion, hallucinations;  · severe mouth dryness that causes trouble talking or swallowing;  · loss of appetite, weight loss;  · severe constipation;  · little or no urination;  · blurred vision, tunnel vision, eye pain, or seeing halos around lights;  · severe skin rash;  · fever, severe weakness or dizziness; or  · dehydration symptoms --feeling very thirsty or hot, being unable to urinate, heavy sweating, or hot and dry skin. Side effects such as dry mouth, constipation, and confusion may be more likely in older adults. Common side effects may include:  · nausea, vomiting;  · dry mouth;  · blurred vision; or  · your eyes may be more sensitive to light. This is not a complete list of side effects and others may occur. Call your doctor for medical advice about side effects. You may report side effects to FDA at 1-919-FDA-3297. What other drugs will affect benztropine? Taking this medicine with other drugs that make you sleepy can worsen this effect. Ask your doctor before taking benztropine with a sleeping pill, narcotic pain medicine, muscle relaxer, or medicine for anxiety, depression, or seizures.   Tell your doctor about all your current medicines and any you start or stop using, especially:  · haloperidol;  · a potassium supplement;  · cold or allergy medicine that contains an antihistamine;  · other medications for Parkinson's disease;  · medication to treat excess stomach acid, stomach ulcer, motion sickness, or irritable bowel syndrome;  · an antidepressant --amitriptyline, doxepin, clomipramine, desipramine, imipramine, nortriptyline, protriptyline, trimipramine;  · bladder or urinary medicine --darifenacin, fesoterodine, oxybutynin, tolterodine, solifenacin;  · a bronchodilator --aclidinium, ipratropium, tiotropium, umeclidinium; or  · a phenothiazine --chlorpromazine, fluphenazine, perphenazine, prochlorperazine, promethazine, thioridazine, trifluoperazine. This list is not complete. Other drugs may interact with benztropine, including prescription and over-the-counter medicines, vitamins, and herbal products. Not all possible interactions are listed in this medication guide. Where can I get more information? Your pharmacist can provide more information about benztropine. Remember, keep this and all other medicines out of the reach of children, never share your medicines with others, and use this medication only for the indication prescribed. Every effort has been made to ensure that the information provided by [address] is accurate, up-to-date, and complete, but no guarantee is made to that effect. Drug information contained herein may be time sensitive. [facility] information has been compiled for use by healthcare practitioners and consumers in the [place] and therefore [facility] does not warrant that uses outside of the [place] are appropriate, unless specifically indicated otherwise. [facility]'s drug information does not endorse drugs, diagnose patients or recommend therapy. [facility] drug information is an informational resource designed to assist licensed healthcare practitioners in caring for their patients and/or to serve consumers viewing this service as a supplement to, and not a substitute for, the expertise, skill, knowledge and judgment of healthcare practitioners. The absence of a warning for a given drug or drug combination in no way should be construed to indicate that the drug or drug combination is safe, effective or appropriate for any given patient. [facility] does not assume any responsibility for any aspect of healthcare administered with the aid of information [facility] provides. The information contained herein is not intended to cover all possible uses, directions, precautions, warnings, drug interactions, allergic reactions, or adverse effects.  If you have questions about the drugs you are taking, check with your doctor, nurse or pharmacist.  Copyright 5959-0295 [address]: 8.01. Revision date: 2/25/2016. Care instructions adapted under license by [facility] ([facility]). If you have questions about a medical condition or this instruction, always ask your healthcare professional. [place] [address] any warranty or liability for your use of this information. Controlled Substances Monitoring: Additional Instructions: As always, patient is advisedto bring in medication bottles in order to correctly reconcile with our current list.      Ksenia Bolls received counseling on the following healthy behaviors: none    Patient giveneducational materials on plan of care    I have instructed Ksenia King to complete a self tracking handout on none and instructed them to bring it with them to his next appointment. Discussed use, benefit, and side effects of prescribed medications. Barriers to medication compliance addressed. All patient questions answered. Pt voiced understanding.      RADHA Montero

## 2020-11-13 ENCOUNTER — VIRTUAL VISIT (OUTPATIENT)
Dept: PRIMARY CARE CLINIC | Age: 35
End: 2020-11-13
Payer: MEDICAID

## 2020-11-13 PROCEDURE — 99213 OFFICE O/P EST LOW 20 MIN: CPT | Performed by: NURSE PRACTITIONER

## 2020-11-13 RX ORDER — CLONAZEPAM 1 MG/1
1 TABLET ORAL 2 TIMES DAILY PRN
Qty: 60 TABLET | Refills: 0 | Status: SHIPPED | OUTPATIENT
Start: 2020-11-13 | End: 2020-12-11 | Stop reason: SDUPTHER

## 2020-11-13 ASSESSMENT — ENCOUNTER SYMPTOMS
TROUBLE SWALLOWING: 0
EYES NEGATIVE: 1

## 2020-11-13 NOTE — PROGRESS NOTES
[address]  [place], [address]  Phone [phone]   Fax [phone]      OFFICE VISIT: 2020    Emerald Garcia- : 1985      Reason For Visit:  Mona Medina is a 29 y.o. Carleybarrington Paulino is here for 1 Month Follow-Up (mood disorders)         Health Maintenance     HPI    Patient is for follow up on anxiety moods and issues    He been taking haldol mood control  But shaking as side effect  And was started on cogentin with relief of shaking    Reports moods are still paranoid  Has a roommate  And now is worried as makes him paranoid  And talks about strange thinks  And now worse  He is concerned of values    Anxiety  He has been taking klonipin twice a day  It is helpful to work and function  But he has to have to to function at present  10/12 *60  He still has the same job this month  [address] [facility]  Controlled Substance Monitoring:    Acute and Chronic Pain Monitoring:   RX Monitoring 10/12/2020   Attestation The Prescription Monitoring Report for this patient was reviewed today. Periodic Controlled Substance Monitoring Possible medication side effects, risk of tolerance/dependence & alternative treatments discussed. ;No signs of potential drug abuse or diversion identified.;Obtaining appropriate analgesic effect of treatment. Chronic Pain > 50 MEDD -            vitals were not taken for this visit. There is no height or weight on file to calculate BMI.     Results for orders placed or performed in visit on 20   POCT Rapid Drug Screen   Result Value Ref Range    Amphetamine Screen, Urine neg     Barbiturate Screen, Urine neg     Benzodiazepine Screen, Urine neg     Buprenorphine Urine neg     Cocaine Metabolite Screen, Urine neg     Gabapentin Screen, Urine neg     MDMA, Urine neg     Methamphetamine, Urine neg     Methadone Screen, Urine      Opiate Scrn, Ur neg     Oxycodone Screen, Ur neg     PCP Screen, Urine      Propoxyphene Screen, Urine      THC Screen, Urine neg     Tricyclic Antidepressants, Urine         I have reviewed the following with the Mr. Hans Orozco   Lab Review   Office Visit on 06/29/2020   Component Date Value    Amphetamine Screen, Urine 06/29/2020 neg     Barbiturate Screen, Urine 06/29/2020 neg     Benzodiazepine Screen, U* 06/29/2020 neg     Buprenorphine Urine 06/29/2020 neg     Cocaine Metabolite Scree* 06/29/2020 neg     Gabapentin Screen, Urine 06/29/2020 neg     MDMA, Urine 06/29/2020 neg     Methamphetamine, Urine 06/29/2020 neg     Opiate Scrn, Ur 06/29/2020 neg     Oxycodone Screen, Ur 06/29/2020 neg     THC Screen, Urine 06/29/2020 neg      Copies of these are in the chart. Prior to Visit Medications    Medication Sig Taking? Authorizing Provider   clonazePAM (KLONOPIN) 1 MG tablet Take 1 tablet by mouth 2 times daily as needed for Anxiety for up to 30 days. Yes RADHA Polanco   haloperidol (HALDOL) 2 MG tablet Take 1 tablet by mouth 2 times daily Yes RADHA Polanco   benztropine (COGENTIN) 1 MG tablet Take 1 tablet by mouth 2 times daily Yes RADHA Polanco   QUEtiapine (SEROQUEL) 300 MG tablet Take 1 tablet by mouth nightly Yes RADHA Polanco   lamoTRIgine (LAMICTAL) 100 MG tablet Take 2 tablets by mouth 2 times daily Yes RADHA Polanco   famotidine (PEPCID) 40 MG tablet Take 1 tablet by mouth 2 times daily Yes RADHA Polanco   ibuprofen (ADVIL;MOTRIN) 800 MG tablet Take 1 tablet by mouth 3 times daily (with meals) Yes RADHA Polanco   sildenafil (VIAGRA) 100 MG tablet Take 1 tablet by mouth daily as needed for Erectile Dysfunction Yes RADHA Polanco       Allergies: Tetracyclines & related    Past Medical History:   Diagnosis Date    Depression        No past surgical history on file.     Social History     Tobacco Use    Smoking status: Former Smoker    Smokeless tobacco: Never Used   Substance Use Topics    Alcohol use: Not Currently       Review of Systems Constitutional: Negative for activity change, appetite change, fatigue and fever. HENT: Negative for congestion, postnasal drip and trouble swallowing. Eyes: Negative. Neurological:        Shaking improved on medication   Psychiatric/Behavioral: Negative for agitation (stable), decreased concentration, self-injury, sleep disturbance (improved on seroquel nightly) and suicidal ideas. The patient is not nervous/anxious. Paranoid and antisocial behavior improved           Physical Exam  Vitals signs reviewed. Constitutional:       General: He is not in acute distress. Appearance: Normal appearance. He is not ill-appearing. HENT:      Head: Normocephalic. Right Ear: Tympanic membrane normal.      Left Ear: Tympanic membrane normal.   Cardiovascular:      Rate and Rhythm: Normal rate and regular rhythm. Heart sounds: No murmur. Pulmonary:      Effort: Pulmonary effort is normal.      Breath sounds: Normal breath sounds. Musculoskeletal:         General: No tenderness. Skin:     Findings: No rash. Neurological:      Mental Status: He is alert and oriented to person, place, and time. Psychiatric:         Mood and Affect: Mood normal.         Behavior: Behavior normal.      Comments: Improving at present           ASSESSMENT/ PLAN    1. Bipolar 1 disorder (HCC)  Cont haldol and cogentin  - clonazePAM (KLONOPIN) 1 MG tablet; Take 1 tablet by mouth 2 times daily as needed for Anxiety for up to 30 days. Dispense: 60 tablet; Refill: 0    2. Persistent mood (affective) disorder, unspecified (HCC)  Stable at present  - clonazePAM (KLONOPIN) 1 MG tablet; Take 1 tablet by mouth 2 times daily as needed for Anxiety for up to 30 days. Dispense: 60 tablet; Refill: 0    3. Paranoid behavior ([address]  Cont present  - clonazePAM (KLONOPIN) 1 MG tablet; Take 1 tablet by mouth 2 times daily as needed for Anxiety for up to 30 days. Dispense: 60 tablet; Refill: 0    4.  Atypical stereotyped movement disorder  stable  - clonazePAM (KLONOPIN) 1 MG tablet; Take 1 tablet by mouth 2 times daily as needed for Anxiety for up to 30 days. Dispense: 60 tablet; Refill: 0      No orders of the defined types were placed in this encounter. Return in about 31 days (around 12/14/2020) for yearly check up and physical.     Patient Instructions     Patient Education        Learning About Mood Disorders  What are mood disorders? Mood disorders are medical problems that affect how you feel. They can impact your moods, thoughts, and actions. Mood disorders include:  · Depression. This causes you to feel sad or hopeless for much of the time. · Bipolar disorder. This causes extreme mood changes from manic episodes of very high energy to extreme lows of depression. · Seasonal affective disorder (SAD). This is a type of depression that affects you during the same season each year. Most often people experience SAD during the fall and winter months when days are shorter and there is less light. What are the symptoms? Depression  You may:  · Feel sad or hopeless nearly every day. · Lose interest in or not get pleasure from most daily activities. You feel this way nearly every day. · Have low energy, changes in your appetite, or changes in how well you sleep. · Have trouble concentrating. · Think about death and suicide. Keep the numbers for these national suicide hotlines: 3-836-445-TALK (8-385.473.4565) and 4-738-OHYHGIS (3-603.451.2602). If you or someone you know talks about suicide or feeling hopeless, get help right away. Bipolar disorder  Symptoms depend on your mood swings. You may:  · Feel very happy, energetic, or on edge. · Feel like you need very little sleep. · Feel overly self-confident. · Do impulsive things, such as spending a lot of money. · Feel sad or hopeless. · Have racing thoughts or trouble thinking and making decisions.   · Lose interest in things you have enjoyed in the past.  · Think about death and suicide. Keep the numbers for these national suicide hotlines: 7-273-171-TALK (4-155.290.9357) and 2-024-WYUHQZC (1-287.801.6262). If you or someone you know talks about suicide or feeling hopeless, get help right away. Seasonal affective disorder (SAD)  Symptoms come and go at about the same time each year. For most people with SAD, symptoms come during the winter when there is less daylight. You may:  · Feel sad, grumpy, morgan, or anxious. · Lose interest in your usual activities. · Eat more and crave carbohydrates, such as bread and pasta. · Gain weight. · Sleep more and feel drowsy during the daytime. How are mood disorders treated? Mood disorders can be treated with medicines or counseling, or a combination of both. Medicines for depression and SAD may include antidepressants. Medicines for bipolar disorder may include:  · Mood stabilizers. · Antipsychotics. · Benzodiazepines. Counseling may involve cognitive-behavioral therapy. It teaches you how to change the ways you think and behave. This can help you stop thinking bad thoughts about yourself and your life. Light therapy is the main treatment for SAD. This therapy uses a special kind of lamp. You let the lamp shine on you at certain times, usually in the morning. This may help your symptoms during the months when there is less sunlight. Healthy lifestyle  Healthy lifestyle changes may help you feel better. · Be active often. You might try walking or strength training. · Eat a healthy diet. Include fruits, vegetables, lean proteins, and whole grains in your diet each day. · Keep a regular sleep schedule. Try for 8 hours of sleep a night. · Find ways to manage stress, such as relaxation exercises. · Avoid alcohol and illegal drugs. Follow-up care is a key part of your treatment and safety. Be sure to make and go to all appointments, and call your doctor if you are having problems.  It's also a good idea to know your test results and keep a list of the medicines you take. Where can you learn more? Go to [email or web address]://[email or web address].[email or web address] org and sign in to your [employer] account. Enter H334 in the [facility] box to learn more about \"Learning About Mood Disorders. \"     If you do not have an account, please click on the \"Sign Up Now\" link. Current as of: January 31, 2020               Content Version: 12.6  © 2581-8115 [employer]. Care instructions adapted under license by [facility] ([facility]). If you have questions about a medical condition or this instruction, always ask your healthcare professional. [place] [address] any warranty or liability for your use of this information. Controlled Substances Monitoring: Additional Instructions: As always, patient is advisedto bring in medication bottles in order to correctly reconcile with our current list.      Juan Ivy received counseling on the following healthy behaviors: none    Patient giveneducational materials on plan of care    I have instructed Juan Ivy to complete a self tracking handout on blood pressure and instructed them to bring it with them to his next appointment. Discussed use, benefit, and side effects of prescribed medications. Barriers to medication compliance addressed. All patient questions answered. Pt voiced understanding.      RADHA Stewart

## 2020-11-13 NOTE — PATIENT INSTRUCTIONS
Patient Education        Learning About Mood Disorders  What are mood disorders? Mood disorders are medical problems that affect how you feel. They can impact your moods, thoughts, and actions. Mood disorders include:  · Depression. This causes you to feel sad or hopeless for much of the time. · Bipolar disorder. This causes extreme mood changes from manic episodes of very high energy to extreme lows of depression. · Seasonal affective disorder (SAD). This is a type of depression that affects you during the same season each year. Most often people experience SAD during the fall and winter months when days are shorter and there is less light. What are the symptoms? Depression  You may:  · Feel sad or hopeless nearly every day. · Lose interest in or not get pleasure from most daily activities. You feel this way nearly every day. · Have low energy, changes in your appetite, or changes in how well you sleep. · Have trouble concentrating. · Think about death and suicide. Keep the numbers for these national suicide hotlines: 1-098-912-TALK (6-734.477.2430) and 6-199-URIEGZR (6-849.490.5134). If you or someone you know talks about suicide or feeling hopeless, get help right away. Bipolar disorder  Symptoms depend on your mood swings. You may:  · Feel very happy, energetic, or on edge. · Feel like you need very little sleep. · Feel overly self-confident. · Do impulsive things, such as spending a lot of money. · Feel sad or hopeless. · Have racing thoughts or trouble thinking and making decisions. · Lose interest in things you have enjoyed in the past.  · Think about death and suicide. Keep the numbers for these national suicide hotlines: 1-142-437-TALK (8-556.494.5596) and 4-324-PCMODGX (6-762.277.1157). If you or someone you know talks about suicide or feeling hopeless, get help right away. Seasonal affective disorder (SAD)  Symptoms come and go at about the same time each year.  For most people with SAD, symptoms come during the winter when there is less daylight. You may:  · Feel sad, grumpy, morgan, or anxious. · Lose interest in your usual activities. · Eat more and crave carbohydrates, such as bread and pasta. · Gain weight. · Sleep more and feel drowsy during the daytime. How are mood disorders treated? Mood disorders can be treated with medicines or counseling, or a combination of both. Medicines for depression and SAD may include antidepressants. Medicines for bipolar disorder may include:  · Mood stabilizers. · Antipsychotics. · Benzodiazepines. Counseling may involve cognitive-behavioral therapy. It teaches you how to change the ways you think and behave. This can help you stop thinking bad thoughts about yourself and your life. Light therapy is the main treatment for SAD. This therapy uses a special kind of lamp. You let the lamp shine on you at certain times, usually in the morning. This may help your symptoms during the months when there is less sunlight. Healthy lifestyle  Healthy lifestyle changes may help you feel better. · Be active often. You might try walking or strength training. · Eat a healthy diet. Include fruits, vegetables, lean proteins, and whole grains in your diet each day. · Keep a regular sleep schedule. Try for 8 hours of sleep a night. · Find ways to manage stress, such as relaxation exercises. · Avoid alcohol and illegal drugs. Follow-up care is a key part of your treatment and safety. Be sure to make and go to all appointments, and call your doctor if you are having problems. It's also a good idea to know your test results and keep a list of the medicines you take. Where can you learn more? Go to https://mayuri.[employer]. org and sign in to your [employer] account. Enter Y618 in the [employer] box to learn more about \"Learning About Mood Disorders. \"     If you do not have an account, please click on the \"Sign Up Now\" link.   Current as of: January 31, 2020               Content Version: 12.6  © 3750-5390 [employer]. Care instructions adapted under license by [facility] ([facility]). If you have questions about a medical condition or this instruction, always ask your healthcare professional. [address] any warranty or liability for your use of this information.

## 2020-12-10 NOTE — TELEPHONE ENCOUNTER
They know this. She was just stating he will be out of his meds before his appt. Didn't know if he could get enough until his appt.

## 2020-12-10 NOTE — TELEPHONE ENCOUNTER
He is in person for physical and labs and needs to keep apt   I have openings where she can get one anytime next week

## 2020-12-10 NOTE — TELEPHONE ENCOUNTER
Pt called, he wanted me to call and give his wife his appt date and time for next week. His appt is Monday morning. I called wife and she said he will be out of his klonopin Sunday, the 13th.

## 2020-12-11 RX ORDER — CLONAZEPAM 1 MG/1
1 TABLET ORAL 2 TIMES DAILY PRN
Qty: 60 TABLET | Refills: 0 | Status: SHIPPED | OUTPATIENT
Start: 2020-12-11 | End: 2021-01-12 | Stop reason: SDUPTHER

## 2020-12-14 ENCOUNTER — HOSPITAL ENCOUNTER (OUTPATIENT)
Dept: GENERAL RADIOLOGY | Age: 35
Discharge: HOME | End: 2020-12-14
Payer: MEDICAID

## 2020-12-14 ENCOUNTER — OFFICE VISIT (OUTPATIENT)
Dept: PRIMARY CARE CLINIC | Age: 35
End: 2020-12-14
Payer: MEDICAID

## 2020-12-14 ENCOUNTER — TELEPHONE (OUTPATIENT)
Dept: PRIMARY CARE CLINIC | Age: 35
End: 2020-12-14

## 2020-12-14 VITALS
OXYGEN SATURATION: 98 % | HEIGHT: 68 IN | TEMPERATURE: 96.8 F | DIASTOLIC BLOOD PRESSURE: 84 MMHG | HEART RATE: 84 BPM | SYSTOLIC BLOOD PRESSURE: 128 MMHG | BODY MASS INDEX: 32.32 KG/M2 | WEIGHT: 213.25 LBS

## 2020-12-14 DIAGNOSIS — Z00.00 WELL ADULT EXAM: ICD-10-CM

## 2020-12-14 LAB
ALBUMIN SERPL-MCNC: 4.7 G/DL (ref 3.5–5.2)
ALP BLD-CCNC: 111 U/L (ref 40–130)
ALT SERPL-CCNC: 30 U/L (ref 5–41)
ANION GAP SERPL CALCULATED.3IONS-SCNC: 13 MMOL/L (ref 7–19)
AST SERPL-CCNC: 23 U/L (ref 5–40)
BASOPHILS ABSOLUTE: 0.1 K/UL (ref 0–0.2)
BASOPHILS RELATIVE PERCENT: 0.8 % (ref 0–1)
BILIRUB SERPL-MCNC: <0.2 MG/DL (ref 0.2–1.2)
BUN BLDV-MCNC: 15 MG/DL (ref 6–20)
CALCIUM SERPL-MCNC: 9.6 MG/DL (ref 8.6–10)
CHLORIDE BLD-SCNC: 105 MMOL/L (ref 98–111)
CHOLESTEROL, TOTAL: 206 MG/DL (ref 160–199)
CO2: 24 MMOL/L (ref 22–29)
CREAT SERPL-MCNC: 1 MG/DL (ref 0.5–1.2)
CREATININE URINE: 4.2 MG/DL (ref 4.2–622)
EOSINOPHILS ABSOLUTE: 0.4 K/UL (ref 0–0.6)
EOSINOPHILS RELATIVE PERCENT: 4.4 % (ref 0–5)
GFR AFRICAN AMERICAN: >59
GFR NON-AFRICAN AMERICAN: >60
GLUCOSE BLD-MCNC: 89 MG/DL (ref 74–109)
HBA1C MFR BLD: 5.2 % (ref 4–6)
HCT VFR BLD CALC: 45.5 % (ref 42–52)
HDLC SERPL-MCNC: 51 MG/DL (ref 55–121)
HEMOGLOBIN: 15.3 G/DL (ref 14–18)
IMMATURE GRANULOCYTES #: 0 K/UL
LDL CHOLESTEROL CALCULATED: 132 MG/DL
LYMPHOCYTES ABSOLUTE: 2.2 K/UL (ref 1.1–4.5)
LYMPHOCYTES RELATIVE PERCENT: 25.9 % (ref 20–40)
MCH RBC QN AUTO: 31.7 PG (ref 27–31)
MCHC RBC AUTO-ENTMCNC: 33.6 G/DL (ref 33–37)
MCV RBC AUTO: 94.4 FL (ref 80–94)
MICROALBUMIN UR-MCNC: 2 MG/DL (ref 0–19)
MICROALBUMIN/CREAT UR-RTO: 476.2 MG/G
MONOCYTES ABSOLUTE: 0.6 K/UL (ref 0–0.9)
MONOCYTES RELATIVE PERCENT: 7.4 % (ref 0–10)
NEUTROPHILS ABSOLUTE: 5.2 K/UL (ref 1.5–7.5)
NEUTROPHILS RELATIVE PERCENT: 61.1 % (ref 50–65)
PDW BLD-RTO: 11.9 % (ref 11.5–14.5)
PLATELET # BLD: 327 K/UL (ref 130–400)
PMV BLD AUTO: 10 FL (ref 9.4–12.4)
POTASSIUM SERPL-SCNC: 4.4 MMOL/L (ref 3.5–5)
RBC # BLD: 4.82 M/UL (ref 4.7–6.1)
SODIUM BLD-SCNC: 142 MMOL/L (ref 136–145)
T4 FREE: 0.98 NG/DL (ref 0.93–1.7)
TOTAL PROTEIN: 7.1 G/DL (ref 6.6–8.7)
TRIGL SERPL-MCNC: 115 MG/DL (ref 0–149)
TSH SERPL DL<=0.05 MIU/L-ACNC: 0.64 UIU/ML (ref 0.27–4.2)
VITAMIN D 25-HYDROXY: 19.3 NG/ML
WBC # BLD: 8.4 K/UL (ref 4.8–10.8)

## 2020-12-14 PROCEDURE — 99395 PREV VISIT EST AGE 18-39: CPT | Performed by: NURSE PRACTITIONER

## 2020-12-14 PROCEDURE — 71046 X-RAY EXAM CHEST 2 VIEWS: CPT

## 2020-12-14 RX ORDER — LAMOTRIGINE 100 MG/1
200 TABLET ORAL 2 TIMES DAILY
Qty: 60 TABLET | Refills: 5 | Status: SHIPPED | OUTPATIENT
Start: 2020-12-14 | End: 2021-07-08 | Stop reason: SDUPTHER

## 2020-12-14 RX ORDER — HALOPERIDOL 2 MG/1
2 TABLET ORAL 2 TIMES DAILY
Qty: 60 TABLET | Refills: 5 | Status: SHIPPED | OUTPATIENT
Start: 2020-12-14 | End: 2021-01-12 | Stop reason: SDUPTHER

## 2020-12-14 RX ORDER — QUETIAPINE FUMARATE 300 MG/1
300 TABLET, FILM COATED ORAL NIGHTLY
Qty: 30 TABLET | Refills: 5 | Status: SHIPPED | OUTPATIENT
Start: 2020-12-14 | End: 2021-01-12 | Stop reason: SDUPTHER

## 2020-12-14 RX ORDER — FAMOTIDINE 40 MG/1
40 TABLET, FILM COATED ORAL 2 TIMES DAILY
Qty: 60 TABLET | Refills: 5 | Status: SHIPPED | OUTPATIENT
Start: 2020-12-14 | End: 2021-02-09

## 2020-12-14 RX ORDER — BENZTROPINE MESYLATE 1 MG/1
1 TABLET ORAL 2 TIMES DAILY
Qty: 60 TABLET | Refills: 3 | Status: SHIPPED | OUTPATIENT
Start: 2020-12-14 | End: 2021-01-12 | Stop reason: SDUPTHER

## 2020-12-14 ASSESSMENT — ENCOUNTER SYMPTOMS
TROUBLE SWALLOWING: 0
EYES NEGATIVE: 1

## 2020-12-14 NOTE — PATIENT INSTRUCTIONS

## 2020-12-14 NOTE — PROGRESS NOTES
[address]  [place], [address] [place] [address] Rd  Phone [phone]   Fax [phone]      OFFICE VISIT: 2020    Abelino Sender- : 1985      Reason For Visit:  Minda Dotson is a 28 y.o. Shahrzad Rings is here for Annual Exam         Health Maintenance     HPI    Patient is here for yearly check up and labwork    Eyes: up to date  Dentist:  Up to date  Skin:  denies  Labs:  fasting  PSA:   Nocturia:    Stream:   ED:    Colonoscopy:    Exercise: works labor  Diet and Nut:   regular  MVI:    Supplements:    Asa:   Depression:  denies  Body Image: denies  Fall:  denies  EOL:  At times rare perhaps once a year  Tobacco: vape    Substance: at times marijuana  Immunization:  denies  Sleep:controlled on regimen  Cognition: stable    Health Maintenance: none              height is 5' 8\" (1.727 m) and weight is 213 lb 4 oz (96.7 kg). His temporal temperature is 96.8 °F (36 °C). His blood pressure is 128/84 and his pulse is 84. His oxygen saturation is 98%. Body mass index is 32.42 kg/m².     Results for orders placed or performed in visit on 20   POCT Rapid Drug Screen   Result Value Ref Range    Amphetamine Screen, Urine neg     Barbiturate Screen, Urine neg     Benzodiazepine Screen, Urine neg     Buprenorphine Urine neg     Cocaine Metabolite Screen, Urine neg     Gabapentin Screen, Urine neg     MDMA, Urine neg     Methamphetamine, Urine neg     Methadone Screen, Urine      Opiate Scrn, Ur neg     Oxycodone Screen, Ur neg     PCP Screen, Urine      Propoxyphene Screen, Urine      THC Screen, Urine neg     Tricyclic Antidepressants, Urine         I have reviewed the following with the Mr. Benjamin Meckel   Lab Review   Office Visit on 2020   Component Date Value    Amphetamine Screen, Urine 2020 neg     Barbiturate Screen, Urine 2020 neg     Benzodiazepine Screen, U* 2020 neg     Buprenorphine Urine 2020 neg     Cocaine Metabolite Scree* 2020 neg  Gabapentin Screen, Urine 06/29/2020 neg     MDMA, Urine 06/29/2020 neg     Methamphetamine, Urine 06/29/2020 neg     Opiate Scrn, Ur 06/29/2020 neg     Oxycodone Screen, Ur 06/29/2020 neg     THC Screen, Urine 06/29/2020 neg      Copies of these are in the chart. Prior to Visit Medications    Medication Sig Taking? Authorizing Provider   haloperidol (HALDOL) 2 MG tablet Take 1 tablet by mouth 2 times daily Yes Nj Toney APRN   benztropine (COGENTIN) 1 MG tablet Take 1 tablet by mouth 2 times daily Yes Murmariah Forrestgle, APRN   QUEtiapine (SEROQUEL) 300 MG tablet Take 1 tablet by mouth nightly Yes Nj Vasqueze APRN   lamoTRIgine (LAMICTAL) 100 MG tablet Take 2 tablets by mouth 2 times daily Yes Nj Forrestgle, APRN   famotidine (PEPCID) 40 MG tablet Take 1 tablet by mouth 2 times daily Yes Nj Vasqueze APRN   clonazePAM (KLONOPIN) 1 MG tablet Take 1 tablet by mouth 2 times daily as needed for Anxiety for up to 30 days. Yes Nj Vasqueze, APRN   ibuprofen (ADVIL;MOTRIN) 800 MG tablet Take 1 tablet by mouth 3 times daily (with meals) Yes Nj Vasqueze, APRN   sildenafil (VIAGRA) 100 MG tablet Take 1 tablet by mouth daily as needed for Erectile Dysfunction  Nj Toney APRN       Allergies: Tetracyclines & related    Past Medical History:   Diagnosis Date    Depression        No past surgical history on file. Social History     Tobacco Use    Smoking status: Former Smoker    Smokeless tobacco: Never Used   Substance Use Topics    Alcohol use: Not Currently       Review of Systems   Constitutional: Negative for activity change, appetite change, fatigue and fever. HENT: Negative for congestion, postnasal drip and trouble swallowing. Eyes: Negative.     Neurological:        Shaking improved on medication Psychiatric/Behavioral: Negative for agitation (stable), decreased concentration, self-injury, sleep disturbance (improved on seroquel nightly) and suicidal ideas. The patient is not nervous/anxious. Paranoid and antisocial behavior improved           Physical Exam  Vitals signs reviewed. Constitutional:       General: He is not in acute distress. Appearance: Normal appearance. He is not ill-appearing. HENT:      Head: Normocephalic. Right Ear: Tympanic membrane normal.      Left Ear: Tympanic membrane normal.   Cardiovascular:      Rate and Rhythm: Normal rate and regular rhythm. Heart sounds: No murmur. Pulmonary:      Effort: Pulmonary effort is normal.      Breath sounds: Normal breath sounds. Musculoskeletal:         General: No tenderness. Skin:     Findings: No rash. Neurological:      Mental Status: He is alert and oriented to person, place, and time. Psychiatric:         Mood and Affect: Mood normal.         Behavior: Behavior normal.      Comments: Improving at present           ASSESSMENT/ PLAN    1. Well adult exam  Will do fasting  Will monitor  - Comprehensive Metabolic Panel; Future  - CBC Auto Differential; Future  - Lipid Panel; Future  - TSH without Reflex; Future  - T4, Free; Future  - Microalbumin / Creatinine Urine Ratio; Future  - Hemoglobin A1C; Future  - Vitamin D 25 Hydroxy; Future    2. Persistent mood (affective) disorder, unspecified (HCC)  Cont present  Doing well  Will try and adjust as needed  - haloperidol (HALDOL) 2 MG tablet; Take 1 tablet by mouth 2 times daily  Dispense: 60 tablet; Refill: 5  - benztropine (COGENTIN) 1 MG tablet; Take 1 tablet by mouth 2 times daily  Dispense: 60 tablet; Refill: 3  - QUEtiapine (SEROQUEL) 300 MG tablet; Take 1 tablet by mouth nightly  Dispense: 30 tablet; Refill: 5  - lamoTRIgine (LAMICTAL) 100 MG tablet; Take 2 tablets by mouth 2 times daily  Dispense: 60 tablet; Refill: 5    3.  [facility] Cont doing well  - haloperidol (HALDOL) 2 MG tablet; Take 1 tablet by mouth 2 times daily  Dispense: 60 tablet; Refill: 5  - benztropine (COGENTIN) 1 MG tablet; Take 1 tablet by mouth 2 times daily  Dispense: 60 tablet; Refill: 3  - QUEtiapine (SEROQUEL) 300 MG tablet; Take 1 tablet by mouth nightly  Dispense: 30 tablet; Refill: 5  - lamoTRIgine (LAMICTAL) 100 MG tablet; Take 2 tablets by mouth 2 times daily  Dispense: 60 tablet; Refill: 5    4. Extrapyramidal movement disorder  stable  - benztropine (COGENTIN) 1 MG tablet; Take 1 tablet by mouth 2 times daily  Dispense: 60 tablet; Refill: 3    5. Gastroesophageal reflux disease without esophagitis  stable  - famotidine (PEPCID) 40 MG tablet; Take 1 tablet by mouth 2 times daily  Dispense: 60 tablet; Refill: 5    6. Smoking  Will try to quit   Not doing well  counseled for 5 minutes  - XR CHEST STANDARD (2 VW); Future      Orders Placed This Encounter   Procedures    XR CHEST STANDARD (2 VW)    Comprehensive Metabolic Panel    CBC Auto Differential    Lipid Panel    TSH without Reflex    T4, Free    Microalbumin / Creatinine Urine Ratio    Hemoglobin A1C    Vitamin D 25 Hydroxy        No follow-ups on file. Patient Instructions     Patient Education        Well Visit, Ages 25 to 48: Care Instructions  Your Care Instructions     Physical exams can help you stay healthy. Your doctor has checked your overall health and may have suggested ways to take good care of yourself. He or she also may have recommended tests. At home, you can help prevent illness with healthy eating, regular exercise, and other steps. Follow-up care is a key part of your treatment and safety. Be sure to make and go to all appointments, and call your doctor if you are having problems. It's also a good idea to know your test results and keep a list of the medicines you take. How can you care for yourself at home? · Reach and stay at a healthy weight. This will lower your risk for many problems, such as obesity, diabetes, heart disease, and high blood pressure. · Get at least 30 minutes of physical activity on most days of the week. Walking is a good choice. You also may want to do other activities, such as running, swimming, cycling, or playing tennis or team sports. Discuss any changes in your exercise program with your doctor. · Do not smoke or allow others to smoke around you. If you need help quitting, talk to your doctor about stop-smoking programs and medicines. These can increase your chances of quitting for good. · Talk to your doctor about whether you have any risk factors for sexually transmitted infections (STIs). Having one sex partner (who does not have STIs and does not have sex with anyone else) is a good way to avoid these infections. · Use birth control if you do not want to have children at this time. Talk with your doctor about the choices available and what might be best for you. · Protect your skin from too much sun. When you're outdoors from 10 a.m. to 4 p.m., stay in the shade or cover up with clothing and a hat with a wide brim. Wear sunglasses that block UV rays. Even when it's cloudy, put broad-spectrum sunscreen (SPF 30 or higher) on any exposed skin. · See a dentist one or two times a year for checkups and to have your teeth cleaned. · Wear a seat belt in the car. Follow your doctor's advice about when to have certain tests. These tests can spot problems early. For everyone  · Cholesterol. Have the fat (cholesterol) in your blood tested after age 21. Your doctor will tell you how often to have this done based on your age, family history, or other things that can increase your risk for heart disease. · Blood pressure. Have your blood pressure checked during a routine doctor visit. Your doctor will tell you how often to check your blood pressure based on your age, your blood pressure results, and other factors. · Vision. Talk with your doctor about how often to have a glaucoma test.  · Diabetes. Ask your doctor whether you should have tests for diabetes. · Colon cancer. Your risk for colorectal cancer gets higher as you get older. Some experts say that adults should start regular screening at age 48 and stop at age 76. Others say to start before age 48 or continue after age 76. Talk with your doctor about your risk and when to start and stop screening. For women  · Breast exam and mammogram. Talk to your doctor about when you should have a clinical breast exam and a mammogram. Medical experts differ on whether and how often women under 50 should have these tests. Your doctor can help you decide what is right for you. · Cervical cancer screening test and pelvic exam. Begin with a Pap test at age 24. The test often is part of a pelvic exam. Starting at age 27, you may choose to have a Pap test, an HPV test, or both tests at the same time (called co-testing). Talk with your doctor about how often to have testing. · Tests for sexually transmitted infections (STIs). Ask whether you should have tests for STIs. You may be at risk if you have sex with more than one person, especially if your partners do not wear condoms. For men  · Tests for sexually transmitted infections (STIs). Ask whether you should have tests for STIs. You may be at risk if you have sex with more than one person, especially if you do not wear a condom. · Testicular cancer exam. Ask your doctor whether you should check your testicles regularly.

## 2020-12-14 NOTE — TELEPHONE ENCOUNTER
----- Message from RADHA Hampton sent at 12/14/2020 12:49 PM CST -----  CBC stable no anemia or concerns

## 2020-12-14 NOTE — TELEPHONE ENCOUNTER
\"the person you are calling has calling restrictions. \"     RADHA Sultana  [facility] [place] [facility]             Xray negative great news

## 2020-12-15 ENCOUNTER — TELEPHONE (OUTPATIENT)
Dept: PRIMARY CARE CLINIC | Age: 35
End: 2020-12-15

## 2020-12-15 NOTE — TELEPHONE ENCOUNTER
----- Message from RADHA Pacheco sent at 12/14/2020  1:39 PM CST -----   we need to consider lipitor or another cholesterol medication if family history of heart attacks and strokes  Cmp electrolytes liver and kidneys wnl  Your vitamin d level is low  Suggest start vitamin d 79468 units 1 po weekly for 8 weeks #4 1 refill  Then recheck level    Thyroid wnl

## 2021-01-12 ENCOUNTER — OFFICE VISIT (OUTPATIENT)
Dept: PRIMARY CARE CLINIC | Age: 36
End: 2021-01-12
Payer: MEDICAID

## 2021-01-12 VITALS
HEART RATE: 75 BPM | WEIGHT: 210 LBS | OXYGEN SATURATION: 97 % | DIASTOLIC BLOOD PRESSURE: 84 MMHG | TEMPERATURE: 96.3 F | SYSTOLIC BLOOD PRESSURE: 122 MMHG | BODY MASS INDEX: 31.93 KG/M2

## 2021-01-12 DIAGNOSIS — F22 PARANOID BEHAVIOR (HCC): ICD-10-CM

## 2021-01-12 DIAGNOSIS — G25.9 EXTRAPYRAMIDAL MOVEMENT DISORDER: ICD-10-CM

## 2021-01-12 DIAGNOSIS — F34.9 PERSISTENT MOOD (AFFECTIVE) DISORDER, UNSPECIFIED (HCC): Primary | ICD-10-CM

## 2021-01-12 DIAGNOSIS — F98.4 ATYPICAL STEREOTYPED MOVEMENT DISORDER: ICD-10-CM

## 2021-01-12 DIAGNOSIS — F31.9 BIPOLAR 1 DISORDER (HCC): ICD-10-CM

## 2021-01-12 DIAGNOSIS — Z79.899 MEDICATION MANAGEMENT: ICD-10-CM

## 2021-01-12 DIAGNOSIS — K21.9 GASTROESOPHAGEAL REFLUX DISEASE WITHOUT ESOPHAGITIS: ICD-10-CM

## 2021-01-12 PROCEDURE — 99214 OFFICE O/P EST MOD 30 MIN: CPT | Performed by: NURSE PRACTITIONER

## 2021-01-12 PROCEDURE — 80305 DRUG TEST PRSMV DIR OPT OBS: CPT | Performed by: NURSE PRACTITIONER

## 2021-01-12 RX ORDER — BENZTROPINE MESYLATE 1 MG/1
1 TABLET ORAL 2 TIMES DAILY
Qty: 60 TABLET | Refills: 3 | Status: SHIPPED | OUTPATIENT
Start: 2021-01-12 | End: 2021-02-09 | Stop reason: SDUPTHER

## 2021-01-12 RX ORDER — HALOPERIDOL 2 MG/1
2 TABLET ORAL 2 TIMES DAILY
Qty: 60 TABLET | Refills: 5 | Status: SHIPPED | OUTPATIENT
Start: 2021-01-12 | End: 2021-05-07

## 2021-01-12 RX ORDER — PANTOPRAZOLE SODIUM 40 MG/1
40 TABLET, DELAYED RELEASE ORAL
Qty: 30 TABLET | Refills: 5 | Status: SHIPPED | OUTPATIENT
Start: 2021-01-12 | End: 2021-05-07 | Stop reason: SDUPTHER

## 2021-01-12 RX ORDER — QUETIAPINE FUMARATE 300 MG/1
300 TABLET, FILM COATED ORAL NIGHTLY
Qty: 30 TABLET | Refills: 5 | Status: SHIPPED | OUTPATIENT
Start: 2021-01-12 | End: 2021-10-13 | Stop reason: SDUPTHER

## 2021-01-12 RX ORDER — CLONAZEPAM 1 MG/1
1 TABLET ORAL 2 TIMES DAILY PRN
Qty: 60 TABLET | Refills: 0 | Status: SHIPPED | OUTPATIENT
Start: 2021-01-12 | End: 2021-02-09 | Stop reason: SDUPTHER

## 2021-01-12 ASSESSMENT — ENCOUNTER SYMPTOMS
EYES NEGATIVE: 1
TROUBLE SWALLOWING: 0
NAUSEA: 1

## 2021-01-12 NOTE — PATIENT INSTRUCTIONS
Patient Education        Bipolar Disorder: Care Instructions  Your Care Instructions     Bipolar disorder is an illness that causes extreme mood changes, from times of very high energy (manic episodes) to times of depression. But many people with bipolar disorder show only the symptoms of depression. These moods may cause problems with your work, school, family life, friendships, and how well you function. This disease is also called manic-depression. There is no cure for bipolar disorder, but it can be helped with medicines. Counseling may also help. It is important to take your medicines exactly as prescribed, even when you feel well. You may need lifelong treatment. Follow-up care is a key part of your treatment and safety. Be sure to make and go to all appointments, and call your doctor if you are having problems. It's also a good idea to know your test results and keep a list of the medicines you take. How can you care for yourself at home? · Be safe with medicines. Take your medicines exactly as prescribed. Do not stop or change a medicine without talking to your doctor first. Abena Diop and your doctor may need to try different combinations of medicines to find what works for you. · Take your medicines on schedule to keep your moods even. When you feel good, you may think that you do not need your medicines. But it is important to keep taking them. · Go to your counseling sessions. Call and talk with your counselor if you can't go to a session or if you don't think the sessions are helping. Do not just stop going. · Get at least 30 minutes of activity on most days of the week. Walking is a good choice. You also may want to do other things, such as running, swimming, or cycling. · Get enough sleep. Keep your room dark and quiet. Try to go to bed at the same time every night. · Eat a healthy diet. This includes whole grains, dairy, fruits, vegetables, and protein. Eat foods from each of these groups. · Try to lower your stress. Manage your time, build a strong support system, and lead a healthy lifestyle. To lower your stress, try physical activity, slow deep breathing, or getting a massage. · Do not use alcohol, marijuana, or illegal drugs. · Learn the early signs of your mood changes. You can then take steps to help yourself feel better. · Ask for help from friends and family when you need it. You may need help with daily chores when you are depressed. When you are manic, you may need support to control your high energy levels. What should you do if someone in your family has bipolar disorder? · Learn about the disease and signs it's getting worse. · Remind your family member you love them. · Make a plan with all family members about how to take care of your loved one when symptoms are bad. · Remind yourself it will take time for changes to occur. · Try not to blame yourself for the disease. · Know your legal rights and the legal rights of your family member. Support groups or counselors can help with this information. · Take care of yourself. Keep up with your interests, such as career, hobbies, and friends. Use exercise, positive self-talk, deep breathing, and other relaxing exercises to help lower your stress. · Give yourself time to grieve. You may need to deal with emotions such as anger, fear, and frustration. · If you are having a hard time with your feelings or with your relationship with your family member, talk with a counselor. When should you call for help? Call 911 anytime you think you may need emergency care. For example, call if:    · You feel like hurting yourself or someone else.     · Someone who has bipolar disorder displays dangerous behavior, and you think the person might hurt himself or herself or someone else. Call your doctor now or seek immediate medical care if:    · You hear voices.   · Someone you know has bipolar disorder and talks about suicide. Keep the numbers for these national suicide hotlines: 7-893-742-TALK (3-640.934.8184) and 5-692-CGFGYHL (8-535.732.1112). If a suicide threat seems real, with a specific plan and a way to carry it out, stay with the person, or ask someone you trust to stay with the person, until you can get help.     · Someone you know has bipolar disorder and:  ? Starts to give away possessions. ? Is using illegal drugs or drinking alcohol heavily. ? Talks or writes about death, including writing suicide notes or talking about guns, knives, or pills. ? Talks or writes about hurting someone else. ? Starts to spend a lot of time alone. ? Acts very aggressively or suddenly appears calm. ? Talks about beliefs that are not based in reality (delusions). Watch closely for changes in your health, and be sure to contact your doctor if:    · You cannot go to your counseling sessions. Where can you learn more? Go to https://[employer] [email or web address]. org and sign in to your [employer] account. Enter K052 in the Victrio box to learn more about \"Bipolar Disorder: Care Instructions. \"     If you do not have an account, please click on the \"Sign Up Now\" link. Current as of: January 31, 2020               Content Version: 12.6  © 6170-0682 [employer]. Care instructions adapted under license by [facility] ([facility]). If you have questions about a medical condition or this instruction, always ask your healthcare professional. [place] [address] any warranty or liability for your use of this information.          Patient Education        Gastroesophageal Reflux Disease (GERD): Care Instructions  Overview Gastroesophageal reflux disease (GERD) is the backward flow of stomach acid into the esophagus. The esophagus is the tube that leads from your throat to your stomach. A one-way valve prevents the stomach acid from backing up into this tube. But when you have GERD, this valve does not close tightly enough. This can also cause pain and swelling in your esophagus. (This is called esophagitis.)  If you have mild GERD symptoms including heartburn, you may be able to control the problem with antacids or over-the-counter medicine. You can also make lifestyle changes to help reduce your symptoms. These include changing your diet and eating habits, such as not eating late at night and losing weight. Follow-up care is a key part of your treatment and safety. Be sure to make and go to all appointments, and call your doctor if you are having problems. It's also a good idea to know your test results and keep a list of the medicines you take. How can you care for yourself at home? · Take your medicines exactly as prescribed. Call your doctor if you think you are having a problem with your medicine. · Your doctor may recommend over-the-counter medicine. For mild or occasional indigestion, antacids, such as Tums, Gaviscon, Mylanta, or Maalox, may help. Your doctor also may recommend over-the-counter acid reducers, such as famotidine (Pepcid AC), cimetidine (Tagamet HB), or omeprazole (Prilosec). Read and follow all instructions on the label. If you use these medicines often, talk with your doctor. · Change your eating habits. ? It's best to eat several small meals instead of two or three large meals. ? After you eat, wait 2 to 3 hours before you lie down. ? Chocolate, mint, and alcohol can make GERD worse. ? Spicy foods, foods that have a lot of acid (like tomatoes and oranges), and coffee can make GERD symptoms worse in some people. If your symptoms are worse after you eat a certain food, you may want to stop eating that food to see if your symptoms get better. · Do not smoke or chew tobacco. Smoking can make GERD worse. If you need help quitting, talk to your doctor about stop-smoking programs and medicines. These can increase your chances of quitting for good. · If you have GERD symptoms at night, raise the head of your bed 6 to 8 inches by putting the frame on blocks or placing a foam wedge under the head of your mattress. (Adding extra pillows does not work.)  · Do not wear tight clothing around your middle. · Lose weight if you need to. Losing just 5 to 10 pounds can help. When should you call for help? Call your doctor now or seek immediate medical care if:    · You have new or different belly pain.     · Your stools are black and tarlike or have streaks of blood. Watch closely for changes in your health, and be sure to contact your doctor if:    · Your symptoms have not improved after 2 days.     · Food seems to catch in your throat or chest.   Where can you learn more? Go to https://[employer]. org and sign in to your [employer] account. Enter J242 in the [employer]. box to learn more about \"Gastroesophageal Reflux Disease (GERD): Care Instructions. \"     If you do not have an account, please click on the \"Sign Up Now\" link. Current as of: April 15, 2020               Content Version: 12.6  © 7706-9550 [email or web address]. Care instructions adapted under license by [facility] ([facility]). If you have questions about a medical condition or this instruction, always ask your healthcare professional. [place] [address] any warranty or liability for your use of this information.

## 2021-01-12 NOTE — PROGRESS NOTES
5. Paranoid behavior ([address]  -     clonazePAM (KLONOPIN) 1 MG tablet; Take 1 tablet by mouth 2 times daily as needed for Anxiety for up to 30 days. , Disp-60 tablet, R-0Normal  -     Lubna Coronado MD, Gastroenterology, [place]  6. Atypical stereotyped movement disorder  -     clonazePAM (KLONOPIN) 1 MG tablet; Take 1 tablet by mouth 2 times daily as needed for Anxiety for up to 30 days. , Disp-60 tablet, R-0Normal  7. Extrapyramidal movement disorder  Improving    -     benztropine (COGENTIN) 1 MG tablet; Take 1 tablet by mouth 2 times daily, Disp-60 tablet, R-3Normal      Return in about 4 weeks (around 2/9/2021) for gerd and mood follow up. SUBJECTIVE/OBJECTIVE:  Patient is here for 1month follow up    Reports didn't sleep last night  Got up 3 am as he forgot his medication  But was to late to take it  He was just to tired last night and then once realized to late    Has been working hard at [employer]    He been taking haldol mood control  But shaking as side effect  And was started on cogentin with relief of shaking  But still has some      Reports moods are still paranoid  Has a roommate  And now is worried as makes him paranoid  He still bothered by it \"long story\"       Anxiety  He has been taking klonipin twice a day  It is helpful to work and function  But he has to have to to function at present  12/14 *60  He still has the same job this month  [employer] working well      GERD   Has on  Zantac twice a day  Has cut back on foods that bothers him  But still heart burn   Would like to add to regimen    TYLER was reviewed today per office protocol. Report shows No discrepancies. Fill pattern is consistent from single provider(s) at single pharmacy(s). Controlled Substance Monitoring:    Acute and Chronic Pain Monitoring:   RX Monitoring 1/12/2021   Attestation The Prescription Monitoring Report for this patient was reviewed today. Periodic Controlled Substance Monitoring Possible medication side effects, risk of tolerance/dependence & alternative treatments discussed. ;No signs of potential drug abuse or diversion identified.;Obtaining appropriate analgesic effect of treatment. ;Random urine drug screen sent today. Chronic Pain > 50 MEDD Considered consultation with a specialist.             Review of Systems   Constitutional: Negative for activity change, appetite change, fatigue and fever. HENT: Negative for congestion, postnasal drip and trouble swallowing. Eyes: Negative. Gastrointestinal: Positive for nausea (with gerd). Neurological:        Shaking improved on medication   Psychiatric/Behavioral: Negative for agitation (stable), decreased concentration, self-injury, sleep disturbance (improved on seroquel nightly) and suicidal ideas. The patient is not nervous/anxious. Paranoid and antisocial behavior improved         Physical Exam  Vitals signs reviewed. Constitutional:       General: He is not in acute distress. Appearance: Normal appearance. He is not ill-appearing. HENT:      Head: Normocephalic. Right Ear: Tympanic membrane normal.      Left Ear: Tympanic membrane normal.   Cardiovascular:      Rate and Rhythm: Normal rate and regular rhythm. Heart sounds: No murmur. Pulmonary:      Effort: Pulmonary effort is normal.      Breath sounds: Normal breath sounds. Musculoskeletal:         General: No tenderness. Skin:     Findings: No rash. Neurological:      Mental Status: He is alert and oriented to person, place, and time. Psychiatric:         Mood and Affect: Mood normal.         Behavior: Behavior normal.      Comments: Improving at present                   An electronic signature was used to authenticate this note.     --RADHA Rai

## 2021-02-09 ENCOUNTER — OFFICE VISIT (OUTPATIENT)
Dept: PRIMARY CARE CLINIC | Age: 36
End: 2021-02-09
Payer: MEDICAID

## 2021-02-09 VITALS
TEMPERATURE: 98.6 F | WEIGHT: 210.75 LBS | HEIGHT: 68 IN | BODY MASS INDEX: 31.94 KG/M2 | DIASTOLIC BLOOD PRESSURE: 80 MMHG | HEART RATE: 74 BPM | SYSTOLIC BLOOD PRESSURE: 136 MMHG | OXYGEN SATURATION: 96 %

## 2021-02-09 DIAGNOSIS — G25.9 EXTRAPYRAMIDAL MOVEMENT DISORDER: ICD-10-CM

## 2021-02-09 DIAGNOSIS — F22 PARANOID BEHAVIOR (HCC): ICD-10-CM

## 2021-02-09 DIAGNOSIS — F34.9 PERSISTENT MOOD (AFFECTIVE) DISORDER, UNSPECIFIED (HCC): ICD-10-CM

## 2021-02-09 DIAGNOSIS — F31.9 BIPOLAR 1 DISORDER (HCC): ICD-10-CM

## 2021-02-09 DIAGNOSIS — F98.4 ATYPICAL STEREOTYPED MOVEMENT DISORDER: ICD-10-CM

## 2021-02-09 PROCEDURE — 99213 OFFICE O/P EST LOW 20 MIN: CPT | Performed by: NURSE PRACTITIONER

## 2021-02-09 RX ORDER — CLONAZEPAM 1 MG/1
1 TABLET ORAL 2 TIMES DAILY PRN
Qty: 60 TABLET | Refills: 0 | Status: SHIPPED | OUTPATIENT
Start: 2021-02-09 | End: 2021-02-09 | Stop reason: SDUPTHER

## 2021-02-09 RX ORDER — BENZTROPINE MESYLATE 1 MG/1
1 TABLET ORAL 2 TIMES DAILY
Qty: 60 TABLET | Refills: 3 | Status: SHIPPED | OUTPATIENT
Start: 2021-02-09 | End: 2021-05-07

## 2021-02-09 RX ORDER — CLONAZEPAM 1 MG/1
1 TABLET ORAL 2 TIMES DAILY PRN
Qty: 60 TABLET | Refills: 0 | Status: SHIPPED | OUTPATIENT
Start: 2021-02-09 | End: 2021-05-07

## 2021-02-09 ASSESSMENT — ENCOUNTER SYMPTOMS
TROUBLE SWALLOWING: 0
NAUSEA: 0
EYES NEGATIVE: 1

## 2021-02-09 NOTE — PATIENT INSTRUCTIONS
Patient Education        Bipolar Disorder: Care Instructions  Your Care Instructions     Bipolar disorder is an illness that causes extreme mood changes, from times of very high energy (manic episodes) to times of depression. But many people with bipolar disorder show only the symptoms of depression. These moods may cause problems with your work, school, family life, friendships, and how well you function. This disease is also called manic-depression. There is no cure for bipolar disorder, but it can be helped with medicines. Counseling may also help. It is important to take your medicines exactly as prescribed, even when you feel well. You may need lifelong treatment. Follow-up care is a key part of your treatment and safety. Be sure to make and go to all appointments, and call your doctor if you are having problems. It's also a good idea to know your test results and keep a list of the medicines you take. How can you care for yourself at home? · Be safe with medicines. Take your medicines exactly as prescribed. Do not stop or change a medicine without talking to your doctor first. Abena Diop and your doctor may need to try different combinations of medicines to find what works for you. · Take your medicines on schedule to keep your moods even. When you feel good, you may think that you do not need your medicines. But it is important to keep taking them. · Go to your counseling sessions. Call and talk with your counselor if you can't go to a session or if you don't think the sessions are helping. Do not just stop going. · Get at least 30 minutes of activity on most days of the week. Walking is a good choice. You also may want to do other things, such as running, swimming, or cycling. · Get enough sleep. Keep your room dark and quiet. Try to go to bed at the same time every night. · Eat a healthy diet. This includes whole grains, dairy, fruits, vegetables, and protein. Eat foods from each of these groups. · Try to lower your stress. Manage your time, build a strong support system, and lead a healthy lifestyle. To lower your stress, try physical activity, slow deep breathing, or getting a massage. · Do not use alcohol, marijuana, or illegal drugs. · Learn the early signs of your mood changes. You can then take steps to help yourself feel better. · Ask for help from friends and family when you need it. You may need help with daily chores when you are depressed. When you are manic, you may need support to control your high energy levels. What should you do if someone in your family has bipolar disorder? · Learn about the disease and signs it's getting worse. · Remind your family member you love them. · Make a plan with all family members about how to take care of your loved one when symptoms are bad. · Remind yourself it will take time for changes to occur. · Try not to blame yourself for the disease. · Know your legal rights and the legal rights of your family member. Support groups or counselors can help with this information. · Take care of yourself. Keep up with your interests, such as career, hobbies, and friends. Use exercise, positive self-talk, deep breathing, and other relaxing exercises to help lower your stress. · Give yourself time to grieve. You may need to deal with emotions such as anger, fear, and frustration. · If you are having a hard time with your feelings or with your relationship with your family member, talk with a counselor. When should you call for help? Call 911 anytime you think you may need emergency care. For example, call if:    · You feel like hurting yourself or someone else.     · Someone who has bipolar disorder displays dangerous behavior, and you think the person might hurt himself or herself or someone else. Call your doctor now or seek immediate medical care if:    · You hear voices.   · Someone you know has bipolar disorder and talks about suicide. Keep the numbers for these national suicide hotlines: 1-511-403-TALK (5-393.530.7428) and 9-733-XMOLRQP (6-558.685.9669). If a suicide threat seems real, with a specific plan and a way to carry it out, stay with the person, or ask someone you trust to stay with the person, until you can get help.     · Someone you know has bipolar disorder and:  ? Starts to give away possessions. ? Is using illegal drugs or drinking alcohol heavily. ? Talks or writes about death, including writing suicide notes or talking about guns, knives, or pills. ? Talks or writes about hurting someone else. ? Starts to spend a lot of time alone. ? Acts very aggressively or suddenly appears calm. ? Talks about beliefs that are not based in reality (delusions). Watch closely for changes in your health, and be sure to contact your doctor if:    · You cannot go to your counseling sessions. Where can you learn more? Go to [email or web address]://IN[employer]U Managed Hostingpepicevlyeb.[email or web address]. org and sign in to your ChemoCentryx account. Enter K052 in the [employer] box to learn more about \"Bipolar Disorder: Care Instructions. \"     If you do not have an account, please click on the \"Sign Up Now\" link. Current as of: January 31, 2020               Content Version: 12.6  © 7661-9376 [employer], Incorporated. Care instructions adapted under license by [facility] ([facility]). If you have questions about a medical condition or this instruction, always ask your healthcare professional. [address] any warranty or liability for your use of this information.

## 2021-02-09 NOTE — PROGRESS NOTES
Ankita Arango (:  1985) is a 28 y.o. male,Established patient, here for evaluation of the following chief complaint(s):  1 Month Follow-Up (anxiety and moods )      ASSESSMENT/PLAN:  1. Bipolar 1 disorder ([facility]  Cont present at goal  Has a job since august goal for the patient  [employer] well  -     clonazePAM (KLONOPIN) 1 MG tablet; Take 1 tablet by mouth 2 times daily as needed for Anxiety for up to 30 days. , Disp-60 tablet, R-0Normal  -     benztropine (COGENTIN) 1 MG tablet; Take 1 tablet by mouth 2 times daily, Disp-60 tablet, R-3Normal  2. Persistent mood (affective) disorder, unspecified (HCC)  Cont reigmen  Make sure using cogentin for the shaking from the haldol  -     clonazePAM (KLONOPIN) 1 MG tablet; Take 1 tablet by mouth 2 times daily as needed for Anxiety for up to 30 days. , Disp-60 tablet, R-0Normal  -     benztropine (COGENTIN) 1 MG tablet; Take 1 tablet by mouth 2 times daily, Disp-60 tablet, R-3Normal  3. Paranoid behavior ([facility]  -     clonazePAM (KLONOPIN) 1 MG tablet; Take 1 tablet by mouth 2 times daily as needed for Anxiety for up to 30 days. , Disp-60 tablet, R-0Normal  4. Atypical stereotyped movement disorder  Stable    -     clonazePAM (KLONOPIN) 1 MG tablet; Take 1 tablet by mouth 2 times daily as needed for Anxiety for up to 30 days. , Disp-60 tablet, R-0Normal  5. Extrapyramidal movement disorder  Improved    -     benztropine (COGENTIN) 1 MG tablet; Take 1 tablet by mouth 2 times daily, Disp-60 tablet, R-3Normal      No follow-ups on file.     SUBJECTIVE/OBJECTIVE:  Patient is here for 1month follow up    Has been working hard at [employer]  Has been since august and doing well at present  And kept a job since     He been taking haldol mood control  But shaking as side effect  And was started on cogentin with relief of shaking  But still has some      Reports moods are still paranoid  Has a roommate  And now is worried as makes him paranoid  He still bothered by it

## 2021-03-08 ENCOUNTER — OFFICE VISIT (OUTPATIENT)
Dept: PRIMARY CARE CLINIC | Age: 36
End: 2021-03-08
Payer: MEDICAID

## 2021-03-08 VITALS
BODY MASS INDEX: 32.35 KG/M2 | DIASTOLIC BLOOD PRESSURE: 84 MMHG | WEIGHT: 212.75 LBS | TEMPERATURE: 96.6 F | HEART RATE: 71 BPM | SYSTOLIC BLOOD PRESSURE: 128 MMHG

## 2021-03-08 DIAGNOSIS — F31.9 BIPOLAR 1 DISORDER (HCC): ICD-10-CM

## 2021-03-08 DIAGNOSIS — F34.9 PERSISTENT MOOD (AFFECTIVE) DISORDER, UNSPECIFIED (HCC): ICD-10-CM

## 2021-03-08 DIAGNOSIS — F22 PARANOID BEHAVIOR (HCC): ICD-10-CM

## 2021-03-08 DIAGNOSIS — F98.4 ATYPICAL STEREOTYPED MOVEMENT DISORDER: ICD-10-CM

## 2021-03-08 PROCEDURE — 99214 OFFICE O/P EST MOD 30 MIN: CPT | Performed by: NURSE PRACTITIONER

## 2021-03-08 RX ORDER — CLONAZEPAM 1 MG/1
1 TABLET ORAL 2 TIMES DAILY PRN
Qty: 60 TABLET | Refills: 1 | Status: SHIPPED | OUTPATIENT
Start: 2021-03-08 | End: 2021-05-27

## 2021-03-08 ASSESSMENT — ENCOUNTER SYMPTOMS
NAUSEA: 0
TROUBLE SWALLOWING: 0
EYES NEGATIVE: 1

## 2021-03-08 NOTE — PATIENT INSTRUCTIONS
Try to lower your stress. Manage your time, build a strong support system, and lead a healthy lifestyle. To lower your stress, try physical activity, slow deep breathing, or getting a massage. · Do not use alcohol, marijuana, or illegal drugs. · Learn the early signs of your mood changes. You can then take steps to help yourself feel better. · Ask for help from friends and family when you need it. You may need help with daily chores when you are depressed. When you are manic, you may need support to control your high energy levels. What should you do if someone in your family has bipolar disorder? · Learn about the disease and signs it's getting worse. · Remind your family member you love them. · Make a plan with all family members about how to take care of your loved one when symptoms are bad. · Remind yourself it will take time for changes to occur. · Try not to blame yourself for the disease. · Know your legal rights and the legal rights of your family member. Support groups or counselors can help with this information. · Take care of yourself. Keep up with your interests, such as career, hobbies, and friends. Use exercise, positive self-talk, deep breathing, and other relaxing exercises to help lower your stress. · Give yourself time to grieve. You may need to deal with emotions such as anger, fear, and frustration. · If you are having a hard time with your feelings or with your relationship with your family member, talk with a counselor. When should you call for help? Call 911 anytime you think you may need emergency care. For example, call if:    · You feel like hurting yourself or someone else.     · Someone who has bipolar disorder displays dangerous behavior, and you think the person might hurt himself or herself or someone else. Call your doctor now or seek immediate medical care if:    · You hear voices.     · Someone you know has bipolar disorder and talks about suicide.  Keep the numbers for these national suicide hotlines: 9-875-738-TALK (8-710.492.1627) and 4-137-NVVHPQO (2-335.764.4672). If a suicide threat seems real, with a specific plan and a way to carry it out, stay with the person, or ask someone you trust to stay with the person, until you can get help.     · Someone you know has bipolar disorder and:  ? Starts to give away possessions. ? Is using illegal drugs or drinking alcohol heavily. ? Talks or writes about death, including writing suicide notes or talking about guns, knives, or pills. ? Talks or writes about hurting someone else. ? Starts to spend a lot of time alone. ? Acts very aggressively or suddenly appears calm. ? Talks about beliefs that are not based in reality (delusions). Watch closely for changes in your health, and be sure to contact your doctor if:    · You cannot go to your counseling sessions. Where can you learn more? Go to https://[employer].Hot Mix [email or web address] org and sign in to your Clinkle account. Enter K052 in the [facility] box to learn more about \"Bipolar Disorder: Care Instructions. \"     If you do not have an account, please click on the \"Sign Up Now\" link. Current as of: January 31, 2020               Content Version: 12.6  © 1567-7403 [employer], Incorporated. Care instructions adapted under license by [facility] ([facility]). If you have questions about a medical condition or this instruction, always ask your healthcare professional. [place] [address] any warranty or liability for your use of this information. Patient Education        Learning About Mood Disorders  What are mood disorders? Mood disorders are medical problems that affect how you feel. They can impact your moods, thoughts, and actions. Mood disorders include:  · Depression. This causes you to feel sad or hopeless for much of the time. · Bipolar disorder.  This causes extreme mood changes from manic episodes of very high energy to extreme lows of depression. · Seasonal affective disorder (SAD). This is a type of depression that affects you during the same season each year. Most often people experience SAD during the fall and winter months when days are shorter and there is less light. What are the symptoms? Depression  You may:  · Feel sad or hopeless nearly every day. · Lose interest in or not get pleasure from most daily activities. You feel this way nearly every day. · Have low energy, changes in your appetite, or changes in how well you sleep. · Have trouble concentrating. · Think about death and suicide. Keep the numbers for these national suicide hotlines: 9-938-054-TALK (7-455.548.8958) and 9-721-NBRDXLR (8-529.869.4709). If you or someone you know talks about suicide or feeling hopeless, get help right away. Bipolar disorder  Symptoms depend on your mood swings. You may:  · Feel very happy, energetic, or on edge. · Feel like you need very little sleep. · Feel overly self-confident. · Do impulsive things, such as spending a lot of money. · Feel sad or hopeless. · Have racing thoughts or trouble thinking and making decisions. · Lose interest in things you have enjoyed in the past.  · Think about death and suicide. Keep the numbers for these national suicide hotlines: 5-910-673-TALK (9-978.930.3048) and 5-810-BOLKHSY (9-148.678.3143). If you or someone you know talks about suicide or feeling hopeless, get help right away. Seasonal affective disorder (SAD)  Symptoms come and go at about the same time each year. For most people with SAD, symptoms come during the winter when there is less daylight. You may:  · Feel sad, grumpy, morgan, or anxious. · Lose interest in your usual activities. · Eat more and crave carbohydrates, such as bread and pasta. · Gain weight. · Sleep more and feel drowsy during the daytime. How are mood disorders treated? Mood disorders can be treated with medicines or counseling, or a combination of both. Medicines for depression and SAD may include antidepressants. Medicines for bipolar disorder may include:  · Mood stabilizers. · Antipsychotics. · Benzodiazepines. Counseling may involve cognitive-behavioral therapy. It teaches you how to change the ways you think and behave. This can help you stop thinking bad thoughts about yourself and your life. Light therapy is the main treatment for SAD. This therapy uses a special kind of lamp. You let the lamp shine on you at certain times, usually in the morning. This may help your symptoms during the months when there is less sunlight. Healthy lifestyle  Healthy lifestyle changes may help you feel better. · Be active often. You might try walking or strength training. · Eat a healthy diet. Include fruits, vegetables, lean proteins, and whole grains in your diet each day. · Keep a regular sleep schedule. Try for 8 hours of sleep a night. · Find ways to manage stress, such as relaxation exercises. · Avoid alcohol and illegal drugs. Follow-up care is a key part of your treatment and safety. Be sure to make and go to all appointments, and call your doctor if you are having problems. It's also a good idea to know your test results and keep a list of the medicines you take. Where can you learn more? Go to https://Tiny[email or web address].[email or web address] org and sign in to your Tumbie account. Enter O280 in the P[employer]utria box to learn more about \"Learning About Mood Disorders. \"     If you do not have an account, please click on the \"Sign Up Now\" link. Current as of: January 31, 2020               Content Version: 12.6  © 3275-1958 [employer]Mind, Incorporated. Care instructions adapted under license by [facility] ([facility]). If you have questions about a medical condition or this instruction, always ask your healthcare professional. [place] [address] any warranty or liability for your use of this information.

## 2021-03-08 NOTE — PROGRESS NOTES
Johanna Pitts (:  1985) is a 28 y.o. male,Established patient, here for evaluation of the following chief complaint(s):  Follow-up (bipolar medications)      ASSESSMENT/PLAN:  1. Bipolar 1 disorder (HCC)  Cont present on haldol twice a day  With cogentin for shaking    -     clonazePAM (KLONOPIN) 1 MG tablet; Take 1 tablet by mouth 2 times daily as needed for Anxiety for up to 30 days. , Disp-60 tablet, R-1Normal  2. Persistent mood (affective) disorder, unspecified (HCC)  Stable at present    -     clonazePAM (KLONOPIN) 1 MG tablet; Take 1 tablet by mouth 2 times daily as needed for Anxiety for up to 30 days. , Disp-60 tablet, R-1Normal  3. Paranoid behavior ([address]  seroquel at bedtime  For atleast 6 hours at night  With lamictal 2 twice a day   -     clonazePAM (KLONOPIN) 1 MG tablet; Take 1 tablet by mouth 2 times daily as needed for Anxiety for up to 30 days. , Disp-60 tablet, R-1Normal  4. Atypical stereotyped movement disorder  On cogentin  Discussed    -     clonazePAM (KLONOPIN) 1 MG tablet; Take 1 tablet by mouth 2 times daily as needed for Anxiety for up to 30 days. , Disp-60 tablet, R-1Normal      Return in about 2 months (around 2021) for medication follow up moods .     SUBJECTIVE/OBJECTIVE:  Patient is here for 1month follow up     Has been working hard at [employer]  Has been since august and doing well at present  And kept a job since     He been taking haldol mood control  But shaking as side effect  And was started on cogentin with relief of shaking  But still has some      Reports moods are still paranoid  Has a roommate  And now is worried as makes him paranoid  He still bothered by it \"long story\"  He reports that the last month he has issues  His brother  the other day  He bottled it up   He lost his brother to car wreck         Anxiety  He has been taking klonipin twice a day  It is helpful to work and function  But he has to have to to function at present   *60 He still has the same job this month  [employer] working well        GERD   We changed to protonix   And doing well at present  [address] was reviewed today per office protocol. Report shows No discrepancies. Fill pattern is consistent from single provider(s) at single pharmacy(s). Controlled Substance Monitoring:    Acute and Chronic Pain Monitoring:   RX Monitoring 3/8/2021   Attestation The Prescription Monitoring Report for this patient was reviewed today. Periodic Controlled Substance Monitoring Possible medication side effects, risk of tolerance/dependence & alternative treatments discussed. ;No signs of potential drug abuse or diversion identified.;Obtaining appropriate analgesic effect of treatment. Chronic Pain > 50 MEDD -     Insomnia  Taking the Seroquel taking a whole at bedtime  Reports that is sleeping around 10 hours  Well rested    Review of Systems   Constitutional: Negative for activity change, appetite change, fatigue and fever. HENT: Negative for congestion, postnasal drip and trouble swallowing. Eyes: Negative. Gastrointestinal: Negative for nausea (with gerd). Neurological:        Shaking improved on medication   Psychiatric/Behavioral: Negative for agitation (stable), decreased concentration, self-injury, sleep disturbance (improved on seroquel nightly) and suicidal ideas. The patient is not nervous/anxious. Paranoid and antisocial behavior improved         Physical Exam  Vitals signs reviewed. Constitutional:       General: He is not in acute distress. Appearance: Normal appearance. He is not ill-appearing. HENT:      Head: Normocephalic. Right Ear: Tympanic membrane normal.      Left Ear: Tympanic membrane normal.   Cardiovascular:      Rate and Rhythm: Normal rate and regular rhythm. Heart sounds: No murmur. Pulmonary:      Effort: Pulmonary effort is normal.      Breath sounds: Normal breath sounds.    Musculoskeletal: General: No tenderness. Skin:     Findings: No rash. Neurological:      Mental Status: He is alert and oriented to person, place, and time. Psychiatric:         Mood and Affect: Mood normal.         Behavior: Behavior normal.      Comments: Improving at present                 An electronic signature was used to authenticate this note.     --Steve Cavazos, APRN

## 2021-04-08 DIAGNOSIS — M79.673 PAIN OF FOOT, UNSPECIFIED LATERALITY: ICD-10-CM

## 2021-04-08 RX ORDER — IBUPROFEN 800 MG/1
800 TABLET ORAL
Qty: 90 TABLET | Refills: 5 | Status: SHIPPED | OUTPATIENT
Start: 2021-04-08 | End: 2021-07-08

## 2021-04-08 NOTE — TELEPHONE ENCOUNTER
Received fax from pharmacy requesting refill on pts medication(s). Pt was last seen in office on 3/8/2021  and has a follow up scheduled for 5/7/2021. Will send request to  Clotilde Orozco  for authorization.      Requested Prescriptions     Pending Prescriptions Disp Refills    ibuprofen (ADVIL;MOTRIN) 800 MG tablet 90 tablet 5     Sig: Take 1 tablet by mouth 3 times daily (with meals)

## 2021-05-07 ENCOUNTER — OFFICE VISIT (OUTPATIENT)
Dept: PRIMARY CARE CLINIC | Age: 36
End: 2021-05-07
Payer: MEDICAID

## 2021-05-07 VITALS
HEART RATE: 85 BPM | OXYGEN SATURATION: 95 % | BODY MASS INDEX: 29.84 KG/M2 | DIASTOLIC BLOOD PRESSURE: 88 MMHG | TEMPERATURE: 96.9 F | WEIGHT: 196.25 LBS | SYSTOLIC BLOOD PRESSURE: 124 MMHG

## 2021-05-07 DIAGNOSIS — F22 PARANOID BEHAVIOR (HCC): ICD-10-CM

## 2021-05-07 DIAGNOSIS — F31.9 BIPOLAR 1 DISORDER (HCC): ICD-10-CM

## 2021-05-07 DIAGNOSIS — K21.9 GASTROESOPHAGEAL REFLUX DISEASE WITHOUT ESOPHAGITIS: ICD-10-CM

## 2021-05-07 DIAGNOSIS — F34.9 PERSISTENT MOOD (AFFECTIVE) DISORDER, UNSPECIFIED (HCC): ICD-10-CM

## 2021-05-07 DIAGNOSIS — K58.0 IRRITABLE BOWEL SYNDROME WITH DIARRHEA: Primary | ICD-10-CM

## 2021-05-07 DIAGNOSIS — F98.4 ATYPICAL STEREOTYPED MOVEMENT DISORDER: ICD-10-CM

## 2021-05-07 PROCEDURE — 99214 OFFICE O/P EST MOD 30 MIN: CPT | Performed by: NURSE PRACTITIONER

## 2021-05-07 RX ORDER — ONDANSETRON 4 MG/1
4 TABLET, ORALLY DISINTEGRATING ORAL 3 TIMES DAILY PRN
Qty: 21 TABLET | Refills: 0 | Status: SHIPPED | OUTPATIENT
Start: 2021-05-07 | End: 2021-07-08

## 2021-05-07 RX ORDER — CIMETIDINE 400 MG/1
400 TABLET, FILM COATED ORAL 2 TIMES DAILY
Qty: 60 TABLET | Refills: 5 | Status: SHIPPED | OUTPATIENT
Start: 2021-05-07 | End: 2021-05-27

## 2021-05-07 RX ORDER — PANTOPRAZOLE SODIUM 40 MG/1
40 TABLET, DELAYED RELEASE ORAL
Qty: 30 TABLET | Refills: 5 | Status: SHIPPED | OUTPATIENT
Start: 2021-05-07 | End: 2021-05-27

## 2021-05-07 RX ORDER — CLONAZEPAM 1 MG/1
1 TABLET ORAL 2 TIMES DAILY PRN
Qty: 60 TABLET | Refills: 0 | Status: SHIPPED | OUTPATIENT
Start: 2021-05-07 | End: 2021-06-08 | Stop reason: SDUPTHER

## 2021-05-07 ASSESSMENT — ENCOUNTER SYMPTOMS
VOMITING: 1
DIARRHEA: 1
BACK PAIN: 0
ABDOMINAL PAIN: 1
CONSTIPATION: 0
TROUBLE SWALLOWING: 0
EYES NEGATIVE: 1
NAUSEA: 0

## 2021-05-07 NOTE — PROGRESS NOTES
Kadie Daigle (:  1985) is a 28 y.o. male,Established patient, here for evaluation of the following chief complaint(s):  Follow-up (for medication)      ASSESSMENT/PLAN:  1. Irritable bowel syndrome with diarrhea  Continues despite bland diet and medication  Will need upper and lower scope    -     Rosalia Hart MD, Gastroenterology, [place]  2. Gastroesophageal reflux disease without esophagitis  Will add tagament  Till gets upper GI  [place] diet    -     pantoprazole (PROTONIX) 40 MG tablet; Take 1 tablet by mouth every morning (before breakfast), Disp-30 tablet, R-5Normal  -     cimetidine (TAGAMET) 400 MG tablet; Take 1 tablet by mouth 2 times daily, Disp-60 tablet, R-5Normal  -     ondansetron (ZOFRAN-ODT) 4 MG disintegrating tablet; Take 1 tablet by mouth 3 times daily as needed for Nausea or Vomiting, Disp-21 tablet, R-0Normal  -     Rosalia Hart MD, Gastroenterology, [place]  3. Bipolar 1 disorder ([facility]  On lamcital   seroquel  He has weaned of haldol with side effects : monitor for need to return    -     clonazePAM (KLONOPIN) 1 MG tablet; Take 1 tablet by mouth 2 times daily as needed for Anxiety for up to 30 days. , Disp-60 tablet, R-0Normal  4. Persistent mood (affective) disorder, unspecified (HCC)  Cont at present  -     clonazePAM (KLONOPIN) 1 MG tablet; Take 1 tablet by mouth 2 times daily as needed for Anxiety for up to 30 days. , Disp-60 tablet, R-0Normal  5. Paranoid behavior ([facility]  -     clonazePAM (KLONOPIN) 1 MG tablet; Take 1 tablet by mouth 2 times daily as needed for Anxiety for up to 30 days. , Disp-60 tablet, R-0Normal  6. Atypical stereotyped movement disorder  -     clonazePAM (KLONOPIN) 1 MG tablet; Take 1 tablet by mouth 2 times daily as needed for Anxiety for up to 30 days. , Disp-60 tablet, R-0Normal      Return in about 2 months (around 2021) for KATHERINE mood swings GI upper and lower GERD.     SUBJECTIVE/OBJECTIVE:  HPI    Patient is here for 1month follow up     Has been working hard at [employer]  Has been since august and doing well at present  And kept a job since     He been taking haldol mood control  But took himself off it a month ago  Reports that he is doing ok without it  He reports at present he is sleeping  Reports that he feels that he doesn't need all the meds    He has kept his job  Been working      Anxiety  He has been taking klonipin twice a day  It is helpful to work and function  But he has to have to to function at present  3/8 *60  He still has the same job this month  [employer] working well        GERD    protonix reports was working   He has been taking empty stomach  45 minutes will throw up still          Insomnia  Taking the Seroquel taking a whole at bedtime  Reports that is sleeping around 8- 10 hours  Well rested  On seroquel 300mg nightly      TYLER was reviewed today per office protocol. Report shows No discrepancies. Fill pattern is consistent from single provider(s) at single pharmacy(s). Controlled Substance Monitoring:    Acute and Chronic Pain Monitoring:   RX Monitoring 5/7/2021   Attestation The Prescription Monitoring Report for this patient was reviewed today. Periodic Controlled Substance Monitoring Possible medication side effects, risk of tolerance/dependence & alternative treatments discussed. ;No signs of potential drug abuse or diversion identified.;Obtaining appropriate analgesic effect of treatment. Chronic Pain > 50 MEDD -     Patient reports diarrhea 6 times a day  And continues with gerd despite protonix on empty stomach   But continues  He does vape  But he throws up random and frequent bathroom       Review of Systems   Constitutional: Negative for activity change, appetite change, fatigue and fever. HENT: Negative for congestion, postnasal drip and trouble swallowing. Eyes: Negative.     Gastrointestinal: Positive for abdominal pain (off and on), diarrhea and vomiting (off and on). Negative for constipation and nausea (with gerd). Musculoskeletal: Negative for arthralgias and back pain. Skin: Negative for rash. Neurological:        Shaking improved on medication   Psychiatric/Behavioral: Negative for agitation (stable), decreased concentration, self-injury, sleep disturbance (improved on seroquel nightly) and suicidal ideas. The patient is not nervous/anxious. Paranoid and antisocial behavior improved         Physical Exam  Vitals signs reviewed. Constitutional:       General: He is not in acute distress. Appearance: Normal appearance. He is not ill-appearing. HENT:      Head: Normocephalic. Right Ear: Tympanic membrane normal.      Left Ear: Tympanic membrane normal.   Cardiovascular:      Rate and Rhythm: Normal rate and regular rhythm. Heart sounds: No murmur. Pulmonary:      Effort: Pulmonary effort is normal.      Breath sounds: Normal breath sounds. Abdominal:      General: Bowel sounds are normal.      Tenderness: There is abdominal tenderness (with random throwing up despite bland diet). There is no right CVA tenderness, left CVA tenderness or rebound. Musculoskeletal:         General: No tenderness. Skin:     Findings: No rash. Neurological:      Mental Status: He is alert and oriented to person, place, and time. Psychiatric:         Mood and Affect: Mood normal.         Behavior: Behavior normal.      Comments: Improving at present                   An electronic signature was used to authenticate this note.     --Sherlyn Blackman, RADHA

## 2021-05-07 NOTE — PATIENT INSTRUCTIONS
Patient Education        Bipolar Disorder: Care Instructions  Your Care Instructions     Bipolar disorder is an illness that causes extreme mood changes, from times of very high energy (manic episodes) to times of depression. But many people with bipolar disorder show only the symptoms of depression. These moods may cause problems with your work, school, family life, friendships, and how well you function. This disease is also called manic-depression. There is no cure for bipolar disorder, but it can be helped with medicines. Counseling may also help. It is important to take your medicines exactly as prescribed, even when you feel well. You may need lifelong treatment. Follow-up care is a key part of your treatment and safety. Be sure to make and go to all appointments, and call your doctor if you are having problems. It's also a good idea to know your test results and keep a list of the medicines you take. How can you care for yourself at home? · Be safe with medicines. Take your medicines exactly as prescribed. Do not stop or change a medicine without talking to your doctor first. Thea Scott and your doctor may need to try different combinations of medicines to find what works for you. · Take your medicines on schedule to keep your moods even. When you feel good, you may think that you do not need your medicines. But it is important to keep taking them. · Go to your counseling sessions. Call and talk with your counselor if you can't go to a session or if you don't think the sessions are helping. Do not just stop going. · Get at least 30 minutes of activity on most days of the week. Walking is a good choice. You also may want to do other things, such as running, swimming, or cycling. · Get enough sleep. Keep your room dark and quiet. Try to go to bed at the same time every night. · Eat a healthy diet. This includes whole grains, dairy, fruits, vegetables, and protein. Eat foods from each of these groups.   · This can also cause pain and swelling in your esophagus. (This is called esophagitis.)  If you have mild GERD symptoms including heartburn, you may be able to control the problem with antacids or over-the-counter medicine. You can also make lifestyle changes to help reduce your symptoms. These include changing your diet and eating habits, such as not eating late at night and losing weight. Follow-up care is a key part of your treatment and safety. Be sure to make and go to all appointments, and call your doctor if you are having problems. It's also a good idea to know your test results and keep a list of the medicines you take. How can you care for yourself at home? · Take your medicines exactly as prescribed. Call your doctor if you think you are having a problem with your medicine. · Your doctor may recommend over-the-counter medicine. For mild or occasional indigestion, antacids, such as Tums, Gaviscon, Mylanta, or Maalox, may help. Your doctor also may recommend over-the-counter acid reducers, such as famotidine (Pepcid AC), cimetidine (Tagamet HB), or omeprazole (Prilosec). Read and follow all instructions on the label. If you use these medicines often, talk with your doctor. · Change your eating habits. ? It's best to eat several small meals instead of two or three large meals. ? After you eat, wait 2 to 3 hours before you lie down. ? Chocolate, mint, and alcohol can make GERD worse. ? Spicy foods, foods that have a lot of acid (like tomatoes and oranges), and coffee can make GERD symptoms worse in some people. If your symptoms are worse after you eat a certain food, you may want to stop eating that food to see if your symptoms get better. · Do not smoke or chew tobacco. Smoking can make GERD worse. If you need help quitting, talk to your doctor about stop-smoking programs and medicines. These can increase your chances of quitting for good.   · If you have GERD symptoms at night, raise the head of your bed 6 to 8 inches by putting the frame on blocks or placing a foam wedge under the head of your mattress. (Adding extra pillows does not work.)  · Do not wear tight clothing around your middle. · Lose weight if you need to. Losing just 5 to 10 pounds can help. When should you call for help? Call your doctor now or seek immediate medical care if:    · You have new or different belly pain.     · Your stools are black and tarlike or have streaks of blood. Watch closely for changes in your health, and be sure to contact your doctor if:    · Your symptoms have not improved after 2 days.     · Food seems to catch in your throat or chest.   Where can you learn more? Go to https://Act[employer] Experience[employer]. org and sign in to your CloudSplit account. Enter N488 in the [employer] box to learn more about \"Gastroesophageal Reflux Disease (GERD): Care Instructions. \"     If you do not have an account, please click on the \"Sign Up Now\" link. Current as of: April 15, 2020               Content Version: 12.8  © 8951-4531 Healthwise, Incorporated. Care instructions adapted under license by [facility] ([facility]). If you have questions about a medical condition or this instruction, always ask your healthcare professional. [address]  any warranty or liability for your use of this information.

## 2021-05-27 ENCOUNTER — OFFICE VISIT (OUTPATIENT)
Dept: GASTROENTEROLOGY | Age: 36
End: 2021-05-27
Payer: MEDICAID

## 2021-05-27 VITALS
HEIGHT: 68 IN | DIASTOLIC BLOOD PRESSURE: 80 MMHG | OXYGEN SATURATION: 99 % | HEART RATE: 70 BPM | WEIGHT: 185 LBS | BODY MASS INDEX: 28.04 KG/M2 | SYSTOLIC BLOOD PRESSURE: 120 MMHG

## 2021-05-27 DIAGNOSIS — R10.13 EPIGASTRIC PAIN: ICD-10-CM

## 2021-05-27 DIAGNOSIS — R10.9 ABDOMINAL CRAMPING: ICD-10-CM

## 2021-05-27 DIAGNOSIS — K62.5 BRBPR (BRIGHT RED BLOOD PER RECTUM): ICD-10-CM

## 2021-05-27 DIAGNOSIS — R19.5 LOOSE STOOLS: ICD-10-CM

## 2021-05-27 DIAGNOSIS — K21.9 GASTROESOPHAGEAL REFLUX DISEASE, UNSPECIFIED WHETHER ESOPHAGITIS PRESENT: Primary | ICD-10-CM

## 2021-05-27 PROCEDURE — 99204 OFFICE O/P NEW MOD 45 MIN: CPT | Performed by: NURSE PRACTITIONER

## 2021-05-27 RX ORDER — OMEPRAZOLE 40 MG/1
40 CAPSULE, DELAYED RELEASE ORAL DAILY
Qty: 30 CAPSULE | Refills: 11 | Status: SHIPPED | OUTPATIENT
Start: 2021-05-27 | End: 2021-09-09 | Stop reason: SDUPTHER

## 2021-05-27 ASSESSMENT — ENCOUNTER SYMPTOMS
ANAL BLEEDING: 1
NAUSEA: 1
CHOKING: 0
DIARRHEA: 1
ABDOMINAL PAIN: 1
COUGH: 0
BLOOD IN STOOL: 0
VOMITING: 0
CONSTIPATION: 0
TROUBLE SWALLOWING: 0
SHORTNESS OF BREATH: 0
RECTAL PAIN: 0
ABDOMINAL DISTENTION: 0

## 2021-05-27 NOTE — PATIENT INSTRUCTIONS
specific directions regarding restrictions to diet and bowel prep instructions including laxatives. Please read these instructions one week prior to your scheduled procedure to ensure that you are prepared. If you have any questions regarding these instructions please call our office Mon through Fri from 8:00 am to 4:00 pm.     Follow prep instructions provided for bowel prep. Take all of the bowel prep as directed. If you are having problems with nausea, stop your prep for 30-45 min to allow the nausea to subside before resuming your prep. It is important to drink plenty of fluids throughout the day before taking your laxatives. This will help to protect your kidneys, prevent dehydration and maximize the effect of the bowel prep. Your diet before a colonoscopy bowel preparation is very important to ensure a successful colon exam. It is recommended to consider certain changes to your diet three to four days prior to the procedure. Remember that your bowels need to be completely empty for the exam.    What foods are good to eat? Cut down on heavy solid foods three to four days before the procedure and start introducing lighter meals to your diet. The following food suggestions are a good part of your diet before a colonoscopy bowel preparation.  Light meat that is easily digestible such as chicken (without the skin)    Potatoes without skin    Cheese    Eggs    A light meal of steamed white fish    Light clear soups    Foods and drinks to avoid  Avoid foods that contain too much fiber. Stay clear of dark colored beverages. They can stick to the walls of the digestive tract and make it difficult to differentiate from blood.  Some of these foods are:   Red meat, rice, nuts and vegetables    Milk, other milk based fluids and cream    Most fruit and puddings    Whole grain pasta    Cereals, bran and seeds    Colored beverages, especially those that are red or purple in color    Red colored Jell-O   On the day before the colonoscopy, continue to drink plenty of clear fluids. It is important   to keep yourself hydrated before the exam.     Please follow all instructions as provided for cleansing the bowel. Failure to have an adequately prepped colon may cause you to have incomplete exam with further testing required. http://salinas.org/      Increasing Your Fiber Intake   What is fiber? Fiber is the portion of plant foods that cannot be digested. There are two kinds of fiber, both of which are keys to a healthy diet and a healthy digestive system:   - Soluble fiber aids in bulking and moving food through the gut. It forms a gel when mixed with liquid. - Insoluble fiber does not mix with liquids and passes through the GI tract mostly intact. It is sometimes called \"roughage. \"     Why do I need to eat it? Fiber has many important roles:   - Helps maintain regular bowel movements. More fiber can improve both diarrhea and constipation.   - Reduces the risk of developing hemorrhoids. - Lowers LDL or \"bad\" cholesterol levels, which lowers risk of heart disease.   - Regulates blood sugar levels in people with diabetes. - Provides a feeling of fullness and may help with weight loss. How much do I need? The Academy of Nutrition and Dietetics recommends:   - For women, 25 grams per day under age 48 and 21 grams per day over age 48. - For men, 38 grams per day under age 48 and 30 grams per day over age 48. What foods are the best sources? Plant foods contain fiber, but some more than others. Best choices are:   - Whole grains and high fiber cereals. - Dried beans and legumes. - Fruits and vegetables, especially raw. What about fiber supplements?    If you need to add more fiber than you can get in your diet, consider:   - Type of Fiber: The major brands of fiber supplements (Metamucil®, Konsyl®, Citrucel®, Benefiber®, Fibercon®) all use soluble fiber and work in the same way. - Flavorings and Mixing: Many of the powdered brands have added flavoring and are mixed with just water. Other varieties are \"clear\" and can be added to numerous beverages and food items. Some brands also offer a \"wafer\" form. It's up to you! Tip: Increasing the fiber in your diet gradually may help minimize bloating and discomfort. Be sure to drink plenty of fluids as you increase your fiber intake.         Fruits  Serving size  Total fiber (grams)    Pear  1 medium  5.1    Figs, dried  2 medium  3.7    Blueberries  1 cup  3.5    Apple, with skin  1 medium  4.4    Strawberries  1 cup  3.3    Peaches, dried  3 halves  3.2    Orange  1 medium  3.1    Apricots, dried  10 halves  2.6    Raisins  1.5-ounce box  1.6    Grains, cereal & pasta  Serving size  Total fiber (grams)    Spaghetti, whole-wheat  1 cup  6.3    Bran flakes  3/4 cup  5.1    Oatmeal  1 cup  4.0    Bread, rye  1 slice  1.9    Bread, whole-wheat  1 slice  1.9    Bread, mixed-grain  1 slice  1.7    Bread, cracked-wheat  1 slice  1.4

## 2021-05-27 NOTE — PROGRESS NOTES
changes, medication changes if necessary, and need for bowel prep (if ordered) prior to their Endoscopic procedure. They are aware they must have someone accompany them to their scheduled procedure to drive them home - they agree to the above and are willing to continue. Orders  No orders of the defined types were placed in this encounter. Medications  Orders Placed This Encounter   Medications    omeprazole (PRILOSEC) 40 MG delayed release capsule     Sig: Take 1 capsule by mouth daily Take first thing daily on an empty stomach. Dispense:  30 capsule     Refill:  11         Patient History:     Past Medical History:   Diagnosis Date    Depression        History reviewed. No pertinent surgical history. Family History   Family history unknown: Yes       Social History     Socioeconomic History    Marital status: Single     Spouse name: None    Number of children: None    Years of education: None    Highest education level: None   Occupational History    None   Tobacco Use    Smoking status: Former Smoker     Quit date: 5/27/2018     Years since quitting: 3.0    Smokeless tobacco: Never Used   Vaping Use    Vaping Use: Former    Quit date: 5/20/2021    Substances: Always   Substance and Sexual Activity    Alcohol use: Not Currently    Drug use: Yes     Types: Marijuana     Comment: once a wk    Sexual activity: None   Other Topics Concern    None   Social History Narrative    None     Social Determinants of Health     Financial Resource Strain:     Difficulty of Paying Living Expenses:    Food Insecurity:     Worried About Running Out of Food in the Last Year:     Ran Out of Food in the Last Year:    Transportation Needs:     Lack of Transportation (Medical):      Lack of Transportation (Non-Medical):    Physical Activity:     Days of Exercise per Week:     Minutes of Exercise per Session:    Stress:     Feeling of Stress :    Social Connections:     Frequency of Communication with Friends and Family:     Frequency of Social Gatherings with Friends and Family:     Attends Adventism Services:     Active Member of Clubs or Organizations:     Attends Club or Organization Meetings:     Marital Status:    Intimate Partner Violence:     Fear of Current or Ex-Partner:     Emotionally Abused:     Physically Abused:     Sexually Abused:        Current Outpatient Medications   Medication Sig Dispense Refill    omeprazole (PRILOSEC) 40 MG delayed release capsule Take 1 capsule by mouth daily Take first thing daily on an empty stomach. 30 capsule 11    clonazePAM (KLONOPIN) 1 MG tablet Take 1 tablet by mouth 2 times daily as needed for Anxiety for up to 30 days. 60 tablet 0    ondansetron (ZOFRAN-ODT) 4 MG disintegrating tablet Take 1 tablet by mouth 3 times daily as needed for Nausea or Vomiting 21 tablet 0    ibuprofen (ADVIL;MOTRIN) 800 MG tablet Take 1 tablet by mouth 3 times daily (with meals) 90 tablet 5    QUEtiapine (SEROQUEL) 300 MG tablet Take 1 tablet by mouth nightly 30 tablet 5    lamoTRIgine (LAMICTAL) 100 MG tablet Take 2 tablets by mouth 2 times daily 60 tablet 5    sildenafil (VIAGRA) 100 MG tablet Take 1 tablet by mouth daily as needed for Erectile Dysfunction 30 tablet 2     No current facility-administered medications for this visit. Allergies   Allergen Reactions    Tetracyclines & Related        Review of Systems   Constitutional: Negative for activity change, appetite change, fatigue, fever and unexpected weight change. HENT: Negative for trouble swallowing. Respiratory: Negative for cough, choking and shortness of breath. Cardiovascular: Negative for chest pain. Gastrointestinal: Positive for abdominal pain, anal bleeding, diarrhea and nausea. Negative for abdominal distention, blood in stool, constipation, rectal pain and vomiting. Allergic/Immunologic: Negative for food allergies. All other systems reviewed and are negative. Objective:     /80   Pulse 70   Ht 5' 8\" (1.727 m)   Wt 185 lb (83.9 kg)   SpO2 99%   BMI 28.13 kg/m²     Physical Exam  Vitals reviewed. Constitutional:       General: He is not in acute distress. Appearance: He is well-developed. HENT:      Head: Normocephalic and atraumatic. Right Ear: External ear normal.      Left Ear: External ear normal.      Nose: Nose normal.      Comments: Mask on     Mouth/Throat:      Comments: Mask on  Eyes:      Conjunctiva/sclera: Conjunctivae normal.      Pupils: Pupils are equal, round, and reactive to light. Cardiovascular:      Rate and Rhythm: Normal rate and regular rhythm. Heart sounds: Normal heart sounds. No murmur heard. No friction rub. No gallop. Pulmonary:      Effort: Pulmonary effort is normal. No respiratory distress. Breath sounds: Normal breath sounds. Abdominal:      General: Bowel sounds are normal. There is no distension. Palpations: Abdomen is soft. There is no mass. Tenderness: There is no abdominal tenderness. There is no guarding or rebound. Musculoskeletal:         General: Normal range of motion. Cervical back: Normal range of motion and neck supple. Skin:     General: Skin is warm and dry. Findings: No rash. Nails: There is no clubbing. Neurological:      Mental Status: He is alert and oriented to person, place, and time. Gait: Gait normal.   Psychiatric:         Behavior: Behavior normal.         Thought Content:  Thought content normal.

## 2021-06-02 ENCOUNTER — ANESTHESIA (OUTPATIENT)
Dept: OPERATING ROOM | Age: 36
End: 2021-06-02

## 2021-06-02 ENCOUNTER — HOSPITAL ENCOUNTER (OUTPATIENT)
Age: 36
Setting detail: OUTPATIENT SURGERY
Discharge: HOME | End: 2021-06-02
Attending: INTERNAL MEDICINE | Admitting: INTERNAL MEDICINE
Payer: MEDICAID

## 2021-06-02 ENCOUNTER — HOSPITAL ENCOUNTER (OUTPATIENT)
Age: 36
Setting detail: SPECIMEN
Discharge: HOME | End: 2021-06-02
Payer: MEDICAID

## 2021-06-02 ENCOUNTER — ANESTHESIA EVENT (OUTPATIENT)
Dept: OPERATING ROOM | Age: 36
End: 2021-06-02

## 2021-06-02 ENCOUNTER — APPOINTMENT (OUTPATIENT)
Dept: OPERATING ROOM | Age: 36
End: 2021-06-02

## 2021-06-02 VITALS
OXYGEN SATURATION: 100 % | BODY MASS INDEX: 28.95 KG/M2 | DIASTOLIC BLOOD PRESSURE: 62 MMHG | TEMPERATURE: 99.3 F | RESPIRATION RATE: 16 BRPM | SYSTOLIC BLOOD PRESSURE: 91 MMHG | HEIGHT: 68 IN | HEART RATE: 49 BPM | WEIGHT: 191 LBS

## 2021-06-02 VITALS — OXYGEN SATURATION: 99 % | DIASTOLIC BLOOD PRESSURE: 57 MMHG | SYSTOLIC BLOOD PRESSURE: 90 MMHG

## 2021-06-02 PROCEDURE — 45380 COLONOSCOPY AND BIOPSY: CPT | Performed by: INTERNAL MEDICINE

## 2021-06-02 PROCEDURE — 88342 IMHCHEM/IMCYTCHM 1ST ANTB: CPT

## 2021-06-02 PROCEDURE — 43239 EGD BIOPSY SINGLE/MULTIPLE: CPT | Performed by: INTERNAL MEDICINE

## 2021-06-02 PROCEDURE — 88305 TISSUE EXAM BY PATHOLOGIST: CPT

## 2021-06-02 PROCEDURE — 43239 EGD BIOPSY SINGLE/MULTIPLE: CPT

## 2021-06-02 RX ORDER — PROPOFOL 10 MG/ML
INJECTION, EMULSION INTRAVENOUS PRN
Status: DISCONTINUED | OUTPATIENT
Start: 2021-06-02 | End: 2021-06-02 | Stop reason: SDUPTHER

## 2021-06-02 RX ORDER — SODIUM CHLORIDE 9 MG/ML
INJECTION, SOLUTION INTRAVENOUS CONTINUOUS
Status: DISCONTINUED | OUTPATIENT
Start: 2021-06-02 | End: 2021-06-02 | Stop reason: HOSPADM

## 2021-06-02 RX ORDER — LIDOCAINE HYDROCHLORIDE 10 MG/ML
INJECTION, SOLUTION INFILTRATION; PERINEURAL PRN
Status: DISCONTINUED | OUTPATIENT
Start: 2021-06-02 | End: 2021-06-02 | Stop reason: SDUPTHER

## 2021-06-02 RX ADMIN — PROPOFOL 300 MG: 10 INJECTION, EMULSION INTRAVENOUS at 12:05

## 2021-06-02 RX ADMIN — SODIUM CHLORIDE: 9 INJECTION, SOLUTION INTRAVENOUS at 11:33

## 2021-06-02 RX ADMIN — LIDOCAINE HYDROCHLORIDE 40 MG: 10 INJECTION, SOLUTION INFILTRATION; PERINEURAL at 12:05

## 2021-06-02 ASSESSMENT — PAIN - FUNCTIONAL ASSESSMENT: PAIN_FUNCTIONAL_ASSESSMENT: 0-10

## 2021-06-02 NOTE — OP NOTE
Endoscopic Procedure Note    Patient: Tera Rinne : 1985  Med Rec#: [id number] Acc#: [id number]     Primary Care Provider RADHA Castellanos    Endoscopist: Ania Tapia MD, MD    Date of Procedure:  2021    Procedure:   1. EGD with cold biopsies    Indications: For both EGD and colonoscopy exams today:  1. Gastroesophageal reflux disease, unspecified whether esophagitis present    2. Abdominal cramping    3. Loose stools    4. BRBPR (bright red blood per rectum)    5. Epigastric pain      Anesthesia:  Sedation was administered by anesthesia who monitored the patient during the procedure. Estimated Blood Loss: minimal    Procedure:   After reviewing the patient's chart and obtaining informed consent, the patient was placed in the left lateral decubitus position. A forward-viewing Olympus endoscope was lubricated and inserted through the mouth into the oropharynx. Under direct visualization, the upper esophagus was intubated. The scope was advanced to the level of the third portion of duodenum. Scope was slowly withdrawn with careful inspection of the mucosal surfaces. The scope was retroflexed for inspection of the gastric fundus and incisura. Findings and maneuvers are listed in impression below. The patient tolerated the procedure well. The scope was removed. There were no immediate complications. Findings/IMPRESSION:  Esophagus: normal normal EG junction at 40 cm. Random cold biopsies were taken to check for nonerosive reflux disease. There is no obvious hiatal hernia present. Stomach:  abnormal: Patchy mucosal changes with small areas of erythema and few small 1 to 2 mm erosions in the distal antrum suggestive of mild gastritis noted -  Gastric biopsies were taken from the antrum  to rule out Helicobacter pylori infection versus chemical gastritis. NO ulcers or masses or gastric outlet obstruction or retained food or fluid.  Rugae were normal and lumen

## 2021-06-02 NOTE — H&P
Patient Name: Va Rosales  : 1985  MRN: [id number]  DATE: 21    Allergies: Allergies   Allergen Reactions    Tetracyclines & Related         ENDOSCOPY  History and Physical    Procedure:    [x] Diagnostic Colonoscopy       [] Screening Colonoscopy  [x] EGD      [] ERCP      [] EUS       [] Other    [x] Previous office notes/History and Physical reviewed from the patients chart. Please see EMR for further details of HPI. I have examined the patient's status immediately prior to the procedure and:      Indications/HPI: For both EGD and colonoscopy exams today:  1. Gastroesophageal reflux disease, unspecified whether esophagitis present    2. Abdominal cramping    3. Loose stools    4. BRBPR (bright red blood per rectum)    5. Epigastric pain        []Abdominal Pain   []Cancer- GI/Lung     []Fhx of colon CA/polyps  []History of Polyps  []Barretts            []Melena  []Abnormal Imaging              []Dysphagia              []Persistent Pneumonia   []Anemia                            []Food Impaction        []History of Polyps  [] GI Bleed             []Pulmonary nodule/Mass   []Change in bowel habits []Heartburn/Reflux  []Rectal Bleed (BRBPR)  []Chest Pain - Non Cardiac []Heme (+) Stool []Ulcers  []Constipation  []Hemoptysis  []Varices  []Diarrhea  []Hypoxemia    []Nausea/Vomiting   []Screening   []Crohns/Colitis  []Other:     Anesthesia:   [x] MAC [] Moderate Sedation   [] General   [] None     ROS: 12 pt Review of Symptoms was negative unless mentioned above    Medications:   Prior to Admission medications    Medication Sig Start Date End Date Taking? Authorizing Provider   clonazePAM (KLONOPIN) 1 MG tablet Take 1 tablet by mouth 2 times daily as needed for Anxiety for up to 30 days.  21 Yes Jeanne Marking, APRN   ondansetron (ZOFRAN-ODT) 4 MG disintegrating tablet Take 1 tablet by mouth 3 times daily as needed for Nausea or Vomiting 21  Yes Jeanne Marking, APRN ibuprofen (ADVIL;MOTRIN) 800 MG tablet Take 1 tablet by mouth 3 times daily (with meals) 4/8/21  Yes Jeanne BealRADHA   QUEtiapine (SEROQUEL) 300 MG tablet Take 1 tablet by mouth nightly 1/12/21  Yes Caitlin SorensenRADHA   lamoTRIgine (LAMICTAL) 100 MG tablet Take 2 tablets by mouth 2 times daily 12/14/20  Yes Caitlin DriversRADHA   omeprazole (PRILOSEC) 40 MG delayed release capsule Take 1 capsule by mouth daily Take first thing daily on an empty stomach. 5/27/21   RADHA Patterson - NP   sildenafil (VIAGRA) 100 MG tablet Take 1 tablet by mouth daily as needed for Erectile Dysfunction 8/11/20   RADHA Mccray       Past Medical History:  Past Medical History:   Diagnosis Date    Arthritis     Depression        Past Surgical History:  History reviewed. No pertinent surgical history. Social History:  Social History     Tobacco Use    Smoking status: Former Smoker     Quit date: 5/27/2018     Years since quitting: 3.0    Smokeless tobacco: Never Used   Vaping Use    Vaping Use: Former    Quit date: 5/20/2021    Substances: Always   Substance Use Topics    Alcohol use: Not Currently    Drug use: Yes     Types: Marijuana     Comment: once a wk       Vital Signs:   Vitals:    06/02/21 1114   BP: 118/71   Pulse: 72   Resp: 18   Temp: 99.3 °F (37.4 °C)   SpO2: 98%        Physical Exam:  Cardiac:  [x]WNL  []Comments:  Pulmonary:  [x]WNL   []Comments:  Neuro/Mental Status:  [x]WNL  []Comments:  Abdominal:  [x]WNL    []Comments:  Other:   []WNL  []Comments:    Informed Consent:  The risks and benefits of the procedure have been discussed with either the patient or if they cannot consent, their representative. Assessment:  Patient examined and appropriate for planned sedation and procedure. Plan:  Proceed with planned sedation and procedure as above.          Gopi Grider MD

## 2021-06-02 NOTE — ANESTHESIA POSTPROCEDURE EVALUATION
Department of Anesthesiology  Postprocedure Note    Patient: Kit Linder  MRN: [id number]  YOB: 1985  Date of evaluation: 6/2/2021  Time:  12:08 PM     Procedure Summary     Date: 06/02/21 Room / Location: [facility] ENDO 02 / [address] [place] [address]    Anesthesia Start: 1200 Anesthesia Stop:     Procedures:       EGD BIOPSY (N/A Esophagus)      COLONOSCOPY DIAGNOSTIC (N/A Abdomen) Diagnosis: (WORSENING REFLUX, EPIG PAIN, ABD PAIN, DIARRHEA, BRBPR)    Surgeons: Vero Mcmanus MD Responsible Provider: RADHA Ordoñez CRNA    Anesthesia Type: general, TIVA ASA Status: 2          Anesthesia Type: No value filed. Kvng Phase I:      Kvng Phase II:      Last vitals: Reviewed and per EMR flowsheets.        Anesthesia Post Evaluation    Patient location during evaluation: bedside  Patient participation: complete - patient participated  Level of consciousness: sleepy but conscious  Pain score: 0  Airway patency: patent  Nausea & Vomiting: no nausea and no vomiting  Complications: no  Cardiovascular status: blood pressure returned to baseline  Respiratory status: acceptable, room air and spontaneous ventilation  Hydration status: euvolemic

## 2021-06-02 NOTE — OP NOTE
Patient: Tr Manning : 1985  Med Rec#: [id number] Acc#: [id number]   Primary Care Provider RADHA Jaeger    Date of Procedure:  2021    Endoscopist: Riri Smith MD, MD    Referring Provider: RADHA Jaeger,     Operation Performed: Colonoscopy to the terminal ileum with random cold biopsies of colon    Indications: For both EGD and colonoscopy exams today:  1. Gastroesophageal reflux disease, unspecified whether esophagitis present    2. Abdominal cramping    3. Loose stools    4. BRBPR (bright red blood per rectum)    5. Epigastric pain      Anesthesia:  Sedation was administered by anesthesia who monitored the patient during the procedure. I met with Tr Manning prior to procedure. We discussed the procedure itself, and I have discussed the risks of endoscopy (including-- but not limited to-- pain, discomfort, bleeding potentially requiring second endoscopic procedure and/or blood transfusion, organ perforation requiring operative repair, damage to organs near the colon, infection, aspiration, cardiopulmonary/allergic reaction), benefits, indications to endoscopy. Additionally, we discussed options other than colonoscopy. The patient expressed understanding. All questions answered. The patient decided to proceed with the procedure. Signed informed consent was placed on the chart. Blood Loss: minimal    Withdrawal time: More than 6 minutes  Bowel Prep: adequate and good    Complications: no immediate complications    DESCRIPTION OF PROCEDURE:     A time out was performed. After written informed consent was obtained, the patient was placed in the left lateral position. The perianal area was inspected, and a digital rectal exam was performed. A rectal exam was performed: normal tone, no palpable lesions. At this point, a forward viewing Olympus colonoscope was inserted into the anus and carefully advanced to the middle ileum.   The cecum was identified by the ileocecal valve and the appendiceal orifice. The colonoscope was then slowly withdrawn with careful inspection of the mucosa in a linear and circumferential fashion. The scope was retroflexed in the rectum. Suction was utilized during the procedure to remove as much air as possible from the bowel. The colonoscope was removed from the patient, and the procedure was terminated. Findings are listed below. Findings: The mucosa appeared normal throughout the entire examined colon and the examined terminal ileum. Random biopsies were taken to check for microscopic colitis. NO large polyps or masses or strictures or colitis or evidence of overt IBD. Internal hemorrhoids-Grade 1 without bleeding stigmata but likely source of his recent bright red blood per rectum. Where it was clearly visible, the mucosa appeared normal throughout the entire examined colon  Retroflexion in the rectum was otherwise normal and revealed no further abnormalities     Patient likely has IBS as a potential etiology of many of his GI symptoms. Recommendations:  1. Repeat colonoscopy: pending pathology -if biopsies are negative for microscopic colitis, in 10 years for colorectal cancer screening. 2. Await biopsy results-you will receive a letter with your results within 7-10 days    - Resume previous meds and diet  - GI clinic f/u 4-6 weeks with Ms. Sherryle Remak scheduled f/u appts with other MDs     - NO ASA/NSAIDs x 2 weeks    Findings and recommendations were discussed w/ the patient. A copy of the images was provided.     Jenni Carvalho MD, MD  6/2/2021  12:05 PM

## 2021-06-07 DIAGNOSIS — F34.9 PERSISTENT MOOD (AFFECTIVE) DISORDER, UNSPECIFIED (HCC): ICD-10-CM

## 2021-06-07 DIAGNOSIS — F98.4 ATYPICAL STEREOTYPED MOVEMENT DISORDER: ICD-10-CM

## 2021-06-07 DIAGNOSIS — F31.9 BIPOLAR 1 DISORDER (HCC): ICD-10-CM

## 2021-06-07 DIAGNOSIS — F22 PARANOID BEHAVIOR (HCC): ICD-10-CM

## 2021-06-08 RX ORDER — CLONAZEPAM 1 MG/1
1 TABLET ORAL 2 TIMES DAILY PRN
Qty: 60 TABLET | Refills: 0 | Status: SHIPPED | OUTPATIENT
Start: 2021-06-08 | End: 2021-07-08 | Stop reason: SDUPTHER

## 2021-07-08 ENCOUNTER — OFFICE VISIT (OUTPATIENT)
Dept: PRIMARY CARE CLINIC | Age: 36
End: 2021-07-08
Payer: MEDICAID

## 2021-07-08 VITALS
TEMPERATURE: 98 F | WEIGHT: 184.25 LBS | DIASTOLIC BLOOD PRESSURE: 80 MMHG | HEART RATE: 78 BPM | SYSTOLIC BLOOD PRESSURE: 110 MMHG | BODY MASS INDEX: 28.02 KG/M2 | OXYGEN SATURATION: 95 %

## 2021-07-08 DIAGNOSIS — K21.9 GASTROESOPHAGEAL REFLUX DISEASE WITHOUT ESOPHAGITIS: Primary | ICD-10-CM

## 2021-07-08 DIAGNOSIS — K58.0 IRRITABLE BOWEL SYNDROME WITH DIARRHEA: ICD-10-CM

## 2021-07-08 DIAGNOSIS — F22 PARANOID BEHAVIOR (HCC): ICD-10-CM

## 2021-07-08 DIAGNOSIS — F34.9 PERSISTENT MOOD (AFFECTIVE) DISORDER, UNSPECIFIED (HCC): ICD-10-CM

## 2021-07-08 DIAGNOSIS — F98.4 ATYPICAL STEREOTYPED MOVEMENT DISORDER: ICD-10-CM

## 2021-07-08 DIAGNOSIS — F31.9 BIPOLAR 1 DISORDER (HCC): ICD-10-CM

## 2021-07-08 PROCEDURE — 99214 OFFICE O/P EST MOD 30 MIN: CPT | Performed by: NURSE PRACTITIONER

## 2021-07-08 RX ORDER — LAMOTRIGINE 200 MG/1
200 TABLET ORAL 2 TIMES DAILY
Qty: 60 TABLET | Refills: 11 | Status: SHIPPED | OUTPATIENT
Start: 2021-07-08 | End: 2021-09-09 | Stop reason: SDUPTHER

## 2021-07-08 RX ORDER — CLONAZEPAM 1 MG/1
1 TABLET ORAL 2 TIMES DAILY PRN
Qty: 60 TABLET | Refills: 0 | Status: SHIPPED | OUTPATIENT
Start: 2021-07-08 | End: 2021-08-09 | Stop reason: SDUPTHER

## 2021-07-08 ASSESSMENT — ENCOUNTER SYMPTOMS
TROUBLE SWALLOWING: 0
EYES NEGATIVE: 1
NAUSEA: 0
BACK PAIN: 0
DIARRHEA: 0
CONSTIPATION: 0
ABDOMINAL PAIN: 1
VOMITING: 1

## 2021-07-08 NOTE — PATIENT INSTRUCTIONS
people. If your symptoms are worse after you eat a certain food, you may want to stop eating that food to see if your symptoms get better. · Do not smoke or chew tobacco. Smoking can make GERD worse. If you need help quitting, talk to your doctor about stop-smoking programs and medicines. These can increase your chances of quitting for good. · If you have GERD symptoms at night, raise the head of your bed 6 to 8 inches by putting the frame on blocks or placing a foam wedge under the head of your mattress. (Adding extra pillows does not work.)  · Do not wear tight clothing around your middle. · Lose weight if you need to. Losing just 5 to 10 pounds can help. When should you call for help? Call your doctor now or seek immediate medical care if:    · You have new or different belly pain.     · Your stools are black and tarlike or have streaks of blood. Watch closely for changes in your health, and be sure to contact your doctor if:    · Your symptoms have not improved after 2 days.     · Food seems to catch in your throat or chest.   Where can you learn more? Go to [email or web address]://[email or web address].Twitch. org and sign in to your [email or web address] account. Enter W135 in the [facility] box to learn more about \"Gastroesophageal Reflux Disease (GERD): Care Instructions. \"     If you do not have an account, please click on the \"Sign Up Now\" link. Current as of: February 10, 2021               Content Version: 12.9  © 1903-4495 [employer], [facility]. Care instructions adapted under license by [facility] ([facility]). If you have questions about a medical condition or this instruction, always ask your healthcare professional. [place] [address] any warranty or liability for your use of this information.

## 2021-07-08 NOTE — PROGRESS NOTES
Deysi Kurtz (:  1985) is a 28 y.o. male,Established patient, here for evaluation of the following chief complaint(s):  Follow-up (for KATHERINE and mood)         ASSESSMENT/PLAN:  1. Gastroesophageal reflux disease without esophagitis  Will do bland  Cont medication   Doing well  -     External Referral To Gastroenterology  2. Bipolar 1 disorder ([facility]  Will cont stable  Will call for next month refill  And follow up in 2 months  Due to issues with boss    -     clonazePAM (KLONOPIN) 1 MG tablet; Take 1 tablet by mouth 2 times daily as needed for Anxiety for up to 30 days. , Disp-60 tablet, R-0Normal  -     lamoTRIgine (LAMICTAL) 200 MG tablet; Take 1 tablet by mouth 2 times daily, Disp-60 tablet, R-11Normal  3. Persistent mood (affective) disorder, unspecified (HCC)  -     clonazePAM (KLONOPIN) 1 MG tablet; Take 1 tablet by mouth 2 times daily as needed for Anxiety for up to 30 days. , Disp-60 tablet, R-0Normal  -     lamoTRIgine (LAMICTAL) 200 MG tablet; Take 1 tablet by mouth 2 times daily, Disp-60 tablet, R-11Normal  4. Paranoid behavior ([facility]  Stable at present    -     clonazePAM (KLONOPIN) 1 MG tablet; Take 1 tablet by mouth 2 times daily as needed for Anxiety for up to 30 days. , Disp-60 tablet, R-0Normal  -     lamoTRIgine (LAMICTAL) 200 MG tablet; Take 1 tablet by mouth 2 times daily, Disp-60 tablet, R-11Normal  5. Atypical stereotyped movement disorder  -     clonazePAM (KLONOPIN) 1 MG tablet; Take 1 tablet by mouth 2 times daily as needed for Anxiety for up to 30 days. , Disp-60 tablet, R-0Normal  -     lamoTRIgine (LAMICTAL) 200 MG tablet; Take 1 tablet by mouth 2 times daily, Disp-60 tablet, R-11Normal  6. Irritable bowel syndrome with diarrhea  Stable at present  Will see Kojo Herbert  -     External Referral To Gastroenterology      Return in about 2 months (around 2021) for 2 month follow up KATHERINE gerd with leon.          Subjective   SUBJECTIVE/OBJECTIVE:  HPI  Patient is here for 1month follow up     Has been working hard at [employer]  Has been since august and doing well at present  And kept a job since     He been taking haldol mood control  But took himself off it a month ago  Reports that he is doing ok without it  He reports at present he is sleeping  Reports that he feels that he doesn't need all the meds     He has kept his job  Been working  And stable overal        Anxiety  He has been taking klonipin twice a day  It is helpful to work and function  But he has to have to to function at present   *60 6/7   He still has the same job this month  [employer] working well        GERD    protonix reports was working   He has been taking empty stomach  45 minutes will throw up still  Had EGD noted to have GERD and IBS   But no medication change  And was to have follow up yesterday but he didn't know about follow up             Insomnia  Taking the Seroquel taking a whole at bedtime  Reports that is sleeping around 8- 10 hours  Well rested  On seroquel 300mg nightly  He is able to get up and work        TYLER was reviewed today per office protocol. Report shows No discrepancies. Fill pattern is consistent from single provider(s) at single pharmacy(s).     Controlled Substance Monitoring:    Acute and Chronic Pain Monitoring:   RX Monitoring 7/8/2021   Attestation The Prescription Monitoring Report for this patient was reviewed today. Periodic Controlled Substance Monitoring Possible medication side effects, risk of tolerance/dependence & alternative treatments discussed. ;No signs of potential drug abuse or diversion identified.;Obtaining appropriate analgesic effect of treatment. Chronic Pain > 50 MEDD -         Review of Systems   Constitutional: Negative for activity change, appetite change, fatigue and fever. HENT: Negative for congestion, postnasal drip and trouble swallowing. Eyes: Negative.     Gastrointestinal: Positive for abdominal pain (off and on

## 2021-08-09 DIAGNOSIS — F22 PARANOID BEHAVIOR (HCC): ICD-10-CM

## 2021-08-09 DIAGNOSIS — F98.4 ATYPICAL STEREOTYPED MOVEMENT DISORDER: ICD-10-CM

## 2021-08-09 DIAGNOSIS — F34.9 PERSISTENT MOOD (AFFECTIVE) DISORDER, UNSPECIFIED (HCC): ICD-10-CM

## 2021-08-09 DIAGNOSIS — F31.9 BIPOLAR 1 DISORDER (HCC): ICD-10-CM

## 2021-08-09 RX ORDER — CLONAZEPAM 1 MG/1
1 TABLET ORAL 2 TIMES DAILY PRN
Qty: 60 TABLET | Refills: 0 | Status: SHIPPED | OUTPATIENT
Start: 2021-08-09 | End: 2021-09-09

## 2021-09-09 ENCOUNTER — OFFICE VISIT (OUTPATIENT)
Dept: PRIMARY CARE CLINIC | Age: 36
End: 2021-09-09
Payer: MEDICAID

## 2021-09-09 VITALS
BODY MASS INDEX: 28.43 KG/M2 | DIASTOLIC BLOOD PRESSURE: 88 MMHG | WEIGHT: 187 LBS | OXYGEN SATURATION: 95 % | TEMPERATURE: 97.8 F | SYSTOLIC BLOOD PRESSURE: 132 MMHG | HEART RATE: 104 BPM

## 2021-09-09 DIAGNOSIS — F98.4 ATYPICAL STEREOTYPED MOVEMENT DISORDER: ICD-10-CM

## 2021-09-09 DIAGNOSIS — F34.9 PERSISTENT MOOD (AFFECTIVE) DISORDER, UNSPECIFIED (HCC): ICD-10-CM

## 2021-09-09 DIAGNOSIS — F22 PARANOID BEHAVIOR (HCC): ICD-10-CM

## 2021-09-09 DIAGNOSIS — F31.9 BIPOLAR 1 DISORDER (HCC): ICD-10-CM

## 2021-09-09 PROCEDURE — 99213 OFFICE O/P EST LOW 20 MIN: CPT | Performed by: NURSE PRACTITIONER

## 2021-09-09 RX ORDER — CLONAZEPAM 1 MG/1
1 TABLET ORAL 3 TIMES DAILY
Qty: 90 TABLET | Refills: 0 | Status: SHIPPED | OUTPATIENT
Start: 2021-09-09 | End: 2021-10-14 | Stop reason: SDUPTHER

## 2021-09-09 RX ORDER — OMEPRAZOLE 40 MG/1
40 CAPSULE, DELAYED RELEASE ORAL DAILY
Qty: 30 CAPSULE | Refills: 11 | Status: SHIPPED | OUTPATIENT
Start: 2021-09-09 | End: 2021-12-15 | Stop reason: SDUPTHER

## 2021-09-09 RX ORDER — LAMOTRIGINE 200 MG/1
200 TABLET ORAL 2 TIMES DAILY
Qty: 60 TABLET | Refills: 11 | Status: SHIPPED | OUTPATIENT
Start: 2021-09-09 | End: 2021-12-15 | Stop reason: SDUPTHER

## 2021-09-09 ASSESSMENT — ENCOUNTER SYMPTOMS
CONSTIPATION: 0
EYES NEGATIVE: 1
VOMITING: 1
DIARRHEA: 0
NAUSEA: 0
BACK PAIN: 0
ABDOMINAL PAIN: 1
TROUBLE SWALLOWING: 0

## 2021-09-09 NOTE — PROGRESS NOTES
Nancy Monson (:  1985) is a 28 y.o. male,Established patient, here for evaluation of the following chief complaint(s):  Follow-up      ASSESSMENT/PLAN:    ICD-10-CM    1. Bipolar 1 disorder (HCC)  F31.9 clonazePAM (KLONOPIN) 1 MG tablet  Cont present  On lamictal   Monitor for changes     2. Persistent mood (affective) disorder, unspecified (HCC)  F34.9 clonazePAM (KLONOPIN) 1 MG tablet  Will refill when do by phone call     3. Paranoid behavior (HCC)  F22 clonazePAM (KLONOPIN) 1 MG tablet  Improving  Monitor with stress     4. Atypical stereotyped movement disorder  F98.4 clonazePAM (KLONOPIN) 1 MG tablet       Return in about 2 months (around 2021) for KATHERINE follow up . SUBJECTIVE/OBJECTIVE:  HPI    Patient is here for follow up    Reports that he has started college and is feeling \"overwhelmed\"  Reports that he has been pretty anxious working   He reports that is is trying to keep up with the others  It is his first semester and so working on it    Anxiety  Taking klonipin twice a day  Helps to function  Last filled *60  Taking and doing stable overall  But is stressed with school doing work till -   And cant get it together  The [address] and computers is new      Controlled Substance Monitoring:    Acute and Chronic Pain Monitoring:   RX Monitoring 2021   Attestation The Prescription Monitoring Report for this patient was reviewed today. Periodic Controlled Substance Monitoring Possible medication side effects, risk of tolerance/dependence & alternative treatments discussed. ;No signs of potential drug abuse or diversion identified.;Obtaining appropriate analgesic effect of treatment. Chronic Pain > 50 MEDD Considered consultation with a specialist.     Liz Acevedo was reviewed today per office protocol. Report shows No discrepancies. Fill pattern is consistent from single provider(s) at single pharmacy(s).     Reports sleeping  Around 5-6 hours  He just feels anxious now with school  Different issues    GERD  Saw dr Rolando Burgess  Had scope and working on diet  But relief  On prilosec 40mg daily  No more issues        /88 (Site: Right Upper Arm, Position: Sitting, Cuff Size: Large Adult)   Pulse 104   Temp 97.8 °F (36.6 °C) (Temporal)   Wt 187 lb (84.8 kg)   SpO2 95%   BMI 28.43 kg/m²   Review of Systems   Constitutional: Negative for activity change, appetite change, fatigue and fever. HENT: Negative for congestion, postnasal drip and trouble swallowing. Eyes: Negative. Gastrointestinal: Positive for abdominal pain (off and on continues) and vomiting (off and on). Negative for constipation, diarrhea and nausea (with gerd continues). Musculoskeletal: Negative for arthralgias and back pain. Skin: Negative for rash. Neurological:        Shaking improved on medication   Psychiatric/Behavioral: Negative for agitation (stable), decreased concentration, self-injury, sleep disturbance (improved on seroquel nightly) and suicidal ideas. The patient is not nervous/anxious. Paranoid and antisocial behavior improved         Physical Exam  Vitals reviewed. Constitutional:       General: He is not in acute distress. Appearance: Normal appearance. He is not ill-appearing. HENT:      Head: Normocephalic. Right Ear: Tympanic membrane normal.      Left Ear: Tympanic membrane normal.   Cardiovascular:      Rate and Rhythm: Normal rate and regular rhythm. Heart sounds: No murmur heard. Pulmonary:      Effort: Pulmonary effort is normal.      Breath sounds: Normal breath sounds. Abdominal:      General: Bowel sounds are normal.      Tenderness: There is abdominal tenderness (with random throwing up despite bland diet). There is no right CVA tenderness, left CVA tenderness or rebound. Musculoskeletal:         General: No tenderness. Skin:     Findings: No rash. Neurological:      Mental Status: He is alert and oriented to person, place, and time. Psychiatric:         Mood and Affect: Mood normal.         Behavior: Behavior normal.      Comments: Improving at present                     An electronic signature was used to authenticate this note.     --Gricel Francisco, APRN

## 2021-09-09 NOTE — TELEPHONE ENCOUNTER
Patient is swapping pharmacies, he needs this medication refill sent to [facility] in [place]. I have attached refill. Requested Prescriptions     Pending Prescriptions Disp Refills    omeprazole (PRILOSEC) 40 MG delayed release capsule 30 capsule 11     Sig: Take 1 capsule by mouth daily Take first thing daily on an empty stomach.

## 2021-09-09 NOTE — PATIENT INSTRUCTIONS

## 2021-10-06 ENCOUNTER — TELEPHONE (OUTPATIENT)
Dept: PRIMARY CARE CLINIC | Age: 36
End: 2021-10-06

## 2021-10-06 NOTE — TELEPHONE ENCOUNTER
----- Message from Silverio Juarez sent at 10/6/2021  1:30 PM CDT -----  Subject: Refill Request    QUESTIONS  Name of Medication? clonazePAM (KLONOPIN) 1 MG tablet  Patient-reported dosage and instructions? 1MG taken 3x daily  How many days do you have left? 3  Preferred Pharmacy? [id number] [facility] phone number (if available)? [phone]  ---------------------------------------------------------------------------  --------------,  Name of Medication? omeprazole (PRILOSEC) 40 MG delayed release capsule  Patient-reported dosage and instructions? 40MG 1 in morning with an empty   stomach  How many days do you have left? 8  Preferred Pharmacy? [id number] [facility] phone number (if available)? [phone]  ---------------------------------------------------------------------------  --------------,  Name of Medication? QUEtiapine (SEROQUEL) 300 MG tablet  Patient-reported dosage and instructions? 300MG taken 1x daily at night  How many days do you have left? 16  Preferred Pharmacy? [id number] [facility] phone number (if available)? [phone]  Additional Information for Provider? Pt does not need a call back if not   needed about an update on the Rx refills. Pt will still check for a missed   call from the office in case.  ---------------------------------------------------------------------------  --------------  [address]  What is the best way for the office to contact you?  Do not leave any   message, patient will call back for answer, OK to respond with electronic   message via [employer] portal (only for patients who have registered [employer]   account)  Preferred Call Back Phone Number? [phone]

## 2021-10-13 DIAGNOSIS — F31.9 BIPOLAR 1 DISORDER (HCC): ICD-10-CM

## 2021-10-13 DIAGNOSIS — F34.9 PERSISTENT MOOD (AFFECTIVE) DISORDER, UNSPECIFIED (HCC): ICD-10-CM

## 2021-10-13 RX ORDER — QUETIAPINE FUMARATE 300 MG/1
300 TABLET, FILM COATED ORAL NIGHTLY
Qty: 30 TABLET | Refills: 11 | Status: SHIPPED | OUTPATIENT
Start: 2021-10-13

## 2021-10-13 NOTE — TELEPHONE ENCOUNTER
Pt called and was upset that his meds had not been sent in and it had been a week. I looked and asked what was supposed to be sent in bc the last refill request I saw was 9/12/21. He said his klonopin. I advised him that since Klonopin was a controlled substance, he would need to make an appt. That he was sent a Jaman message on this last week. He said that you always call it in for him and his appt is not until next month. I explained that I would have to ask you and you were out of the office today, but that was not our normal protocol. He said well what am I supposed to do about my medicine? I explained that I would have to send you a message and ask if you would fill it or not. That you were out today (your usual day off) and that I would send you a message to ask if you wanted to fill it or he would need to be seen. He said so if she wont fill it, I have to wait until next month to get my medication? I explained again, no. Alessandra Dick is out today. It is our protocol to be seen each month/or for each refill of controlled substances. It has been that way for the almost 16 years that I have worked with [employer]. that no other provider will send it in for him. So he would have to wait for tomorrow and if he does need an appt, we would make him one for the first available. He would not have to wait until his appt next month. He said that he just didn't understand you call in his meds every month. And he doesn't see [employer], he sees you that it shouldn't matter what [employer] does. 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 He wanted you to just call him tomorrow and tell him what he needed to do. I told him that you wouldn't, that you would be seeing pts and that one of the clinical staff would call him with what he needed to do. He said yet again, so what I am I supposed to do about my meds? Just go without them.  bc she always calls them in. I advised him that I was only going to explain this one more time. 1. You are out today-Wed is your normal day off. 2. I would send you a message and ask if he had a special case that would warrant calling in his klonopin. 3. If he did have a special case and you would refill it-we would call and let him know that it was being sent in. 4 if he does need to follow our normal procotol, he would also be called and an appt would be made for your next available to be seen in office or virtually to get his med refilled. He then asked if he needed to call us in the morning to see what he needed to do. I explained that he did not need to do anything. We would call him in the morning and let him know what he needed to do and go from there. Somewhere in all of that, he said he needed his seroquel and I told him that was not a problem, I could send that in for him.

## 2021-10-14 DIAGNOSIS — F98.4 ATYPICAL STEREOTYPED MOVEMENT DISORDER: ICD-10-CM

## 2021-10-14 DIAGNOSIS — F34.9 PERSISTENT MOOD (AFFECTIVE) DISORDER, UNSPECIFIED (HCC): ICD-10-CM

## 2021-10-14 DIAGNOSIS — F31.9 BIPOLAR 1 DISORDER (HCC): ICD-10-CM

## 2021-10-14 DIAGNOSIS — F22 PARANOID BEHAVIOR (HCC): ICD-10-CM

## 2021-10-14 RX ORDER — CLONAZEPAM 1 MG/1
1 TABLET ORAL 3 TIMES DAILY
Qty: 90 TABLET | Refills: 0 | Status: SHIPPED | OUTPATIENT
Start: 2021-10-14 | End: 2021-11-09 | Stop reason: SDUPTHER

## 2021-10-14 NOTE — TELEPHONE ENCOUNTER
Patient is aware medication is sent in, and there is a note n his chart that states that he comes every other month for his appt and Candy refills his Klonopin every month.

## 2021-11-09 ENCOUNTER — OFFICE VISIT (OUTPATIENT)
Dept: PRIMARY CARE CLINIC | Age: 36
End: 2021-11-09
Payer: MEDICAID

## 2021-11-09 VITALS
HEART RATE: 83 BPM | DIASTOLIC BLOOD PRESSURE: 68 MMHG | SYSTOLIC BLOOD PRESSURE: 110 MMHG | OXYGEN SATURATION: 98 % | BODY MASS INDEX: 29.84 KG/M2 | WEIGHT: 196.25 LBS | TEMPERATURE: 96.8 F

## 2021-11-09 DIAGNOSIS — F22 PARANOID BEHAVIOR (HCC): ICD-10-CM

## 2021-11-09 DIAGNOSIS — F98.4 ATYPICAL STEREOTYPED MOVEMENT DISORDER: ICD-10-CM

## 2021-11-09 DIAGNOSIS — F31.9 BIPOLAR 1 DISORDER (HCC): ICD-10-CM

## 2021-11-09 DIAGNOSIS — F34.9 PERSISTENT MOOD (AFFECTIVE) DISORDER, UNSPECIFIED (HCC): ICD-10-CM

## 2021-11-09 PROCEDURE — 99214 OFFICE O/P EST MOD 30 MIN: CPT | Performed by: NURSE PRACTITIONER

## 2021-11-09 RX ORDER — CLONAZEPAM 1 MG/1
1 TABLET ORAL 3 TIMES DAILY
Qty: 90 TABLET | Refills: 0 | Status: SHIPPED | OUTPATIENT
Start: 2021-11-09 | End: 2021-12-09 | Stop reason: SDUPTHER

## 2021-11-09 ASSESSMENT — ENCOUNTER SYMPTOMS
CONSTIPATION: 0
NAUSEA: 0
DIARRHEA: 0
BACK PAIN: 0
TROUBLE SWALLOWING: 0
ABDOMINAL PAIN: 0
EYES NEGATIVE: 1
VOMITING: 0

## 2021-11-09 NOTE — PATIENT INSTRUCTIONS
Patient Education        Bipolar Disorder: Care Instructions  Your Care Instructions     Bipolar disorder is an illness that causes extreme mood changes, from times of very high energy (manic episodes) to times of depression. But many people with bipolar disorder show only the symptoms of depression. These moods may cause problems with your work, school, family life, friendships, and how well you function. This disease is also called manic-depression. There is no cure for bipolar disorder, but it can be helped with medicines. Counseling may also help. It is important to take your medicines exactly as prescribed, even when you feel well. You may need lifelong treatment. Follow-up care is a key part of your treatment and safety. Be sure to make and go to all appointments, and call your doctor if you are having problems. It's also a good idea to know your test results and keep a list of the medicines you take. How can you care for yourself at home? · Be safe with medicines. Take your medicines exactly as prescribed. Do not stop or change a medicine without talking to your doctor first. [facility] and your doctor may need to try different combinations of medicines to find what works for you. · Take your medicines on schedule to keep your moods even. When you feel good, you may think that you do not need your medicines. But it is important to keep taking them. · Go to your counseling sessions. Call and talk with your counselor if you can't go to a session or if you don't think the sessions are helping. Do not just stop going. · Get at least 30 minutes of activity on most days of the week. Walking is a good choice. You also may want to do other things, such as running, swimming, or cycling. · Get enough sleep. Keep your room dark and quiet. Try to go to bed at the same time every night. · Eat a healthy diet. This includes whole grains, dairy, fruits, vegetables, and protein.  Eat foods from each of these groups. · Try to lower your stress. Manage your time, build a strong support system, and lead a healthy lifestyle. To lower your stress, try physical activity, slow deep breathing, or getting a massage. · Do not use alcohol, marijuana, or illegal drugs. · Learn the early signs of your mood changes. You can then take steps to help yourself feel better. · Ask for help from friends and family when you need it. You may need help with daily chores when you are depressed. When you are manic, you may need support to control your high energy levels. What should you do if someone in your family has bipolar disorder? · Learn about the disease and signs it's getting worse. · Remind your family member you love them. · Make a plan with all family members about how to take care of your loved one when symptoms are bad. · Remind yourself it will take time for changes to occur. · Try not to blame yourself for the disease. · Know your legal rights and the legal rights of your family member. Support groups or counselors can help with this information. · Take care of yourself. Keep up with your interests, such as career, hobbies, and friends. Use exercise, positive self-talk, deep breathing, and other relaxing exercises to help lower your stress. · Give yourself time to grieve. You may need to deal with emotions such as anger, fear, and frustration. · If you are having a hard time with your feelings or with your relationship with your family member, talk with a counselor. When should you call for help? Call 911 anytime you think you may need emergency care. For example, call if:    · You feel like hurting yourself or someone else.     · Someone who has bipolar disorder displays dangerous behavior, and you think the person might hurt himself or herself or someone else. Call your doctor now or seek immediate medical care if:    · You hear voices.     · Someone you know has bipolar disorder and talks about suicide.  Keep the numbers for these national suicide hotlines: 4-522-362-TALK (5-359.901.3527) and 8-252-VBYEWDV (9-645.137.7811). If a suicide threat seems real, with a specific plan and a way to carry it out, stay with the person, or ask someone you trust to stay with the person, until you can get help.     · Someone you know has bipolar disorder and:  ? Starts to give away possessions. ? Is using illegal drugs or drinking alcohol heavily. ? Talks or writes about death, including writing suicide notes or talking about guns, knives, or pills. ? Talks or writes about hurting someone else. ? Starts to spend a lot of time alone. ? Acts very aggressively or suddenly appears calm. ? Talks about beliefs that are not based in reality (delusions). Watch closely for changes in your health, and be sure to contact your doctor if:    · You cannot go to your counseling sessions. Where can you learn more? Go to https://Raise5.Fingooroo. org and sign in to your [employer] account. Enter K052 in the [facility] box to learn more about \"Bipolar Disorder: Care Instructions. \"     If you do not have an account, please click on the \"Sign Up Now\" link. Current as of: June 16, 2021               Content Version: 13.0  © 1159-1372 [employer]. Care instructions adapted under license by [facility] [place] ([facility]). If you have questions about a medical condition or this instruction, always ask your healthcare professional. [place] [address] any warranty or liability for your use of this information.

## 2021-11-09 NOTE — PROGRESS NOTES
Nedra Ayers (:  1985) is a 28 y.o. male,Established patient, here for evaluation of the following chief complaint(s):  Follow-up (for KATHERINE)      ASSESSMENT/PLAN:    ICD-10-CM    1. Bipolar 1 disorder (HCC)  F31.9 clonazePAM (KLONOPIN) 1 MG tablet  Cont present  Monitor for changes     2. Persistent mood (affective) disorder, unspecified (HCC)  F34.9 clonazePAM (KLONOPIN) 1 MG tablet   3. Paranoid behavior (HCC)  F22 clonazePAM (KLONOPIN) 1 MG tablet   4. Atypical stereotyped movement disorder  F98.4 clonazePAM (KLONOPIN) 1 MG tablet  Cont present         Return in about 1 month (around 2021) for medication follow up UDS. SUBJECTIVE/OBJECTIVE:  HPI    Patient is here for anxiety bipolar disorder  Reports that he is going to school   Is almost done with first semester  Reports that he is doing well    Anxiety  Taking klonipin twice a day  Helps to function  Last filled 10/9*60  Taking and doing stable overall  But is stressed with school doing work till 7-8   Almost done with this semester  Plus work    He only comes every 2 months  Due to his boss and issues getting here    Controlled Substance Monitoring:    Acute and Chronic Pain Monitoring:   RX Monitoring 2021   Attestation The Prescription Monitoring Report for this patient was reviewed today. Periodic Controlled Substance Monitoring Possible medication side effects, risk of tolerance/dependence & alternative treatments discussed. ;No signs of potential drug abuse or diversion identified. ;Assessed functional status. Chronic Pain > 50 MEDD Considered consultation with a specialist.       Colon Ro was reviewed today per office protocol. Report shows No discrepancies. Fill pattern is consistent from single provider(s) at single pharmacy(s).     Bipolar disorder  Taking lamitical twice a day  along seroquel at bedtime   Denies ana paula or depression        /68 (Site: Right Upper Arm, Position: Sitting, Cuff Size: Large Adult) Pulse 83   Temp 96.8 °F (36 °C) (Temporal)   Wt 196 lb 4 oz (89 kg)   SpO2 98%   BMI 29.84 kg/m²   Review of Systems   Constitutional: Negative for activity change, appetite change, fatigue and fever. HENT: Negative for congestion, postnasal drip and trouble swallowing. Eyes: Negative. Gastrointestinal: Negative for abdominal pain, constipation, diarrhea, nausea (with gerd continues) and vomiting (improved). Musculoskeletal: Negative for arthralgias and back pain. Skin: Negative for rash. Neurological:        Shaking improved on medication   Psychiatric/Behavioral: Negative for agitation (stable), decreased concentration, self-injury, sleep disturbance (improved on seroquel nightly) and suicidal ideas. The patient is not nervous/anxious. Paranoid and antisocial behavior improved         Physical Exam  Vitals reviewed. Constitutional:       General: He is not in acute distress. Appearance: Normal appearance. He is not ill-appearing. HENT:      Head: Normocephalic. Right Ear: Tympanic membrane normal.      Left Ear: Tympanic membrane normal.   Cardiovascular:      Rate and Rhythm: Normal rate and regular rhythm. Heart sounds: No murmur heard. Pulmonary:      Effort: Pulmonary effort is normal.      Breath sounds: Normal breath sounds. Abdominal:      General: Bowel sounds are normal.      Tenderness: There is abdominal tenderness (with random throwing up despite bland diet). There is no right CVA tenderness, left CVA tenderness or rebound. Musculoskeletal:         General: No tenderness. Skin:     Findings: No rash. Neurological:      Mental Status: He is alert and oriented to person, place, and time. Psychiatric:         Mood and Affect: Mood normal.         Behavior: Behavior normal.      Comments: Improving at present                     An electronic signature was used to authenticate this note.     --RADHA Rouse

## 2021-12-09 ENCOUNTER — OFFICE VISIT (OUTPATIENT)
Dept: PRIMARY CARE CLINIC | Age: 36
End: 2021-12-09
Payer: MEDICAID

## 2021-12-09 VITALS
SYSTOLIC BLOOD PRESSURE: 110 MMHG | DIASTOLIC BLOOD PRESSURE: 68 MMHG | HEART RATE: 78 BPM | BODY MASS INDEX: 30.03 KG/M2 | TEMPERATURE: 97.7 F | OXYGEN SATURATION: 98 % | WEIGHT: 197.5 LBS

## 2021-12-09 DIAGNOSIS — F98.4 ATYPICAL STEREOTYPED MOVEMENT DISORDER: ICD-10-CM

## 2021-12-09 DIAGNOSIS — K21.9 GASTROESOPHAGEAL REFLUX DISEASE WITHOUT ESOPHAGITIS: ICD-10-CM

## 2021-12-09 DIAGNOSIS — F22 PARANOID BEHAVIOR (HCC): ICD-10-CM

## 2021-12-09 DIAGNOSIS — F31.9 BIPOLAR 1 DISORDER (HCC): Primary | ICD-10-CM

## 2021-12-09 DIAGNOSIS — F34.9 PERSISTENT MOOD (AFFECTIVE) DISORDER, UNSPECIFIED (HCC): ICD-10-CM

## 2021-12-09 PROCEDURE — 99213 OFFICE O/P EST LOW 20 MIN: CPT | Performed by: NURSE PRACTITIONER

## 2021-12-09 RX ORDER — PROMETHAZINE HYDROCHLORIDE 12.5 MG/1
12.5 TABLET ORAL 3 TIMES DAILY PRN
Qty: 12 TABLET | Refills: 0 | Status: SHIPPED | OUTPATIENT
Start: 2021-12-09 | End: 2021-12-30 | Stop reason: SDUPTHER

## 2021-12-09 RX ORDER — CLONAZEPAM 1 MG/1
1 TABLET ORAL 3 TIMES DAILY
Qty: 90 TABLET | Refills: 0 | Status: SHIPPED | OUTPATIENT
Start: 2021-12-09 | End: 2022-01-08

## 2021-12-09 ASSESSMENT — ENCOUNTER SYMPTOMS
TROUBLE SWALLOWING: 0
CONSTIPATION: 0
NAUSEA: 0
EYES NEGATIVE: 1
ABDOMINAL PAIN: 0
VOMITING: 0
DIARRHEA: 0
BACK PAIN: 0

## 2021-12-09 NOTE — PATIENT INSTRUCTIONS

## 2021-12-09 NOTE — PROGRESS NOTES
Odette Frank (:  1985) is a 28 y.o. male,Established patient, here for evaluation of the following chief complaint(s):  Follow-up (for medication)      ASSESSMENT/PLAN:    ICD-10-CM    1. Bipolar 1 disorder (HCC)  F31.9 clonazePAM (KLONOPIN) 1 MG tablet  Stable cont present   Doing well overall     2. Persistent mood (affective) disorder, unspecified (HCC)  F34.9 clonazePAM (KLONOPIN) 1 MG tablet   3. Paranoid behavior (HCC)  F22 clonazePAM (KLONOPIN) 1 MG tablet   4. Atypical stereotyped movement disorder  F98.4 clonazePAM (KLONOPIN) 1 MG tablet   5. Gastroesophageal reflux disease without esophagitis  K21.9 promethazine (PHENERGAN) 12.5 MG tablet  Cont present         Return in about 1 month (around 2022) for franky follow up . SUBJECTIVE/OBJECTIVE:  HPI   Patient is here for anxiety and bipolar follow up  Reports doing well  Got 2 bs and 2 as in school  Doing well overall    Reports that he has been dealing with nausea off and on  Reports no heart burn   He feels more of the anxiety with end of semester    Anxiety  Taking klonipin twice a day  Helps to function  Last filled *60  Taking and doing stable overall  But is stressed with school doing work till 7-8   Almost done with this semester  Plus work     He only comes every 2 months  Due to his boss and issues getting here   but not working at present    Controlled Substance Monitoring:    Acute and Chronic Pain Monitoring:   RX Monitoring 2021   Attestation The Prescription Monitoring Report for this patient was reviewed today. Periodic Controlled Substance Monitoring Possible medication side effects, risk of tolerance/dependence & alternative treatments discussed. ;No signs of potential drug abuse or diversion identified. ;Assessed functional status. Chronic Pain > 50 MEDD Considered consultation with a specialist.       Adamaris Luna was reviewed today per office protocol. Report shows No discrepancies.   Fill pattern is consistent from single provider(s) at single pharmacy(s). /68 (Site: Right Upper Arm, Position: Sitting, Cuff Size: Large Adult)   Pulse 78   Temp 97.7 °F (36.5 °C) (Temporal)   Wt 197 lb 8 oz (89.6 kg)   SpO2 98%   BMI 30.03 kg/m²   Review of Systems   Constitutional: Negative for activity change, appetite change, fatigue and fever. HENT: Negative for congestion, postnasal drip and trouble swallowing. Eyes: Negative. Gastrointestinal: Negative for abdominal pain, constipation, diarrhea, nausea (with gerd continues) and vomiting (improved). Musculoskeletal: Negative for arthralgias and back pain. Skin: Negative for rash. Neurological:        Shaking improved on medication   Psychiatric/Behavioral: Negative for agitation (stable), decreased concentration, self-injury, sleep disturbance (improved on seroquel nightly) and suicidal ideas. The patient is not nervous/anxious. Paranoid and antisocial behavior improved         Physical Exam  Vitals reviewed. Constitutional:       General: He is not in acute distress. Appearance: Normal appearance. He is not ill-appearing. HENT:      Head: Normocephalic. Right Ear: Tympanic membrane normal.      Left Ear: Tympanic membrane normal.   Cardiovascular:      Rate and Rhythm: Normal rate and regular rhythm. Heart sounds: No murmur heard. Pulmonary:      Effort: Pulmonary effort is normal.      Breath sounds: Normal breath sounds. Abdominal:      General: Bowel sounds are normal.      Tenderness: There is no abdominal tenderness (resolved). There is no right CVA tenderness, left CVA tenderness or rebound. Comments: Rare nausea   Musculoskeletal:         General: No tenderness. Skin:     Findings: No rash. Neurological:      Mental Status: He is alert and oriented to person, place, and time.    Psychiatric:         Mood and Affect: Mood normal.         Behavior: Behavior normal.      Comments: Improving at present An electronic signature was used to authenticate this note.     --RADHA Sharif

## 2021-12-15 ENCOUNTER — TELEPHONE (OUTPATIENT)
Dept: PRIMARY CARE CLINIC | Age: 36
End: 2021-12-15

## 2021-12-15 DIAGNOSIS — F98.4 ATYPICAL STEREOTYPED MOVEMENT DISORDER: ICD-10-CM

## 2021-12-15 DIAGNOSIS — F22 PARANOID BEHAVIOR (HCC): ICD-10-CM

## 2021-12-15 DIAGNOSIS — F31.9 BIPOLAR 1 DISORDER (HCC): ICD-10-CM

## 2021-12-15 DIAGNOSIS — F34.9 PERSISTENT MOOD (AFFECTIVE) DISORDER, UNSPECIFIED (HCC): ICD-10-CM

## 2021-12-15 RX ORDER — OMEPRAZOLE 40 MG/1
40 CAPSULE, DELAYED RELEASE ORAL DAILY
Qty: 30 CAPSULE | Refills: 11 | Status: SHIPPED | OUTPATIENT
Start: 2021-12-15

## 2021-12-15 RX ORDER — LAMOTRIGINE 200 MG/1
200 TABLET ORAL 2 TIMES DAILY
Qty: 60 TABLET | Refills: 11 | Status: SHIPPED | OUTPATIENT
Start: 2021-12-15

## 2021-12-15 NOTE — TELEPHONE ENCOUNTER
Pt called, he just got out of the hospital last night. He took 3300mg of seroquel. Once he got home his wallet and meds were all missing. His school computer is gone. He has called the police dept. He has no meds and didn't know what to do. We went ahead and sent in his lamictal and prilosec.  Advised him to do a VV with Lauryn Martinez first thing in the am.

## 2021-12-20 ENCOUNTER — VIRTUAL VISIT (OUTPATIENT)
Dept: PRIMARY CARE CLINIC | Age: 36
End: 2021-12-20
Payer: MEDICAID

## 2021-12-20 DIAGNOSIS — F31.9 BIPOLAR 1 DISORDER (HCC): Primary | ICD-10-CM

## 2021-12-20 DIAGNOSIS — T50.904A DRUG OVERDOSE, UNDETERMINED INTENT, INITIAL ENCOUNTER: ICD-10-CM

## 2021-12-20 PROCEDURE — 99213 OFFICE O/P EST LOW 20 MIN: CPT | Performed by: NURSE PRACTITIONER

## 2021-12-20 ASSESSMENT — ENCOUNTER SYMPTOMS
EYES NEGATIVE: 1
ABDOMINAL PAIN: 0
CONSTIPATION: 0
TROUBLE SWALLOWING: 0
DIARRHEA: 0
VOMITING: 0
BACK PAIN: 0
NAUSEA: 0

## 2021-12-20 NOTE — PROGRESS NOTES
Meaghan Gaona (:  1985) is a 39 y.o. male,Established patient, here for evaluation of the following chief complaint(s): Medication Problem (admission at [facility])         ASSESSMENT/PLAN:  1. Bipolar 1 disorder ([address]  Worse at present   Had been taking seroquel at bedtime    2. Drug overdose, undetermined intent, initial encounter  Drug screen positive for non controlled   Discussed with patient    No follow-ups on file. SUBJECTIVE/OBJECTIVE:  HPI    Patient is on telephone   Reports that he had an \"overdose \" on seroquel   Was seen by [facility] at the [facility]  Was let out the next day    He reports he is to impulsive ( I didn't just think)  Reports that all his medication \"every one \"  Reports that all of it was taken  He isn't sure but assumes was the   Reports he put the wallet by the chair   But it is gone     He reports that the hospital didn't bring anything   And kept anything there  He has tried to call the   But still not sure where his things are  He reports not able to sleep  He has perhaps got 3  Hours at night    But that it   Will need [facility]    Review of Systems   Constitutional: Negative for activity change, appetite change, fatigue and fever. HENT: Negative for congestion, postnasal drip and trouble swallowing. Eyes: Negative. Gastrointestinal: Negative for abdominal pain, constipation, diarrhea, nausea (with gerd continues) and vomiting (improved). Musculoskeletal: Negative for arthralgias and back pain. Skin: Negative for rash. Neurological:        Shaking improved on medication   Psychiatric/Behavioral: Positive for agitation (stable). Negative for decreased concentration, self-injury, sleep disturbance (improved on seroquel nightly) and suicidal ideas. The patient is nervous/anxious. Paranoid and antisocial behavior upset          No flowsheet data found. Physical Exam  Vitals reviewed.    Constitutional:       General: He is not in acute distress. Appearance: Normal appearance. He is not ill-appearing. HENT:      Head: Normocephalic. Right Ear: Tympanic membrane normal.      Left Ear: Tympanic membrane normal.   Cardiovascular:      Rate and Rhythm: Normal rate and regular rhythm. Heart sounds: No murmur heard. Pulmonary:      Effort: Pulmonary effort is normal.      Breath sounds: Normal breath sounds. Abdominal:      General: Bowel sounds are normal.      Tenderness: There is no abdominal tenderness (resolved). There is no right CVA tenderness, left CVA tenderness or rebound. Comments: Rare nausea   Musculoskeletal:         General: No tenderness. Skin:     Findings: No rash. Neurological:      Mental Status: He is alert and oriented to person, place, and time. Psychiatric:         Mood and Affect: Mood normal.         Behavior: Behavior normal.      Comments: Improving at present                     Kerri Kang, was evaluated through a synchronous (real-time) audio-video encounter. The patient (or guardian if applicable) is aware that this is a billable service. Verbal consent to proceed has been obtained within the past 12 months. The visit was conducted pursuant to the emergency declaration under the [facility], [address] [place] [address] authority and the [employer] General Act. Patient identification was verified, and a caregiver was present when appropriate. The patient was located in a state where the provider was credentialed to provide care. An electronic signature was used to authenticate this note.     --RADHA Kumar

## 2021-12-30 DIAGNOSIS — K21.9 GASTROESOPHAGEAL REFLUX DISEASE WITHOUT ESOPHAGITIS: ICD-10-CM

## 2021-12-30 RX ORDER — PROMETHAZINE HYDROCHLORIDE 12.5 MG/1
12.5 TABLET ORAL 3 TIMES DAILY PRN
Qty: 12 TABLET | Refills: 0 | Status: SHIPPED | OUTPATIENT
Start: 2021-12-30 | End: 2022-01-06

## 2021-12-30 NOTE — TELEPHONE ENCOUNTER
----- Message from Ana Caller sent at 12/30/2021 10:51 AM CST -----  Subject: Refill Request    QUESTIONS  Name of Medication? promethazine (PHENERGAN) 12.5 MG tablet  Patient-reported dosage and instructions? Take 1 tablet by mouth 3 times   daily as needed for Nausea  How many days do you have left? 0  Preferred Pharmacy? [id number] phone number (if available)? [phone]  ---------------------------------------------------------------------------  --------------  CALL BACK INFO  What is the best way for the office to contact you?  OK to leave message on   voicemail  Preferred Call Back Phone Number? [phone]

## 2023-07-10 NOTE — PLAN OF CARE
Cyclobenzaprine Approved    Filling Pharmacy: [facility]  Additional Information: Pharmacy closed, left detailed message with approval information.]     Patient performing ADL's independently today, showered. Patient ate 100% of meals provided today. Mood is stable and patient cooperative with care.

## 2025-04-04 ENCOUNTER — TELEPHONE (OUTPATIENT)
Dept: PSYCHIATRY | Age: 40
End: 2025-04-04

## 2025-04-04 NOTE — TELEPHONE ENCOUNTER
Called pt to schedule an appt from a referral.    Scheduled with Reji Yoo for 04/17/25 @ 10:30.    Electronically signed by Kiki Henning MA on 4/4/2025 at 3:25 PM

## 2025-04-10 ENCOUNTER — TELEPHONE (OUTPATIENT)
Dept: PSYCHIATRY | Age: 40
End: 2025-04-10

## 2025-04-10 NOTE — TELEPHONE ENCOUNTER
Pt called to double check when his appt was.  MA informed pt that his appt was 04/17/25 @ 10:30.  MA offered the pt an earlier appt for 04/11/25 but the times would not work for him.  Pt is keeping his appt for 04/17/25.    Electronically signed by Kiki Henning MA on 4/10/2025 at 2:29 PM

## 2025-04-16 ENCOUNTER — TELEPHONE (OUTPATIENT)
Dept: PSYCHIATRY | Age: 40
End: 2025-04-16

## 2025-04-16 NOTE — TELEPHONE ENCOUNTER
Called patient to remind them of their appointment   Number could not be reached      Reminded patient of their     copay for their appointment. Reminded patient to complete their visit pre-check/digital registration in [employer].    Electronically signed by Alicia D Giraldo-Reyes on 4/16/2025 at 3:40 PM

## (undated) DEVICE — FORCEPS BX 240CM 2.4MM L NDL RAD JAW 4 M00513334

## (undated) DEVICE — ENDO KIT,LOURDES HOSPITAL: Brand: MEDLINE INDUSTRIES, INC.